# Patient Record
Sex: FEMALE | Race: BLACK OR AFRICAN AMERICAN | Employment: FULL TIME | URBAN - METROPOLITAN AREA
[De-identification: names, ages, dates, MRNs, and addresses within clinical notes are randomized per-mention and may not be internally consistent; named-entity substitution may affect disease eponyms.]

---

## 2017-07-10 LAB
ANTIBODY SCREEN, EXTERNAL: NEGATIVE
CHLAMYDIA, EXTERNAL: NEGATIVE
HBSAG, EXTERNAL: NEGATIVE
HIV, EXTERNAL: NEGATIVE
N. GONORRHEA, EXTERNAL: NEGATIVE
RUBELLA, EXTERNAL: NORMAL
TYPE, ABO & RH, EXTERNAL: NORMAL

## 2017-07-19 ENCOUNTER — HOSPITAL ENCOUNTER (EMERGENCY)
Age: 37
Discharge: HOME | End: 2017-07-19
Attending: EMERGENCY MEDICINE | Admitting: EMERGENCY MEDICINE
Payer: MEDICAID

## 2017-07-19 VITALS
OXYGEN SATURATION: 97 % | WEIGHT: 264.99 LBS | RESPIRATION RATE: 20 BRPM | TEMPERATURE: 98.2 F | HEART RATE: 108 BPM | BODY MASS INDEX: 48.76 KG/M2 | DIASTOLIC BLOOD PRESSURE: 64 MMHG | SYSTOLIC BLOOD PRESSURE: 120 MMHG | HEIGHT: 62 IN
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
## 2017-07-19 DIAGNOSIS — S80.01XA CONTUSION OF RIGHT KNEE, INITIAL ENCOUNTER: ICD-10-CM

## 2017-07-19 PROCEDURE — 99284 EMERGENCY DEPT VISIT MOD MDM: CPT

## 2017-07-19 NOTE — PROGRESS NOTES
Spiritual Care Assessment/Progress Notes    Jesse Dc [id number]  [id number]    1980  40 y.o.  female    Patient Telephone Number: [phone] (home)   Holiness Affiliation: Keith Styles   Language: English   Extended Emergency Contact Information  Primary Emergency Contact: [address] Phone: [phone]  Relation: Parent   There are no active problems to display for this patient. Date: 7/19/2017       Level of Holiness/Spiritual Activity:  []         Involved in tavares tradition/spiritual practice    []         Not involved in tavares tradition/spiritual practice  [x]         Spiritually oriented    []         Claims no spiritual orientation    []         seeking spiritual identity  []         Feels alienated from Hinduism practice/tradition  []         Feels angry about Hinduism practice/tradition  []         Spirituality/Hinduism tradition is a resource for coping at this time.   []         Not able to assess due to medical condition    Services Provided Today:  []         crisis intervention    []         reading Scriptures  []         spiritual assessment    []         prayer  []         empathic listening/emotional support  []         rites and rituals (cite in comments)  []         life review     [x]         Hinduism support  []         theological development   []         advocacy  []         ethical dialog     []         blessing  []         bereavement support    []         support to family  []         anticipatory grief support   []         help with AMD  []         spiritual guidance    []         meditation      Spiritual Care Needs  []         Emotional Support  []         Spiritual/Holiness Care  []         Loss/Adjustment  []         Advocacy/Referral                /Ethics  [x]         No needs expressed at               this time  []         Other: (note in               comments)  [address]  []         Follow up visits with pt/family  []         Provide materials  []         Schedule sacraments  []         Contact Community               Clergy  []         Follow up as needed  []         Other: (note in               comments)     Comments: Briefly met with patient in ER. I mistakenly entered this patient's room as I was looking for another patient. Norman Blair shared with me her concerns over her current pregnancy and some of her hopes and dreams. She was preparing for discharge and thankful everything is okay. Assured her of pastoral prayers for her journey. [id number]. Visit by: Anamaria Miranda. Ricky Han.  Karson Shaw MA, [facility]    Lead  [employer] Development [employer] Advancement

## 2017-07-19 NOTE — ED NOTES
Pt is 15 or 16 weeks pregnant. She did not watch her footing and fell forward in the [employer] parking lot just pta. She caught herself with her hands. This is her first pregnancy, and she has not yet felt the baby move.

## 2017-07-19 NOTE — ED PROVIDER NOTES
HPI Comments: Barbara Lara is a 40 y.o. female who presents ambulatory to [address] [facility] with CC of B/L knee pain after GLF PTA today. Pt reports she fell after a misstep while walking off of a curb leaving a fast food restaurant, landing on her hands and knees; she denies hitting her head, and denies LOC. Pt states she has been ambulatory since fall. She states she is currently 15-16 weeks pregnant. She specifically denies any vaginal bleeding, vaginal discharge, ABD pain, fevers, chills, nausea, vomiting, chest pain, shortness of breath, headache, rash, diarrhea, sweating or weight loss. PCP: None    There are no other complaints, changes, or physical findings at this time. The history is provided by the patient. Past Medical History:   Diagnosis Date    Other ill-defined conditions     Intracranial HTN, Inflammation to Eye       History reviewed. No pertinent surgical history. History reviewed. No pertinent family history. Social History     Social History    Marital status: SINGLE     Spouse name: N/A    Number of children: N/A    Years of education: N/A     Occupational History    Not on file. Social History Main Topics    Smoking status: Current Every Day Smoker     Packs/day: 0.25    Smokeless tobacco: Never Used    Alcohol use Yes      Comment: socially    Drug use: No    Sexual activity: No     Other Topics Concern    Not on file     Social History Narrative         ALLERGIES: Review of patient's allergies indicates no known allergies. Review of Systems   Constitutional: Negative. Negative for activity change, appetite change, chills, fatigue, fever and unexpected weight change. HENT: Negative. Negative for congestion, hearing loss, rhinorrhea, sneezing and voice change. Eyes: Negative. Negative for pain and visual disturbance. Respiratory: Negative. Negative for apnea, cough, choking, chest tightness and shortness of breath. Cardiovascular: Negative.   Negative for chest pain and palpitations. Gastrointestinal: Negative. Negative for abdominal distention, abdominal pain, blood in stool, diarrhea, nausea and vomiting. Genitourinary: Negative. Negative for difficulty urinating, flank pain, frequency, urgency, vaginal bleeding and vaginal discharge. No discharge   Musculoskeletal: Positive for arthralgias (B/L knees). Negative for back pain, myalgias and neck stiffness. Skin: Negative. Negative for color change and rash. Neurological: Negative. Negative for dizziness, seizures, syncope, speech difficulty, weakness, numbness and headaches. Hematological: Negative for adenopathy. Psychiatric/Behavioral: Negative. Negative for agitation, behavioral problems, dysphoric mood and suicidal ideas. The patient is not nervous/anxious. Patient Vitals for the past 12 hrs:   Temp Pulse Resp BP SpO2   07/19/17 1315 98.2 °F (36.8 °C) (!) 108 20 120/64 97 %            Physical Exam   Constitutional: She is oriented to person, place, and time. She appears well-developed and well-nourished. No distress. HENT:   Head: Normocephalic and atraumatic. Mouth/Throat: Oropharynx is clear and moist. No oropharyngeal exudate. Eyes: Conjunctivae and EOM are normal. Pupils are equal, round, and reactive to light. Right eye exhibits no discharge. Left eye exhibits no discharge. Neck: Normal range of motion. Neck supple. No spinous process tenderness and no muscular tenderness present. Cardiovascular: Normal rate, regular rhythm and intact distal pulses. Exam reveals no gallop and no friction rub. No murmur heard. Pulses:       Dorsalis pedis pulses are 2+ on the right side, and 2+ on the left side. Posterior tibial pulses are 2+ on the right side, and 2+ on the left side. CR <2 seconds   Pulmonary/Chest: Effort normal and breath sounds normal. No respiratory distress. She has no wheezes. She has no rales. She exhibits no tenderness. Abdominal: Soft. Bowel sounds are normal. She exhibits no distension and no mass. There is no tenderness. There is no rebound and no guarding. Musculoskeletal: Normal range of motion. She exhibits no edema.   - Lachman's sign B/L; full ROM all extremities   Lymphadenopathy:     She has no cervical adenopathy. Neurological: She is alert and oriented to person, place, and time. No cranial nerve deficit. Coordination normal.   Skin: Skin is warm and dry. No rash noted. No erythema. Psychiatric: She has a normal mood and affect. Nursing note and vitals reviewed. MDM  Number of Diagnoses or Management Options  Diagnosis management comments: DDx: contusion, abrasion, fetal injury    ED Course       Procedures    Chief Complaint   Patient presents with    Fall     patient states that she tripped and fell onto asphault and states that she is currently is 15 weeks pregnant. denies any abdominal cramping or vaginal bleeding       1:14 PM  The patients presenting problems have been discussed, and they are in agreement with the care plan formulated and outlined with them. I have encouraged them to ask questions as they arise throughout their visit. VITAL SIGNS:  Patient Vitals for the past 12 hrs:   Temp Pulse Resp BP SpO2   07/19/17 1315 98.2 °F (36.8 °C) (!) 108 20 120/64 97 %       PROCEDURES:    Procedure Note - Screening Ultrasound:  2:06 PM  Performed by: Karis Mccullough. Vania Lopez, [address] [place] [address] performed at [facility]; fetal heart tones present, heart rate 148. The procedure took 1-15 minutes, and pt tolerated well. PROGRESS NOTES:    2:01 PM  Unable to obtain fetal heart tones; will assess with US. DIAGNOSIS:    1. Fall, initial encounter    2. Contusion of right knee, initial encounter        PLAN:  Follow-up Information     Follow up With Details Comments Contact Info    your obgyn Call in 2 days As needed, If symptoms worsen         ED COURSE: The patients hospital course has been uncomplicated.      2:11 PM  Magaly Eulogio's  results have been reviewed with her. She has been counseled regarding her diagnosis. She verbally conveys understanding and agreement of the signs, symptoms, diagnosis, treatment and prognosis and additionally agrees to follow up as recommended with Dr. None in 24 - 48 hours. She also agrees with the care-plan and conveys that all of her questions have been answered. I have also put together some discharge instructions for her that include: 1) educational information regarding their diagnosis, 2) how to care for their diagnosis at home, as well a 3) list of reasons why they would want to return to the ED prior to their follow-up appointment, should their condition change. This note is prepared by Jacoby Judge, acting as Scribe for [employer] Blayne Middleton, [address] Blayne Middleton MD: The scribe's documentation has been prepared under my direction and personally reviewed by me in its entirety. I confirm that the note above accurately reflects all work, treatment, procedures, and medical decision making performed by me.

## 2017-07-19 NOTE — DISCHARGE INSTRUCTIONS
Bruises: Care Instructions  Your Care Instructions    Bruises occur when small blood vessels under the skin tear or rupture, most often from a twist, bump, or fall. Blood leaks into tissues under the skin and causes a black-and-blue spot that often turns colors, including purplish black, reddish blue, or yellowish green, as the bruise heals. Bruises hurt, but most are not serious and will go away on their own within 2 to 4 weeks. Sometimes, gravity causes them to spread down the body. A leg bruise usually will take longer to heal than a bruise on the face or arms. Follow-up care is a key part of your treatment and safety. Be sure to make and go to all appointments, and call your doctor if you are having problems. Its also a good idea to know your test results and keep a list of the medicines you take. How can you care for yourself at home? · Take pain medicines exactly as directed. ¨ If the doctor gave you a prescription medicine for pain, take it as prescribed. ¨ If you are not taking a prescription pain medicine, ask your doctor if you can take an over-the-counter medicine. · Put ice or a cold pack on the area for 10 to 20 minutes at a time. Put a thin cloth between the ice and your skin. · If you can, prop up the bruised area on pillows as much as possible for the next few days. Try to keep the bruise above the level of your heart. When should you call for help? Call your doctor now or seek immediate medical care if:  · You have signs of infection, such as:  ¨ Increased pain, swelling, warmth, or redness. ¨ Red streaks leading from the bruise. ¨ Pus draining from the bruise. ¨ A fever. · You have a bruise on your leg and signs of a blood clot, such as:  ¨ Increasing redness and swelling along with warmth, tenderness, and pain in the bruised area. ¨ Pain in your calf, back of the knee, thigh, or groin. ¨ Redness and swelling in your leg or groin. · Your pain gets worse.   Watch closely for changes in your health, and be sure to contact your doctor if:  · You do not get better as expected. Where can you learn more? Go to http://lin-blair.info/. Enter [phone] [phone] in the search box to learn more about \"Bruises: Care Instructions. \"  Current as of: March 20, 2017  Content Version: 11.3  © 4392-2633 [employer]. Care instructions adapted under license by [employer] (which disclaims liability or warranty for this information). If you have questions about a medical condition or this instruction, always ask your healthcare professional. [place] [address] any warranty or liability for your use of this information. Contusion: Care Instructions  Your Care Instructions  Contusion is the medical term for a bruise. It is the result of a direct blow or an impact, such as a fall. Contusions are common sports injuries. Most people think of a bruise as a black-and-blue spot. This happens when small blood vessels get torn and leak blood under the skin. But bones, muscles, and organs can also get bruised. This may damage deep tissues but not cause a bruise you can see. The doctor will do a physical exam to find the location of your contusion. You may also have tests to make sure you do not have a more serious injury, such as a broken bone or nerve damage. These may include X-rays or other imaging tests like a CT scan or MRI. Deep-tissue contusions may cause pain and swelling. But if there is no serious damage, they will often get better in a few weeks with home treatment. The doctor has checked you carefully, but problems can develop later. If you notice any problems or new symptoms, get medical treatment right away. Follow-up care is a key part of your treatment and safety. Be sure to make and go to all appointments, and call your doctor if you are having problems.  It's also a good idea to know your test results and keep a list of the medicines you take.  How can you care for yourself at home? · Put ice or a cold pack on the sore area for 10 to 20 minutes at a time to stop swelling. Put a thin cloth between the ice pack and your skin. · Be safe with medicines. Read and follow all instructions on the label. ¨ If the doctor gave you a prescription medicine for pain, take it as prescribed. ¨ If you are not taking a prescription pain medicine, ask your doctor if you can take an over-the-counter medicine. · If you can, prop up the sore area on pillows as much as possible for the next few days. Try to keep the sore area above the level of your heart. When should you call for help? Call your doctor now or seek immediate medical care if:  · Your pain gets worse. · You have new or worse swelling. · You have tingling, weakness, or numbness in the area near the contusion. · The area near the contusion is cold or pale. Watch closely for changes in your health, and be sure to contact your doctor if:  · You do not get better as expected. Where can you learn more? Go to Med-Tek.be  Enter [id number] in the search box to learn more about \"Contusion: Care Instructions. \"   © 8432-7774 [employer]. Care instructions adapted under license by [facility] (which disclaims liability or warranty for this information). This care instruction is for use with your licensed healthcare professional. If you have questions about a medical condition or this instruction, always ask your healthcare professional. [place] [address] any warranty or liability for your use of this information.   Content Version: 37.5.476945; Current as of: May 22, 2015

## 2017-07-25 ENCOUNTER — HOSPITAL ENCOUNTER (OUTPATIENT)
Dept: PERINATAL CARE | Age: 37
Discharge: HOME | End: 2017-07-25
Attending: OBSTETRICS & GYNECOLOGY
Payer: MEDICAID

## 2017-07-25 PROCEDURE — 76811 OB US DETAILED SNGL FETUS: CPT | Performed by: OBSTETRICS & GYNECOLOGY

## 2017-09-05 ENCOUNTER — HOSPITAL ENCOUNTER (OUTPATIENT)
Dept: PERINATAL CARE | Age: 37
Discharge: HOME | End: 2017-09-05
Attending: OBSTETRICS & GYNECOLOGY
Payer: MEDICAID

## 2017-09-05 PROCEDURE — 76816 OB US FOLLOW-UP PER FETUS: CPT | Performed by: OBSTETRICS & GYNECOLOGY

## 2017-10-17 ENCOUNTER — HOSPITAL ENCOUNTER (OUTPATIENT)
Dept: PERINATAL CARE | Age: 37
Discharge: HOME | End: 2017-10-17
Payer: MEDICAID

## 2017-10-17 PROCEDURE — 76816 OB US FOLLOW-UP PER FETUS: CPT | Performed by: OBSTETRICS & GYNECOLOGY

## 2017-10-25 ENCOUNTER — HOSPITAL ENCOUNTER (INPATIENT)
Age: 37
LOS: 5 days | Discharge: HOME | DRG: 560 | End: 2017-10-30
Attending: OBSTETRICS & GYNECOLOGY | Admitting: OBSTETRICS & GYNECOLOGY
Payer: MEDICAID

## 2017-10-25 LAB
ERYTHROCYTE [DISTWIDTH] IN BLOOD BY AUTOMATED COUNT: 13.3 % (ref 11.5–14.5)
HCT VFR BLD AUTO: 30.9 % (ref 35–47)
HGB BLD-MCNC: 10.2 G/DL (ref 11.5–16)
MCH RBC QN AUTO: 32.5 PG (ref 26–34)
MCHC RBC AUTO-ENTMCNC: 33 G/DL (ref 30–36.5)
MCV RBC AUTO: 98.4 FL (ref 80–99)
PLATELET # BLD AUTO: 258 K/UL (ref 150–400)
RBC # BLD AUTO: 3.14 M/UL (ref 3.8–5.2)
WBC # BLD AUTO: 12.1 K/UL (ref 3.6–11)

## 2017-10-25 PROCEDURE — 3E0P3VZ INTRODUCTION OF HORMONE INTO FEMALE REPRODUCTIVE, PERCUTANEOUS APPROACH: ICD-10-PCS | Performed by: OBSTETRICS & GYNECOLOGY

## 2017-10-25 PROCEDURE — 74011250636 HC RX REV CODE- 250/636: Performed by: OBSTETRICS & GYNECOLOGY

## 2017-10-25 PROCEDURE — 74011250637 HC RX REV CODE- 250/637: Performed by: OBSTETRICS & GYNECOLOGY

## 2017-10-25 PROCEDURE — 85027 COMPLETE CBC AUTOMATED: CPT | Performed by: OBSTETRICS & GYNECOLOGY

## 2017-10-25 PROCEDURE — 86900 BLOOD TYPING SEROLOGIC ABO: CPT | Performed by: OBSTETRICS & GYNECOLOGY

## 2017-10-25 PROCEDURE — 75410000002 HC LABOR FEE PER 1 HR

## 2017-10-25 PROCEDURE — 74011250636 HC RX REV CODE- 250/636

## 2017-10-25 PROCEDURE — 74011000258 HC RX REV CODE- 258

## 2017-10-25 PROCEDURE — 65270000029 HC RM PRIVATE

## 2017-10-25 PROCEDURE — 36415 COLL VENOUS BLD VENIPUNCTURE: CPT | Performed by: OBSTETRICS & GYNECOLOGY

## 2017-10-25 PROCEDURE — 3E0P7VZ INTRODUCTION OF HORMONE INTO FEMALE REPRODUCTIVE, VIA NATURAL OR ARTIFICIAL OPENING: ICD-10-PCS | Performed by: OBSTETRICS & GYNECOLOGY

## 2017-10-25 RX ORDER — ACETAMINOPHEN 325 MG/1
650 TABLET ORAL
Status: DISCONTINUED | OUTPATIENT
Start: 2017-10-25 | End: 2017-10-30 | Stop reason: HOSPADM

## 2017-10-25 RX ORDER — NALOXONE HYDROCHLORIDE 0.4 MG/ML
0.4 INJECTION, SOLUTION INTRAMUSCULAR; INTRAVENOUS; SUBCUTANEOUS AS NEEDED
Status: DISCONTINUED | OUTPATIENT
Start: 2017-10-25 | End: 2017-10-28 | Stop reason: SDUPTHER

## 2017-10-25 RX ORDER — SODIUM CHLORIDE, SODIUM LACTATE, POTASSIUM CHLORIDE, CALCIUM CHLORIDE 600; 310; 30; 20 MG/100ML; MG/100ML; MG/100ML; MG/100ML
125 INJECTION, SOLUTION INTRAVENOUS CONTINUOUS
Status: DISCONTINUED | OUTPATIENT
Start: 2017-10-25 | End: 2017-10-30 | Stop reason: HOSPADM

## 2017-10-25 RX ORDER — SODIUM CHLORIDE 0.9 % (FLUSH) 0.9 %
5-10 SYRINGE (ML) INJECTION AS NEEDED
Status: DISCONTINUED | OUTPATIENT
Start: 2017-10-25 | End: 2017-10-30 | Stop reason: HOSPADM

## 2017-10-25 RX ORDER — SODIUM CHLORIDE 0.9 % (FLUSH) 0.9 %
5-10 SYRINGE (ML) INJECTION EVERY 8 HOURS
Status: DISCONTINUED | OUTPATIENT
Start: 2017-10-25 | End: 2017-10-30 | Stop reason: HOSPADM

## 2017-10-25 RX ORDER — CEFAZOLIN SODIUM 1 G/3ML
INJECTION, POWDER, FOR SOLUTION INTRAMUSCULAR; INTRAVENOUS
Status: COMPLETED
Start: 2017-10-25 | End: 2017-10-25

## 2017-10-25 RX ORDER — SODIUM CHLORIDE 900 MG/100ML
INJECTION INTRAVENOUS
Status: COMPLETED
Start: 2017-10-25 | End: 2017-10-25

## 2017-10-25 RX ADMIN — CEFAZOLIN SODIUM 1 G: 1 INJECTION, POWDER, FOR SOLUTION INTRAMUSCULAR; INTRAVENOUS at 21:47

## 2017-10-25 RX ADMIN — CEFAZOLIN 3 G: 1 INJECTION, POWDER, FOR SOLUTION INTRAMUSCULAR; INTRAVENOUS; PARENTERAL at 21:53

## 2017-10-25 RX ADMIN — SODIUM CHLORIDE 50 ML: 900 INJECTION, SOLUTION INTRAVENOUS at 21:48

## 2017-10-25 RX ADMIN — SODIUM CHLORIDE, SODIUM LACTATE, POTASSIUM CHLORIDE, AND CALCIUM CHLORIDE 125 ML/HR: 600; 310; 30; 20 INJECTION, SOLUTION INTRAVENOUS at 21:53

## 2017-10-25 RX ADMIN — MISOPROSTOL 100 MCG: 100 TABLET ORAL at 18:52

## 2017-10-25 RX ADMIN — ACETAMINOPHEN 650 MG: 325 TABLET, FILM COATED ORAL at 22:42

## 2017-10-25 NOTE — H&P
History & Physical    Name: Nicolle Joy MRN: [id number]  SSN: [id number]    YOB: 1980  Age: 40 y.o. Sex: female        Subjective:     Estimated Date of Delivery: 17  OB History    Para Term  AB Living   1        SAB TAB Ectopic Molar Multiple Live Births              # Outcome Date GA Lbr Bruno/2nd Weight Sex Delivery Anes PTL Lv   1 Current                   Ms. Ashlee Pearson is admitted with pregnancy at 31w0d for induction of labor. Prenatal course significant for fetus with anencephaly. Pt presented today to office with fetal demise. Please see prenatal records for details. Past Medical History:   Diagnosis Date    Other ill-defined conditions(388.00)     Intracranial HTN, Inflammation to Eye     No past surgical history on file. Social History     Occupational History    Not on file. Social History Main Topics    Smoking status: Current Every Day Smoker     Packs/day: 0.25    Smokeless tobacco: Never Used    Alcohol use Yes      Comment: socially    Drug use: No    Sexual activity: No     No family history on file. No Known Allergies  Prior to Admission medications    Medication Sig Start Date End Date Taking? Authorizing Provider   pnv w/o calcium-iron fum-fa 27-1 mg tab Take  by mouth. Phys Other, MD        Review of Systems: Pertinent items are noted in HPI.     Objective:     Vitals:  Vitals:    10/25/17 1743   Weight: 121.1 kg (267 lb)   Height: 5' 2\" (1.575 m)        Physical Exam:  Heart: Regular rate and rhythm  Lung: clear to auscultation throughout lung fields, no wheezes, no rales, no rhonchi and normal respiratory effort  Abdomen: soft, nontender  Cervical Exam: 0 cm dilated    50% effaced    -3 station    Presenting Part: cephalic  Membranes:  Intact  Fetal Heart Rate: none    Prenatal Labs:   No results found for: ABORH, RUBELLAEXT, GRBSEXT, HBSAGEXT, HIVEXT, RPREXT, GONNOEXT, CHLAMEXT, ABORHEXT, RUBELLAEXT, GRBSEXT, HBSAGEXT, HIVEXT, RPREXT, ROWENA, CHLAMEXT      Assessment/Plan:     Active Problems:    * No active hospital problems. *       Plan: Admit for fetal demise/anencephaly at 31 weeks. Group B Strep was not tested.     Signed By:  Jv Anderson MD     October 25, 2017

## 2017-10-25 NOTE — IP AVS SNAPSHOT
[address] 
[phone] Patient: Hien Lowery MRN: [id number] SCJ:[id number] My Medications STOP taking these medications   
 pnv w/o calcium-iron fum-fa 27-1 mg Tab TAKE these medications as instructed Instructions Each Dose to Equal  
 Morning Noon Evening Bedtime  
 ibuprofen 800 mg tablet Commonly known as:  MOTRIN Your last dose was: Your next dose is: Take 1 Tab by mouth every eight (8) hours as needed for Pain. 800 mg  
    
   
   
   
  
 oxyCODONE-acetaminophen 5-325 mg per tablet Commonly known as:  PERCOCET Your last dose was: Your next dose is: Take 1 Tab by mouth every four (4) hours as needed. Max Daily Amount: 6 Tabs. 1 Tab  
    
   
   
   
  
 sertraline 50 mg tablet Commonly known as:  ZOLOFT Your last dose was: Your next dose is: Take 1 Tab by mouth daily. 50 mg Where to Get Your Medications Information on where to get these meds will be given to you by the nurse or doctor. ! Ask your nurse or doctor about these medications  
  ibuprofen 800 mg tablet  
 oxyCODONE-acetaminophen 5-325 mg per tablet  
 sertraline 50 mg tablet

## 2017-10-25 NOTE — PROGRESS NOTES
1720-Pt arrived from home for scheduled induction due to fetal demise, baby has anencephaly, Began to discuss plan of care and situation in detail  1727-Pt asking for shower, gave linens and pt showering  1739-spoke with Dr Rosemary Pardo, orders rec'd  1750-Billett in to see pt  1915-patient eating  1935-bedside report to JOELLE Llanes RN and Jimmy Branch RN

## 2017-10-25 NOTE — IP AVS SNAPSHOT
2700 [facility] [address] [place] [address] 
[phone] Patient: Hien Lowery MRN: [id number] CVI:[id number] About your hospitalization You were admitted on:  October 25, 2017 You last received care in the:  [facility] [address] You were discharged on:  October 30, 2017 Why you were hospitalized Your primary diagnosis was:  Iufd At 21 Weeks Or More Of Gestation Your diagnoses also included:  Fetal Anencephaly Affecting Pregnancy, Intracranial Hypertension, Benign, Intracranial Shunt Things You Need To Do (next 8 weeks) Follow up with Ulises Ladd MD  
  
Phone:  [phone] Where:  217 [facility], [address] [facility], [address] [place] [address] Discharge Orders None A check brain indicates which time of day the medication should be taken. My Medications STOP taking these medications   
 pnv w/o calcium-iron fum-fa 27-1 mg Tab TAKE these medications as instructed Instructions Each Dose to Equal  
 Morning Noon Evening Bedtime  
 ibuprofen 800 mg tablet Commonly known as:  MOTRIN Your last dose was: Your next dose is: Take 1 Tab by mouth every eight (8) hours as needed for Pain. 800 mg  
    
   
   
   
  
 oxyCODONE-acetaminophen 5-325 mg per tablet Commonly known as:  PERCOCET Your last dose was: Your next dose is: Take 1 Tab by mouth every four (4) hours as needed. Max Daily Amount: 6 Tabs. 1 Tab  
    
   
   
   
  
 sertraline 50 mg tablet Commonly known as:  ZOLOFT Your last dose was: Your next dose is: Take 1 Tab by mouth daily. 50 mg Where to Get Your Medications Information on where to get these meds will be given to you by the nurse or doctor. ! Ask your nurse or doctor about these medications  
  ibuprofen 800 mg tablet oxyCODONE-acetaminophen 5-325 mg per tablet  
 sertraline 50 mg tablet Discharge Instructions Discharge Instructions for Vaginal Delivery Patient ID: Angela Garcia 
[id number] 
40 y.o. 
1980 Take Home Medications See medication list 
 
Follow-up Appointment: 
Follow-up with Dr. Laura Love in 1 week. Follow-up care is a key part of your treatment and safety. Be sure to make and go to all appointments, and call your doctor if you are having problems. Its also a good idea to know your test results and keep a list of the medicines you take. Activity Avoid anything in your vagina for 6 weeks (no intercourse, tampons, or douching). You may drive unless you are taking prescription pain medications. Climbing stairs and light lifting are ok after a vaginal delivery unless your doctor tells you not to. You may gradually work up to exercise over the next few weeks. Diet You may eat a regular diet but you may want to avoid heavy, greasy foods and other foods that could increase constipation. Wound care If you have stitches, continue to rinse with a squirt bottle of warm water each time you use the bathroom for about 2 weeks. Your stitches will gradually dissolve over several weeks. Sitz baths are also helpful to keep the wound clean, encourage healing, and to help with pain associated with the stitches or hemorrhoids. You can use either a sitz bath basin or a bathtub filled with 2-3\" inches of plain warm water. Soak for 10 minutes 3 times a day. Pain Management If you were not given a prescription pain medication, you can take over the counter pain medicines like Tylenol (acetominophen), Advil or Motrin (ibuprofen). You can take acetominophen and ibuprofen together, alternating doses every few hours. You will get the most relief if you take the maximum dose: · Tylenol or acetominophen 1000 mg every 6 hours (equivalent to 2 Extra Strength Tylenols every 6 hours) · Motrin or Advil (generic ibuprofen) 800 mg every 8 hours (4 tablets or capsules every 8 hours) If you were given a prescription pain medication, you can take ibuprofen along with it (doses as above), but not Tylenol. Use ibuprofen as the main medication, and take the prescription medication if needed for more severe pain not relieved by the ibuprofen. Your goal should be to take only the minimum necessary amounts of the prescription medication (narcotic), as these pain medicines can be habit-forming and will worsen or cause constipation. Most patients will find that within a couple of days, their pain is adequately controlled using only over-the-counter medications. A heating pad can also be very helpful for cramping or back pain. Over-the-counter hemorrhoid wipes and ointments are fine to use if you have hemorrhoids. Constipation You may find that bowel movements are irregular after delivery and that you have a tendency to be constipated. If you have stitches (and especially if you had a more severe tear called a third- or fourth-degree), your bowel movements will be more comfortable if they are soft. A stool softener such as Colace (docusate) can safely be taken daily if needed. If you become constipated you can use a laxative such as Dulcolax, Miralax or Milk of Magnesia. Don't wait until constipation is severe- take something sooner rather than later and you will feel much better! Your Recovery: What to Expect at [facility] If you're experiencing soreness or swelling in your bottom, this should improve over the next few days to 2 weeks. You are likely to have some back pain or general body aches or muscle soreness. This should improve with acetominophen or ibuprofen. If your legs were swollen during your pregnancy or as a result of IV fluids given during your hospital stay, this should go away in a few days to a week. When should you call for help? Call 911 anytime you think you may need emergency care. For example, call if: You pass out (lose consciousness). You have sudden chest pain and shortness of breath, or you cough up blood. You have severe pain in your belly. Call your doctor now or seek immediate medical care if: 
You have heavy vaginal bleeding that soaks one or more pads in an hour, or you have large clots (golf ball size or larger). Your have foul-smelling discharge from your vagina. You are sick to your stomach or cannot keep fluids down. You have pain that does not get better after you take pain medicine. You have signs of infection, such as: Increased pain in your abdomen or vaginal area Red streaks, warmth, or tenderness of your breasts. A fever of 101 or greater (taken by mouth). You have signs of a blood clot, such as: 
Pain in your calf, back of knee, thigh, or groin. Redness and swelling in your leg or groin. You have trouble passing urine or stool, especially if you have pain or swelling in your lower belly; or if you are unable to have a bowel movement after taking a laxative. You have a fast or pounding heartbeat. [employer] Announcement We are excited to announce that we are making your provider's discharge notes available to you in [employer]. You will see these notes when they are completed and signed by the physician that discharged you from your recent hospital stay. If you have any questions or concerns about any information you see in [employer], please call the Health Information Department where you were seen or reach out to your Primary Care Provider for more information about your plan of care. Introducing [facility]! Marjorie Zeng introduces [employer] patient portal. Now you can access parts of your medical record, email your doctor's office, and request medication refills online. 1. In your internet browser, go to [email or web address] 2. Click on the First Time User? Click Here link in the Sign In box. You will see the New Member Sign Up page. 3. Enter your [employer] Access Code exactly as it appears below. You will not need to use this code after youve completed the sign-up process. If you do not sign up before the expiration date, you must request a new code. · [employer] Access Code: [email or web address] Expires: 1/15/2018  2:32 PM 
 
4. Enter the last four digits of your Social Security Number (xxxx) and Date of Birth (mm/dd/yyyy) as indicated and click Submit. You will be taken to the next sign-up page. 5. Create a [employer] ID. This will be your [employer] login ID and cannot be changed, so think of one that is secure and easy to remember. 6. Create a [employer] password. You can change your password at any time. 7. Enter your Password Reset Question and Answer. This can be used at a later time if you forget your password. 8. Enter your e-mail address. You will receive e-mail notification when new information is available in [address] 9. Click Sign Up. You can now view and download portions of your medical record. 10. Click the Download Summary menu link to download a portable copy of your medical information. If you have questions, please visit the Frequently Asked Questions section of the [employer] website. Remember, [employer] is NOT to be used for urgent needs. For medical emergencies, dial 911. Now available from your iPhone and Android! Unresulted Labs-Please follow up with your PCP about these lab tests Order Current Status SAMPLE TO BLOOD BANK In process Providers Seen During Your Hospitalization Provider Specialty Primary office phone Berta Mehta MD Obstetrics & Gynecology [phone] Your Primary Care Physician (PCP) Primary Care Physician Office Phone Office Fax Caroline Coats [phone] You are allergic to the following No active allergies Recent Documentation Height Weight BMI OB Status Smoking Status 1.575 m 121.1 kg 48.83 kg/m2 Pregnant Current Every Day Smoker Emergency Contacts Name Discharge Info Relation Home Work Mobile Larissa Krishnan [1] [phone] Patient Belongings The following personal items are in your possession at time of discharge: 
  Dental Appliances: None  Visual Aid: Glasses      Home Medications: None   Jewelry: None  Clothing: At bedside    Other Valuables: Cell Phone Discharge Instructions Attachments/References POST PARTUM/ LOSS DISCHARGE INSTRUCTIONS Patient Handouts POST PARTUM/ LOSS DISCHARGE INSTRUCTION Post partum process includes: Afterbirth Pains:  
Abdominal pains often feel like contractions or strong menstrual cramps. Pain should decrease after three days but can last up to two weeks. Deep breathing and/or use of a heating pad may relieve these symptoms. You may also use over the counter or prescribed pain medication as directed by your CNM/physician. Vaginal Discharge:  
Vaginal discharge may be heavy red with clots. The flow should gradually decrease and change to pink, then brown, followed by a yellowish discharge. This may continue for two to six weeks. Use only pads, no tampons, until seen by your physician post discharge. Breast care:  
1)  Breasts may fill with milk around the third day after delivery. This can be a painful  
reminder of your loss. This can also be physically uncomfortable. 2)  Wear a well supporting bra, breast binder, or ace bandage around the breasts 24                      hours a day until breasts return to normal. 
3)  Application of ice packs two times a day under each arm may relieve engorgement. 4)  Do not express, pump, or use heat to relieve engorgement as this will only increase                      milk production. 5)  Use care in the shower as this may stimulate milk let down. Emotional Care: 
Be aware of the physical and psychological symptoms following loss. Information for you is located in the grief packet. Stages of Grief: 
1)  Denial 
2)  Anger 3)  Bargaining 4)  Depression 5)  Acceptance Call your doctor for the following: 
Fever over 101 degrees by mouth Vaginal bleeding heavier than a normal menstrual period or clots larger than golf ball size Red streaks in or increased swelling of legs Red streaks or lumps in breasts, or severe breast pain 
Painful urination, constipation Increased pain, swelling, drainage, or foul odor from your episiotomy, laceration, or incision If you feel extremely anxious or overwhelmed If you have thoughts of harming yourself or others Please provide this summary of care documentation to your next provider. Signatures-by signing, you are acknowledging that this After Visit Summary has been reviewed with you and you have received a copy. Patient Signature:  ____________________________________________________________ Date:  ____________________________________________________________  
  
Morenita Lazaro Provider Signature:  ____________________________________________________________ Date:  ____________________________________________________________

## 2017-10-26 PROBLEM — Z98.2 INTRACRANIAL SHUNT: Status: ACTIVE | Noted: 2017-10-26

## 2017-10-26 PROBLEM — G93.2 INTRACRANIAL HYPERTENSION, BENIGN: Status: ACTIVE | Noted: 2017-10-26

## 2017-10-26 PROBLEM — O35.02X0: Status: ACTIVE | Noted: 2017-10-26

## 2017-10-26 PROBLEM — O36.4XX0 IUFD AT 20 WEEKS OR MORE OF GESTATION: Status: ACTIVE | Noted: 2017-10-26

## 2017-10-26 PROCEDURE — 74011250636 HC RX REV CODE- 250/636: Performed by: OBSTETRICS & GYNECOLOGY

## 2017-10-26 PROCEDURE — 74011250637 HC RX REV CODE- 250/637: Performed by: OBSTETRICS & GYNECOLOGY

## 2017-10-26 PROCEDURE — 65270000029 HC RM PRIVATE

## 2017-10-26 PROCEDURE — 75410000002 HC LABOR FEE PER 1 HR

## 2017-10-26 RX ORDER — ONDANSETRON 2 MG/ML
4 INJECTION INTRAMUSCULAR; INTRAVENOUS
Status: DISCONTINUED | OUTPATIENT
Start: 2017-10-26 | End: 2017-10-30 | Stop reason: HOSPADM

## 2017-10-26 RX ORDER — ONDANSETRON 2 MG/ML
INJECTION INTRAMUSCULAR; INTRAVENOUS
Status: DISPENSED
Start: 2017-10-26 | End: 2017-10-27

## 2017-10-26 RX ORDER — OXYTOCIN IN 5 % DEXTROSE 30/500 ML
1-25 PLASTIC BAG, INJECTION (ML) INTRAVENOUS
Status: DISCONTINUED | OUTPATIENT
Start: 2017-10-26 | End: 2017-10-30 | Stop reason: HOSPADM

## 2017-10-26 RX ADMIN — CEFAZOLIN 3 G: 1 INJECTION, POWDER, FOR SOLUTION INTRAMUSCULAR; INTRAVENOUS; PARENTERAL at 06:02

## 2017-10-26 RX ADMIN — Medication 2 MILLI-UNITS/MIN: at 23:15

## 2017-10-26 RX ADMIN — MISOPROSTOL 50 MCG: 100 TABLET ORAL at 00:09

## 2017-10-26 RX ADMIN — MISOPROSTOL 50 MCG: 100 TABLET ORAL at 06:11

## 2017-10-26 RX ADMIN — CEFAZOLIN 3 G: 1 INJECTION, POWDER, FOR SOLUTION INTRAMUSCULAR; INTRAVENOUS; PARENTERAL at 22:29

## 2017-10-26 RX ADMIN — MISOPROSTOL 100 MCG: 100 TABLET ORAL at 12:44

## 2017-10-26 RX ADMIN — CEFAZOLIN 3 G: 1 INJECTION, POWDER, FOR SOLUTION INTRAMUSCULAR; INTRAVENOUS; PARENTERAL at 13:57

## 2017-10-26 RX ADMIN — ONDANSETRON 4 MG: 2 INJECTION INTRAMUSCULAR; INTRAVENOUS at 18:20

## 2017-10-26 RX ADMIN — MISOPROSTOL 100 MCG: 100 TABLET ORAL at 17:42

## 2017-10-26 NOTE — PROGRESS NOTES
Care of patient assumed after sign-out from Dr. Dorothy Faye at 8 pm -  at 31w0d with IUFD in the context of fetal anencephaly. In review of Epic notes and discussion with patient, she also has a personal of intracranial hypertension and is s/p  shunt placement approximately 7 years ago. Patient is currently without complaints. She received 100 mcg bucally at 7 pm.    Visit Vitals    /71 (BP 1 Location: Left arm, BP Patient Position: At rest)    Pulse 82    Temp 98.1 °F (36.7 °C)    Resp 14    Ht 5' 2\" (1.575 m)    Wt 121.1 kg (267 lb)    LMP 2017    BMI 48.83 kg/m2     Alert, appropriate  Abdomen soft, and non-tender  Cervix not examined    A/P  at 31w0d with IUFD/fetal anencephaly, admitted for labor induction; maternal history of intracranial hypertension s/p  shunt placement. Patient currently does not have headache, visual symptoms, or any other neurologic complaints that would suggest  shunt function is compromised. Current recommendation per Up to Date reviewed as follows:    \"Although there are no controlled studies examining the best method of delivery for pregnant women with  shunts, there seems to be agreement that vaginal delivery can be attempted [84-86]. Shortening the second stage has been recommended by some, but not all authors to reduce the rise in intracranial pressure that occurs during pushing. However, increased intracranial pressure should not occur with a properly functioning valve. Caesarean delivery is recommended for neurologically unstable patients and those with obstetrical indications for surgical delivery. Care should be taken to avoid dislodging or manipulation of the abdominal tip of the shunt while the peritoneal cavity is open. Both regional and general anesthesia can be used in pregnant women with  shunts [87].  Prophylactic antibiotics have usually been recommended for labor and delivery to avoid shunt infection [79,85]; however, the risk of infection is low and this practice has been questioned [86]. The antibiotics given are the same as for prophylaxis against infective endocarditis. \"    Will add prophylactic antibiotic - Ancef 1 gm IV q 8 hours while in labor. Patient was also reviewed with Dr. Sigrid Snyder of anesthesiology; he is aware of patient's medical history and will adjust anesthesia care accordingly as needed.     Diony Chong MD  9:26 PM

## 2017-10-26 NOTE — PROGRESS NOTES
@8410 Spoke to Pharmacy regarding 3g ancef dosage change (from 1gm/8 hours ordered by MD). Candice, pharmacist, states it was done, per policy, due to patient's weight. Updated Dr. Tiara Estrella on this order change.

## 2017-10-26 NOTE — PROGRESS NOTES
Spiritual Care Assessment/Progress Notes    Miki Cos [id number]  [id number]    1980  40 y.o.  female    Patient Telephone Number: [phone] (home)   Sikh Affiliation: [place]   Language: English   Extended Emergency Contact Information  Primary Emergency Contact: [address] Phone: [phone]  Relation: Parent   There are no active problems to display for this patient. Date: 10/26/2017       Level of Sikh/Spiritual Activity:  []         Involved in tavares tradition/spiritual practice    []         Not involved in tavares tradition/spiritual practice  []         Spiritually oriented    []         Claims no spiritual orientation    []         seeking spiritual identity  []         Feels alienated from Latter day practice/tradition  []         Feels angry about Latter day practice/tradition  [x]         Spirituality/Latter day tradition is a resource for coping at this time.   []         Not able to assess due to medical condition    Services Provided Today:  [x]         crisis intervention    []         reading Scriptures  []         spiritual assessment    []         prayer  []         empathic listening/emotional support  []         rites and rituals (cite in comments)  []         life review     []         Latter day support  []         theological development   []         advocacy  []         ethical dialog     []         blessing  []         bereavement support    []         support to family  []         anticipatory grief support   []         help with AMD  []         spiritual guidance    []         meditation      Spiritual Care Needs  []         Emotional Support  []         Spiritual/Sikh Care  []         Loss/Adjustment  []         Advocacy/Referral                /Ethics  []         No needs expressed at               this time  []         Other: (note in               comments)  [address]  []         Follow up visits with pt/family  []         Provide materials  []         Schedule sacraments  []         Contact Community               Clergy  []         Follow up as needed  []         Other: (note in               comments)     Paged by Nurse Nicki Olivares to offer pastoral support to Ms. Felton Doe, who indicated an interest in a  visit. In consulting with Nicki Olivares she indicated that Ms. Felton Doe is expected to deliver soon and that the fetus will not be able to survive outside the womb due to a severe medical condition. She also indicated that the couple Angelita Ryan and Saul) had pressing physical needs (food and shower) that were met by staff upon admission. Entered room to find [address] [place] [address] asleep in a recliner. Ms. Felton Doe was also in bed, but awake. She indicated an interest in speaking, but perhaps at a later time. Assured her of prayer and desire to be of support. Consulted with Nurse Nicki Olivares prior to departing unit. Will revisit later today. 2400 [facility]'s Staff  (Asif 5 Patient Care Specialist)   Paging Service [phone])

## 2017-10-26 NOTE — PROGRESS NOTES
Attempted to visit patient Herberth Slater and partner Pat Arevalo. Knocked and entered to find both sleeping soundly. Consulted with Nurse Mima Angel who shared that a physician is expected to visit before noon today. Will attempt to revisit between 12:00 and 12:30. 2400 [facility]'s Staff  (Asif Roque Patient Care Specialist)   Paging Service [phone])

## 2017-10-26 NOTE — PROGRESS NOTES
1935- Bedside, Verbal and Written shift change report given to Claire Peoples, RN & TOMY Llanes RN (oncoming nurse) by YORDY Valverde, RN (offgoing nurse). Report included the following information SBAR, Kardex, MAR and Accordion. 2100- Dr. Anita Pedroza in room to discuss plan of care. 5- Dr. Anita Pedroza at bedside to place cytotec vaginally. 0611-Dr. Henning at bedside to place cytotec vaginally. E 2/50/-2 0730- Bedside, Verbal and Written shift change report given to YORDY [facility], [address] Jeanne Palacios, SAMI (oncoming nurse) by Claire Peoples RN (offgoing nurse). Report included the following information SBAR, Kardex, Intake/Output, MAR and Accordion.

## 2017-10-26 NOTE — PROGRESS NOTES
Patient seen and examined. She has slept for most of the night but did have a brief episode of pain at around 0300. It resolved and she fell back asleep. No bleeding or leaking of fluid noted. She strongly desires breakfast.    Visit Vitals    /71 (BP 1 Location: Left arm, BP Patient Position: At rest)    Pulse 82    Temp 98.1 °F (36.7 °C)    Resp 14    Ht 5' 2\" (1.575 m)    Wt 121.1 kg (267 lb)    LMP 2017    BMI 48.83 kg/m2     Alert, appropriate  Abdomen soft and non-tender  Cervix 2/50/-2, anterior cervical position    50 mcg misoprostol again placed vaginally    A/P   at 31w1d undergoing [place] due to IUFD (anencephaly) at 34 weeks. Maternal history of intracranial hypertension with  shunt; on Ancef for maternal infection prophylaxis; anesthesiologist aware. Will continue ripening with misoprostol; may need pitocin later today depending on response to misoprostol. Light breakfast ordered.      Henrique Bob MD  6:29 AM

## 2017-10-26 NOTE — PROGRESS NOTES
Spiritual Care Assessment/Progress Notes    Hien Lowery [id number]  [id number]    1980  40 y.o.  female    Patient Telephone Number: [phone] (home)   Buddhism Affiliation: [place]   Language: English   Extended Emergency Contact Information  Primary Emergency Contact: [address] Phone: [phone]  Relation: Parent   There are no active problems to display for this patient. Date: 10/26/2017       Level of Buddhism/Spiritual Activity:  []         Involved in tavares tradition/spiritual practice    []         Not involved in tavares tradition/spiritual practice  []         Spiritually oriented    []         Claims no spiritual orientation    []         seeking spiritual identity  []         Feels alienated from Adventism practice/tradition  []         Feels angry about Adventism practice/tradition  [x]         Spirituality/Adventism tradition is a resource for coping at this time.   []         Not able to assess due to medical condition    Services Provided Today:  [x]         crisis intervention    []         reading Scriptures  [x]         spiritual assessment    [x]         prayer  [x]         empathic listening/emotional support  []         rites and rituals (cite in comments)  []         life review     []         Adventism support  []         theological development   []         advocacy  []         ethical dialog     []         blessing  []         bereavement support    [x]         support to family  []         anticipatory grief support   []         help with AMD  []         spiritual guidance    []         meditation      Spiritual Care Needs  [x]         Emotional Support  [x]         Spiritual/Buddhism Care  []         Loss/Adjustment  []         Advocacy/Referral                /Ethics  []         No needs expressed at               this time  []         Other: (note in               comments)  [address]  []         Follow up visits with pt/family  []         Provide materials  []         Schedule sacraments  []         Contact Community               Clergy  [x]         Follow up as needed  []         Other: (note in               comments)     Follow up visit to room 3004 in support of patient Lela Collins and partner Marck Ventura. Found both awake and rested. Lela Collins admitted to struggling to find the words to express her feelings about the status of her pregnancy. Stated that she felt overwhelmed--which was echoed by Olvindwayne Audrey. The couple appeared to be in mutual support of one another. Reported that they had planned on their daughter being born healthy---especially since she had come so far in her pregnancy. Both expressed an awareness that God's will is paramount---but that their disappointment is still deep. Provided assurance that though staff cannot undo what has been done---she is in a safe place with people who care--and that they are indeed good parents. Shared spoken prayer for comfort and strength. Notified the couple of how to request additional  support--which is available as needed. Consulted with Nurse [facility] after visit. She indicated that delivery could take some time. 2400 [facility]'s Staff  (Asif 5 Patient Care Specialist)   Paging Service [phone])

## 2017-10-26 NOTE — PROGRESS NOTES
Labor Progress Note  Patient seen, fetal heart rate and contraction pattern evaluated, patient examined. Occasional cramps. Visit Vitals    /59    Pulse 63    Temp 98.8 °F (37.1 °C)    Resp 16    Ht 5' 2\" (1.575 m)    Wt 121.1 kg (267 lb)    BMI 48.83 kg/m2         Physical Exam:  Cervical Exam:  2/50/-3 softer  Membranes:  Intact  Uterine Activity: None    Assessment/Plan:  IOL 2/2 IUFD, anencephaly of fetus. Cervix softer than earlier exam - 100mcg misoprostol placed now and discussed with patient anticipate switching to pitocin around 10pm this evening. Discussed epidural.  Ancef every 8 hours due to pt having a  shunt.

## 2017-10-26 NOTE — PROGRESS NOTES
Patient seen and assessed for induction progress. She denies feeling any pain or contractions. Visit Vitals    /71 (BP 1 Location: Left arm, BP Patient Position: At rest)    Pulse 82    Temp 98.1 °F (36.7 °C)    Resp 14    Ht 5' 2\" (1.575 m)    Wt 121.1 kg (267 lb)    LMP 03/22/2017    BMI 48.83 kg/m2     Alert, appropriate  Abdomen soft and non-tender  Cervix closed/50/high    50 mcg misoprostol placed vaginally after verbal consent from patient    Will plan to continue with 50 mcg vaginally q 4-6 hours, with likely need for pitocin once cervix has ripened.     Mona Cummins MD  12:19 AM

## 2017-10-26 NOTE — PROGRESS NOTES
Bedside shift change report given to YORDY López RN and ZULEMA Hurtado RN (oncoming nurse) by Shruthi Chino RN (offgoing nurse). Report included the following information SBAR, Kardex and MAR.     0830 pt assessment completed. She and FOB are resting with lights off at this time. No distress noted. 1000 pt continues to rest quietly in bed.    1200 pt eating lunch. Pastoral care in to see pt and the FOB. Teodoro Alvarez at bedside placing cytotec. Vag exam remains at 2/50%/-2    1530 sat with patient at length discusssing plan of care and her wishes for delivery. Pt wishes for skin to skin directly after birth and then to be cleaned up and put in a diaper, blanket and hat. Provided pt with a blanket . 600 [facility] cytotec placed by Dr Babak Alvarez. 1820 pt having some nausea, zofran given. 1945 Bedside shift change report given to Tammi and TOMY Llanes RN (oncoming nurse) by YORDY Lóepz RN (offgoing nurse). Report included the following information SBAR, Kardex, Intake/Output and MAR.

## 2017-10-26 NOTE — PROGRESS NOTES
In to meet pt and assume care. IOL at 31 weeks due to IUFD, pregnancy complicated by anencephaly. Just received misoprostol at 6AM - will increase dose to 100mcg and plan to administer in 4-6 hours from last dose. Pt does not feel ctx at this time.

## 2017-10-27 LAB
ABO + RH BLD: NORMAL
ALBUMIN SERPL-MCNC: 2.3 G/DL (ref 3.5–5)
ALBUMIN/GLOB SERPL: 0.8 {RATIO} (ref 1.1–2.2)
ALP SERPL-CCNC: 99 U/L (ref 45–117)
ALT SERPL-CCNC: 11 U/L (ref 12–78)
ANION GAP SERPL CALC-SCNC: 9 MMOL/L (ref 5–15)
APTT PPP: 29.8 SEC (ref 22.1–32.5)
AST SERPL-CCNC: 11 U/L (ref 15–37)
BILIRUB SERPL-MCNC: 0.3 MG/DL (ref 0.2–1)
BLOOD GROUP ANTIBODIES SERPL: NORMAL
BUN SERPL-MCNC: 6 MG/DL (ref 6–20)
BUN/CREAT SERPL: 10 (ref 12–20)
CALCIUM SERPL-MCNC: 7.7 MG/DL (ref 8.5–10.1)
CHLORIDE SERPL-SCNC: 109 MMOL/L (ref 97–108)
CO2 SERPL-SCNC: 21 MMOL/L (ref 21–32)
CREAT SERPL-MCNC: 0.61 MG/DL (ref 0.55–1.02)
ERYTHROCYTE [DISTWIDTH] IN BLOOD BY AUTOMATED COUNT: 13.2 % (ref 11.5–14.5)
FIBRINOGEN PPP-MCNC: 443 MG/DL (ref 200–475)
GLOBULIN SER CALC-MCNC: 2.9 G/DL (ref 2–4)
GLUCOSE SERPL-MCNC: 99 MG/DL (ref 65–100)
HCT VFR BLD AUTO: 33.9 % (ref 35–47)
HGB BLD-MCNC: 11.1 G/DL (ref 11.5–16)
INR PPP: 0.9 (ref 0.9–1.1)
MCH RBC QN AUTO: 32.1 PG (ref 26–34)
MCHC RBC AUTO-ENTMCNC: 32.7 G/DL (ref 30–36.5)
MCV RBC AUTO: 98 FL (ref 80–99)
PLATELET # BLD AUTO: 225 K/UL (ref 150–400)
POTASSIUM SERPL-SCNC: 3.8 MMOL/L (ref 3.5–5.1)
PROT SERPL-MCNC: 5.2 G/DL (ref 6.4–8.2)
PROTHROMBIN TIME: 9.4 SEC (ref 9–11.1)
RBC # BLD AUTO: 3.46 M/UL (ref 3.8–5.2)
SODIUM SERPL-SCNC: 139 MMOL/L (ref 136–145)
SPECIMEN EXP DATE BLD: NORMAL
THERAPEUTIC RANGE,PTTT: NORMAL SECS (ref 58–77)
WBC # BLD AUTO: 12.8 K/UL (ref 3.6–11)

## 2017-10-27 PROCEDURE — 80053 COMPREHEN METABOLIC PANEL: CPT | Performed by: OBSTETRICS & GYNECOLOGY

## 2017-10-27 PROCEDURE — 74011250636 HC RX REV CODE- 250/636: Performed by: OBSTETRICS & GYNECOLOGY

## 2017-10-27 PROCEDURE — 85610 PROTHROMBIN TIME: CPT | Performed by: OBSTETRICS & GYNECOLOGY

## 2017-10-27 PROCEDURE — 65270000029 HC RM PRIVATE

## 2017-10-27 PROCEDURE — 74011250636 HC RX REV CODE- 250/636

## 2017-10-27 PROCEDURE — 85027 COMPLETE CBC AUTOMATED: CPT | Performed by: OBSTETRICS & GYNECOLOGY

## 2017-10-27 PROCEDURE — 74011250637 HC RX REV CODE- 250/637: Performed by: OBSTETRICS & GYNECOLOGY

## 2017-10-27 PROCEDURE — 36415 COLL VENOUS BLD VENIPUNCTURE: CPT | Performed by: OBSTETRICS & GYNECOLOGY

## 2017-10-27 PROCEDURE — 75410000002 HC LABOR FEE PER 1 HR

## 2017-10-27 RX ORDER — NALBUPHINE HYDROCHLORIDE 10 MG/ML
INJECTION, SOLUTION INTRAMUSCULAR; INTRAVENOUS; SUBCUTANEOUS
Status: COMPLETED
Start: 2017-10-27 | End: 2017-10-27

## 2017-10-27 RX ORDER — NALBUPHINE HYDROCHLORIDE 10 MG/ML
10 INJECTION, SOLUTION INTRAMUSCULAR; INTRAVENOUS; SUBCUTANEOUS
Status: COMPLETED | OUTPATIENT
Start: 2017-10-27 | End: 2017-10-27

## 2017-10-27 RX ADMIN — CEFAZOLIN 3 G: 1 INJECTION, POWDER, FOR SOLUTION INTRAMUSCULAR; INTRAVENOUS; PARENTERAL at 05:48

## 2017-10-27 RX ADMIN — Medication 16 MILLI-UNITS/MIN: at 08:10

## 2017-10-27 RX ADMIN — SODIUM CHLORIDE, SODIUM LACTATE, POTASSIUM CHLORIDE, AND CALCIUM CHLORIDE 125 ML/HR: 600; 310; 30; 20 INJECTION, SOLUTION INTRAVENOUS at 04:30

## 2017-10-27 RX ADMIN — NALBUPHINE HYDROCHLORIDE 10 MG: 10 INJECTION, SOLUTION INTRAMUSCULAR; INTRAVENOUS; SUBCUTANEOUS at 09:18

## 2017-10-27 RX ADMIN — NALBUPHINE HYDROCHLORIDE 10 MG: 10 INJECTION, SOLUTION INTRAMUSCULAR; INTRAVENOUS; SUBCUTANEOUS at 13:04

## 2017-10-27 RX ADMIN — SODIUM CHLORIDE, SODIUM LACTATE, POTASSIUM CHLORIDE, AND CALCIUM CHLORIDE 125 ML/HR: 600; 310; 30; 20 INJECTION, SOLUTION INTRAVENOUS at 19:58

## 2017-10-27 RX ADMIN — NALBUPHINE HYDROCHLORIDE 10 MG: 10 INJECTION, SOLUTION INTRAMUSCULAR; INTRAVENOUS; SUBCUTANEOUS at 22:52

## 2017-10-27 RX ADMIN — CEFAZOLIN 3 G: 1 INJECTION, POWDER, FOR SOLUTION INTRAMUSCULAR; INTRAVENOUS; PARENTERAL at 14:20

## 2017-10-27 RX ADMIN — ACETAMINOPHEN 650 MG: 325 TABLET, FILM COATED ORAL at 17:16

## 2017-10-27 RX ADMIN — CEFAZOLIN 3 G: 1 INJECTION, POWDER, FOR SOLUTION INTRAMUSCULAR; INTRAVENOUS; PARENTERAL at 22:04

## 2017-10-27 RX ADMIN — SODIUM CHLORIDE, SODIUM LACTATE, POTASSIUM CHLORIDE, AND CALCIUM CHLORIDE 125 ML/HR: 600; 310; 30; 20 INJECTION, SOLUTION INTRAVENOUS at 13:20

## 2017-10-27 NOTE — PROGRESS NOTES
Labor Progress Note  Patient seen, fetal heart rate and contraction pattern evaluated, patient examined. Pt received nubain for cramping earlier. Patient Vitals for the past 1 hrs:   BP Temp Pulse Resp   10/27/17 1157 108/67 97.5 °F (36.4 °C) 75 14       Physical Exam:  Cervical Exam:  2 cm dilated    100% effaced    -3 station    Membranes:  Intact  Uterine Activity: ?every 3-4 mins      Assessment/Plan:  Slow progress of induction for IUFD/anencephaly. Working on prior [facility] for genetic testing of baby. Continue pitocin induction.

## 2017-10-27 NOTE — PROGRESS NOTES
1940- Bedside, Verbal and Written shift change report given to Alistair Johnston RN (oncoming nurse) by YORDY Garcia RN (offgoing nurse). Report included the following information SBAR, Kardex, Intake/Output, MAR and Accordion. 65- Dr. Triston Lewis in to see pt. Pt asleep. Introduced herself to the pt's partner and discussed plan of care. 18- Verbal and Written shift change report given to JEANMARIE Villafana (oncoming nurse) by Alistair Johnston RN (offgoing nurse). Report included the following information SBAR, Kardex, MAR and Accordion. Pt asleep.

## 2017-10-27 NOTE — PROGRESS NOTES
0745 Verbal shift change report given to Haresh Gonzalez  (oncoming nurse) by Cam Parker RN (offgoing nurse). Report included the following information SBAR, Procedure Summary, Intake/Output, MAR and Recent Results. Patient sleeping.  Seven.Minor Dr. Tim Chadwick at bedside assessing patient. SVE 2/90/-2. Discussing plan of care with patient. Patient crying and asking why the baby's heart beat had stopped. Dr. Tim Chadwick comforting patient and explaining that the diagnosis of ancephaly is incompatible with life. Discussing genetic testing with patient. Patient wanting to discuss with father of the baby if they should do genetic testing. Plan to continue with pitocin. [id number] Cytology called and will call back with information on how much genetic testing may cost.  0830 Cytology called and informed that basic genetic testing is about $861.   3144 Dr. Jessica Lovelace business office called and informed of patient's need for pre-authorization for genetic testing. Office will follow up with getting the authorization. 200 Dr. Tim Chadwick at bedside. SVE 2/100/-2. Discussing plan of care. Plan to continue with pitocin. All questions addressed at this time.

## 2017-10-27 NOTE — PROGRESS NOTES
Had assumed care of this patient this morning and on further review have clarified pt's medical history and plan of delivery prior to sign out this evening to the [facility] hospitalist.  The patient had a  shunt placed in 2011 due to pseudotumor cerebri. Her symptoms (at the time headache, vision changes) completely resolved after placement of the shunt and it has not required revision since that time. The patient was scheduled to see a neurosurgeon at [facility] next month however has now presented with IUFD for earlier delivery than anticipated. I reviewed this case in detail with MFM Dr Aaron Peterson today as well as a neurosurgery attending and both report with the  shunt in place the patient is safe for laboring/vaginal birth and there is not risk with pushing. The pt plans on epidural for pain management (has not had complaint of pain yet during IOL) and this was reviewed last night with anesthesia.

## 2017-10-27 NOTE — PROGRESS NOTES
Labor Progress Note  Patient seen, fetal heart rate and contraction pattern evaluated, patient examined. Pt with mild cramping only. No data found. Physical Exam:  Cervical Exam:  2 cm dilated    90% effaced    -2 station    Membranes:  Intact  Uterine Activity: occasional on 14 pitocin    Assessment/Plan:  41 y/o  at 32 2/7 weeks with IUFD/anencephaly here for induction of labor s/p several doses of cytotec yesterday and now on pitocin started last night. Will continue to titrate pitocin to adequate labor. Epidural prn. Discussed option for genetic testing (recommended by MFM per initial visit note). On Ancef every 8 hours for endocarditis ppx.  shunt in place and pt has been okay'd for vaginal delivery/pushing.

## 2017-10-27 NOTE — PROGRESS NOTES
Jamey transferred from [address] [facility] [address]. Pt is sleeping. 46 Dr Miguelito Acuna at the bedside to discuss plan of care. Ordered to stop pitocin until 1930. Allow pt to eat and shower.  Pt has showered and eaten . Back on monitor with pitocin restarted. 0457 Pt requesting another dose of nubain. Discussed the option of an epidural.  Pt refuses that option at this time. 0600 Pt was sleeping on 20mu of pitocin. Pitocin stopped in lieu of possible  section this morning to decrease risk of postpartum hemorrhage. 0630 Dr Rosa Isela Romero given update on phone. 0700 Dr Rosa Isela Romero at bedside. SVE /floating. AROM. 0120 Per Dr Rosa Isela Romero, restart pitocin at 10mu and increase by 2mu every 15-20 minutes. 0221 Bedside and Verbal shift change report given to MELBA López  RN (oncoming nurse) by Allie Louie RN (offgoing nurse). Report included the following information SBAR, Kardex, Intake/Output, MAR and Recent Results.

## 2017-10-27 NOTE — PROGRESS NOTES
OB HOSPITALIST (Late Entry)    Assumed care of patient from Dr. Jenn Portillo. Patient is a  @ 31 1/7 wks undergoing IOL for IUFD in the setting of fetal anencephaly. Patient has had several doses of cytotec and is now undergoing pitocin augmentation. Patient does have a h/o intracranial hypertension with a shunt. Dr. Jenn Portillo has verified that patient is ok to labor and push with delivery (see her note). Patient was sleeping when I went in to speak with her therefore I only spoke with her significant other. Condolences were offered and the plan of care reviewed.

## 2017-10-27 NOTE — PROGRESS NOTES
2100 Dr Catalina Fernandes called for more information on patient's intracranial shunt. Patient states it started with tension headaches in 2011. After seeing the dr she began seeing white spots in her vision. 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 She was referred to neurology and was put on a medication (but isn't sure which one) and had an MRI. The MRI did not find anything wrong. Since the fluid in her brain was not draining fast enough the neurologist placed an intracranial shunt. Patient has not had a shunt revision and states having no problems with it. Last neurology appointment was 2 years ago. Has an appointment for November due to pregnancy. 6302 Dr Catalina Fernandes called to say that she spoke with a neurologist and they agreed it was ok to start pitocin.

## 2017-10-27 NOTE — PROGRESS NOTES
Labor Progress Note  Patient seen, contraction pattern evaluated, patient examined. Pt reports mild cramping at this time - denies need for pain medication. She does have a headache and desires tylenol for the discomfort.      Visit Vitals    /67 (BP 1 Location: Left arm, BP Patient Position: At rest)    Pulse 75    Temp 97.5 °F (36.4 °C)    Resp 14    Ht 5' 2\" (1.575 m)    Wt 121.1 kg (267 lb)    BMI 48.83 kg/m2         Physical Exam:  Cervical Exam: 2/100/-3 (not rechecked)  Membranes: intact  Uterine Activity: occasional, not in a regular pattern    Assessment/Plan:  Induction for IUFD/anencephaly - slow progress, reviewed options with pt as she is currently on 24 of pitocin  Pt desires to stop IV pitocin at this time and take a break - plan for pt to eat dinner, shower and then restart pitocin per protocol  Unsure if prior auth for genetic testing has been completed - have not yet heard from office, will attempt to follow-up    Steve Sports, DO

## 2017-10-28 PROCEDURE — 74011250637 HC RX REV CODE- 250/637: Performed by: OBSTETRICS & GYNECOLOGY

## 2017-10-28 PROCEDURE — 75410000003 HC RECOV DEL/VAG/CSECN EA 0.5 HR

## 2017-10-28 PROCEDURE — A4300 CATH IMPL VASC ACCESS PORTAL: HCPCS

## 2017-10-28 PROCEDURE — 10907ZC DRAINAGE OF AMNIOTIC FLUID, THERAPEUTIC FROM PRODUCTS OF CONCEPTION, VIA NATURAL OR ARTIFICIAL OPENING: ICD-10-PCS | Performed by: OBSTETRICS & GYNECOLOGY

## 2017-10-28 PROCEDURE — 74011250636 HC RX REV CODE- 250/636: Performed by: OBSTETRICS & GYNECOLOGY

## 2017-10-28 PROCEDURE — 88307 TISSUE EXAM BY PATHOLOGIST: CPT | Performed by: OBSTETRICS & GYNECOLOGY

## 2017-10-28 PROCEDURE — 65210000002 HC RM PRIVATE GYN

## 2017-10-28 PROCEDURE — 74011250636 HC RX REV CODE- 250/636: Performed by: ANESTHESIOLOGY

## 2017-10-28 PROCEDURE — 77030007880 HC KT SPN EPDRL BBMI -B

## 2017-10-28 PROCEDURE — 74011250636 HC RX REV CODE- 250/636

## 2017-10-28 PROCEDURE — 75410000000 HC DELIVERY VAGINAL/SINGLE

## 2017-10-28 RX ORDER — HYDROCORTISONE ACETATE PRAMOXINE HCL 2.5; 1 G/100G; G/100G
CREAM TOPICAL AS NEEDED
Status: DISCONTINUED | OUTPATIENT
Start: 2017-10-28 | End: 2017-10-30 | Stop reason: HOSPADM

## 2017-10-28 RX ORDER — NALBUPHINE HYDROCHLORIDE 10 MG/ML
10 INJECTION, SOLUTION INTRAMUSCULAR; INTRAVENOUS; SUBCUTANEOUS
Status: DISCONTINUED | OUTPATIENT
Start: 2017-10-28 | End: 2017-10-30 | Stop reason: HOSPADM

## 2017-10-28 RX ORDER — NALBUPHINE HYDROCHLORIDE 10 MG/ML
INJECTION, SOLUTION INTRAMUSCULAR; INTRAVENOUS; SUBCUTANEOUS
Status: COMPLETED
Start: 2017-10-28 | End: 2017-10-28

## 2017-10-28 RX ORDER — BUPIVACAINE HYDROCHLORIDE 5 MG/ML
30 INJECTION, SOLUTION EPIDURAL; INTRACAUDAL AS NEEDED
Status: DISCONTINUED | OUTPATIENT
Start: 2017-10-28 | End: 2017-10-28 | Stop reason: HOSPADM

## 2017-10-28 RX ORDER — BUPIVACAINE HYDROCHLORIDE 2.5 MG/ML
30 INJECTION, SOLUTION EPIDURAL; INFILTRATION; INTRACAUDAL ONCE
Status: DISCONTINUED | OUTPATIENT
Start: 2017-10-28 | End: 2017-10-28 | Stop reason: HOSPADM

## 2017-10-28 RX ORDER — FENTANYL/BUPIVACAINE/NS/PF 2-1250MCG
10 PREFILLED PUMP RESERVOIR EPIDURAL CONTINUOUS
Status: DISCONTINUED | OUTPATIENT
Start: 2017-10-28 | End: 2017-10-28 | Stop reason: HOSPADM

## 2017-10-28 RX ORDER — NALOXONE HYDROCHLORIDE 0.4 MG/ML
0.4 INJECTION, SOLUTION INTRAMUSCULAR; INTRAVENOUS; SUBCUTANEOUS AS NEEDED
Status: DISCONTINUED | OUTPATIENT
Start: 2017-10-28 | End: 2017-10-28 | Stop reason: HOSPADM

## 2017-10-28 RX ORDER — NALOXONE HYDROCHLORIDE 0.4 MG/ML
0.4 INJECTION, SOLUTION INTRAMUSCULAR; INTRAVENOUS; SUBCUTANEOUS AS NEEDED
Status: DISCONTINUED | OUTPATIENT
Start: 2017-10-28 | End: 2017-10-30 | Stop reason: HOSPADM

## 2017-10-28 RX ORDER — ONDANSETRON 4 MG/1
4 TABLET, ORALLY DISINTEGRATING ORAL
Status: DISCONTINUED | OUTPATIENT
Start: 2017-10-28 | End: 2017-10-30 | Stop reason: HOSPADM

## 2017-10-28 RX ORDER — OXYTOCIN/RINGER'S LACTATE 20/1000 ML
125-500 PLASTIC BAG, INJECTION (ML) INTRAVENOUS ONCE
Status: ACTIVE | OUTPATIENT
Start: 2017-10-28 | End: 2017-10-28

## 2017-10-28 RX ORDER — SIMETHICONE 80 MG
80 TABLET,CHEWABLE ORAL
Status: DISCONTINUED | OUTPATIENT
Start: 2017-10-28 | End: 2017-10-30 | Stop reason: HOSPADM

## 2017-10-28 RX ORDER — NALOXONE HYDROCHLORIDE 0.4 MG/ML
0.4 INJECTION, SOLUTION INTRAMUSCULAR; INTRAVENOUS; SUBCUTANEOUS AS NEEDED
Status: DISCONTINUED | OUTPATIENT
Start: 2017-10-28 | End: 2017-10-28 | Stop reason: SDUPTHER

## 2017-10-28 RX ORDER — FENTANYL CITRATE 50 UG/ML
100 INJECTION, SOLUTION INTRAMUSCULAR; INTRAVENOUS ONCE
Status: COMPLETED | OUTPATIENT
Start: 2017-10-28 | End: 2017-10-28

## 2017-10-28 RX ORDER — FENTANYL CITRATE 50 UG/ML
100 INJECTION, SOLUTION INTRAMUSCULAR; INTRAVENOUS ONCE
Status: DISPENSED | OUTPATIENT
Start: 2017-10-28 | End: 2017-10-28

## 2017-10-28 RX ORDER — LIDOCAINE HYDROCHLORIDE AND EPINEPHRINE 15; 5 MG/ML; UG/ML
4.5 INJECTION, SOLUTION EPIDURAL AS NEEDED
Status: DISCONTINUED | OUTPATIENT
Start: 2017-10-28 | End: 2017-10-28 | Stop reason: HOSPADM

## 2017-10-28 RX ORDER — FENTANYL/BUPIVACAINE/NS/PF 2-1250MCG
1-16 PREFILLED PUMP RESERVOIR EPIDURAL CONTINUOUS
Status: DISCONTINUED | OUTPATIENT
Start: 2017-10-28 | End: 2017-10-28 | Stop reason: HOSPADM

## 2017-10-28 RX ORDER — IBUPROFEN 400 MG/1
800 TABLET ORAL EVERY 8 HOURS
Status: DISCONTINUED | OUTPATIENT
Start: 2017-10-28 | End: 2017-10-30 | Stop reason: HOSPADM

## 2017-10-28 RX ORDER — OXYCODONE AND ACETAMINOPHEN 5; 325 MG/1; MG/1
1 TABLET ORAL
Status: DISCONTINUED | OUTPATIENT
Start: 2017-10-28 | End: 2017-10-30 | Stop reason: HOSPADM

## 2017-10-28 RX ORDER — FENTANYL CITRATE 50 UG/ML
100 INJECTION, SOLUTION INTRAMUSCULAR; INTRAVENOUS ONCE
Status: DISCONTINUED | OUTPATIENT
Start: 2017-10-28 | End: 2017-10-28 | Stop reason: HOSPADM

## 2017-10-28 RX ADMIN — FENTANYL CITRATE 100 MCG: 50 INJECTION, SOLUTION INTRAMUSCULAR; INTRAVENOUS at 09:15

## 2017-10-28 RX ADMIN — NALBUPHINE HYDROCHLORIDE 10 MG: 10 INJECTION, SOLUTION INTRAMUSCULAR; INTRAVENOUS; SUBCUTANEOUS at 05:00

## 2017-10-28 RX ADMIN — NALBUPHINE HYDROCHLORIDE 10 MG: 10 INJECTION, SOLUTION INTRAMUSCULAR; INTRAVENOUS; SUBCUTANEOUS at 02:03

## 2017-10-28 RX ADMIN — Medication 10 ML: at 14:00

## 2017-10-28 RX ADMIN — CEFAZOLIN 3 G: 1 INJECTION, POWDER, FOR SOLUTION INTRAMUSCULAR; INTRAVENOUS; PARENTERAL at 21:56

## 2017-10-28 RX ADMIN — CEFAZOLIN 3 G: 1 INJECTION, POWDER, FOR SOLUTION INTRAMUSCULAR; INTRAVENOUS; PARENTERAL at 14:43

## 2017-10-28 RX ADMIN — Medication 10 ML: at 21:59

## 2017-10-28 RX ADMIN — SODIUM CHLORIDE, SODIUM LACTATE, POTASSIUM CHLORIDE, AND CALCIUM CHLORIDE 125 ML/HR: 600; 310; 30; 20 INJECTION, SOLUTION INTRAVENOUS at 01:24

## 2017-10-28 RX ADMIN — IBUPROFEN 800 MG: 400 TABLET ORAL at 21:58

## 2017-10-28 RX ADMIN — CEFAZOLIN 3 G: 1 INJECTION, POWDER, FOR SOLUTION INTRAMUSCULAR; INTRAVENOUS; PARENTERAL at 06:01

## 2017-10-28 RX ADMIN — Medication 10 MILLI-UNITS/MIN: at 07:32

## 2017-10-28 RX ADMIN — OXYCODONE HYDROCHLORIDE AND ACETAMINOPHEN 1 TABLET: 5; 325 TABLET ORAL at 21:57

## 2017-10-28 RX ADMIN — OXYCODONE HYDROCHLORIDE AND ACETAMINOPHEN 1 TABLET: 5; 325 TABLET ORAL at 11:04

## 2017-10-28 NOTE — PROGRESS NOTES
Notified by nurse that Ms Robin Gonzalez in L&D-04 had delivered. When  arrived to patient's room nurse stated that Ms Robin Gonzalez declined  visit at that time; shared she would probably want to see  after she had had more time. Chaplains will continue to be available for patient/family support as needed/able. : Rev. Dave Reynaga.  Chung Bess; [facility], to contact [phone] [address] call: [phone]

## 2017-10-28 NOTE — PROGRESS NOTES
Labor Progress Note  Patient seen and examined. Pitocin was escalated up to 20 mu/min without visible signs of increased contractions. Physical Exam:  Visit Vitals    BP (!) 89/54    Pulse 60    Temp 98.2 °F (36.8 °C)    Resp 16    Ht 5' 2\" (1.575 m)    Wt 121.1 kg (267 lb)    LMP 03/22/2017    BMI 48.83 kg/m2     [place] negative for contractions  Cervix 4/100/presenting part not palpable; AROM done with sterile [place] after verbal consent from patient. Assessment/Plan:    [id number] at 31w3d undergoing IOL due to IUFD (anencephaly) at 34 weeks.  Maternal history of intracranial hypertension with  shunt; on Ancef for maternal infection prophylaxis; anesthesiologist aware. Now s/p AROM; she wishes to shower now but following that, will retitrate pitocin from starting point of 10 mu/min.     Nicki Andrew MD

## 2017-10-28 NOTE — PROGRESS NOTES
Labor Progress Note  Patient sleeping intermittently but reports uncomfortable contractions when awake. Physical Exam:  Visit Vitals    BP 91/54    Pulse (!) 57    Temp 97.5 °F (36.4 °C)    Resp 14    Ht 5' 2\" (1.575 m)    Wt 121.1 kg (267 lb)    LMP 2017    BMI 48.83 kg/m2   Dilkon shows rare contractions only  Cervical exam deferred    Assessment/Plan:     at 31w3d undergoing IOL due to IUFD (anencephaly) at 34 weeks. Maternal history of intracranial hypertension with  shunt; on Ancef for maternal infection prophylaxis; anesthesiologist aware. Will continue to titrate pitocin per protocol; if maximal dose is reached without evidence of progress, will try AROM.     Aniket Glasgow MD

## 2017-10-28 NOTE — PROGRESS NOTES
Post-Partum Day Number 0 Progress Note    Patient seen after vaginal delivery this morning of IUFD with anencephaly with the [facility] hospitalist. Pt sitting up in bed with baby next to bed and family present in the room. Pt reports she is feeling ok both emotionally and physically. She has not yet gotten up since delivery. Pastoral care came by the room but pt was not yet ready to see her - she does desire a visit later today, will notify nursing staff when she is ready. Pt has no complaints at this time. Vitals:  Patient Vitals for the past 24 hrs:   BP Temp Pulse Resp   10/28/17 1101 95/51 - (!) 55 -   10/28/17 1048 (!) 87/43 - (!) 54 -   10/28/17 1030 116/57 - (!) 54 -   10/28/17 1016 101/56 - (!) 55 -   10/28/17 1001 116/65 - (!) 51 -   10/28/17 0946 116/57 - (!) 50 -   10/28/17 0931 121/59 - (!) 55 -   10/28/17 0914 124/58 98.3 °F (36.8 °C) (!) 48 16   10/28/17 0737 97/54 98.2 °F (36.8 °C) 76 16   10/28/17 0126 (!) 89/54 98.2 °F (36.8 °C) 60 16   10/27/17 2125 91/54 - (!) 57 14   10/27/17 1723 112/54 97.5 °F (36.4 °C) (!) 51 14     Temp (24hrs), Av.1 °F (36.7 °C), Min:97.5 °F (36.4 °C), Max:98.3 °F (36.8 °C)      Vital signs stable, afebrile. Exam:  Patient without distress.                Abdomen soft, obese, fundus firm, appropriately tender to palpation               Lower extremities- nontender with trace edema; no cords    Labs:   Recent Results (from the past 24 hour(s))   CBC W/O DIFF    Collection Time: 10/27/17  9:25 PM   Result Value Ref Range    WBC 12.8 (H) 3.6 - 11.0 K/uL    RBC 3.46 (L) 3.80 - 5.20 M/uL    HGB 11.1 (L) 11.5 - 16.0 g/dL    HCT 33.9 (L) 35.0 - 47.0 %    MCV 98.0 80.0 - 99.0 FL    MCH 32.1 26.0 - 34.0 PG    MCHC 32.7 30.0 - 36.5 g/dL    RDW 13.2 11.5 - 14.5 %    PLATELET 748 018 - 653 K/uL   COAGULATION SCREEN    Collection Time: 10/27/17  9:25 PM   Result Value Ref Range    INR 0.9 0.9 - 1.1      Prothrombin time 9.4 9.0 - 11.1 sec    aPTT 29.8 22.1 - 32.5 sec    aPTT, therapeutic range     58.0 - 77.0 SECS    Fibrinogen 443 200 - 892 mg/dL   METABOLIC PANEL, COMPREHENSIVE    Collection Time: 10/27/17  9:25 PM   Result Value Ref Range    Sodium 139 136 - 145 mmol/L    Potassium 3.8 3.5 - 5.1 mmol/L    Chloride 109 (H) 97 - 108 mmol/L    CO2 21 21 - 32 mmol/L    Anion gap 9 5 - 15 mmol/L    Glucose 99 65 - 100 mg/dL    BUN 6 6 - 20 MG/DL    Creatinine 0.61 0.55 - 1.02 MG/DL    BUN/Creatinine ratio 10 (L) 12 - 20      GFR est AA >60 >60 ml/min/1.73m2    GFR est non-AA >60 >60 ml/min/1.73m2    Calcium 7.7 (L) 8.5 - 10.1 MG/DL    Bilirubin, total 0.3 0.2 - 1.0 MG/DL    ALT (SGPT) 11 (L) 12 - 78 U/L    AST (SGOT) 11 (L) 15 - 37 U/L    Alk. phosphatase 99 45 - 117 U/L    Protein, total 5.2 (L) 6.4 - 8.2 g/dL    Albumin 2.3 (L) 3.5 - 5.0 g/dL    Globulin 2.9 2.0 - 4.0 g/dL    A-G Ratio 0.8 (L) 1.1 - 2.2       Lab Results   Component Value Date/Time    Rubella, External immune 07/10/2017    HBsAg, External negative 07/10/2017    HIV, External negative 07/10/2017    Gonorrhea, External negative 07/10/2017    Chlamydia, External negative 07/10/2017     Baby girl - IUFD and anencephaly     Assessment and Plan:  Patient appears to be having uncomplicated post-partum course. Plan to transfer patient to [address] [place] [address] for postpartum care. Discussed possibility of discharge home tomorrow vs Monday - pt will discuss with family and we will review again tomorrow. Continue routine perineal care. Plan for pastoral care visit later today. A positive/Rubella immune.      Mc Humphries, DO  10/28/17

## 2017-10-28 NOTE — L&D DELIVERY NOTE
This patient was 30 or more weeks gestation at the time of [facility] go-live. For complete information pertaining to this patient's pregnancy, please refer to the paper chart and ACOG form. Delivery Note    Obstetrician:  Roxana Sherwood MD    Assistant: none    Pre-Delivery Diagnosis: IUFD Anecephaly    Post-Delivery Diagnosis: Stillborn infant Female    Intrapartum Event: Prolonged latent phase    Procedure: Spontaneous vaginal delivery    Epidural: NO    Monitor:  None    Indications for instrumental delivery: none    Estimated Blood Loss:  250  Episiotomy: n/a    Laceration(s):  none    Laceration(s) repair: NO    Presentation: Cephalic    Fetal Description: huddleston    Fetal Position: Face    Birth Weight: pending    Birth Length: pending    Apgar - One Minute: 0    Apgar - Five Minutes: 0    Umbilical Cord: 3 vessels present    Specimens: placenta           Complications:  prolonged first stage, Anencephalic IUFD           Cord Blood Results:   Information for the patient's :  Leanora Shoulder FD [[id number]]   No results found for: PCTABR, [address], PCTDIG, BILI, ABORH, ABORHEXT    Prenatal Labs:     Lab Results   Component Value Date/Time    ABO/Rh(D) A POSITIVE 10/25/2017 06:54 PM    ABO,Rh A positive 07/10/2017    HBsAg, External negative 07/10/2017    HIV, External negative 07/10/2017    Rubella, External immune 07/10/2017    Gonorrhea, External negative 07/10/2017    Chlamydia, External negative 07/10/2017        Attending Attestation: I performed the procedure    Signed By:  Roxana Sherwood MD     2017

## 2017-10-28 NOTE — PROGRESS NOTES
Bedside shift change report given to Edger Dance RN (oncoming nurse) by Casey Ingram RN (offgoing nurse). Report included the following information SBAR, Kardex and [facility].     8441 baby head visible emergency light pulled , Dr Markel Welsh at nurses station and at bedside for delivery    6689  of a IUFD baby girl. 56 Dr José Miguel Alatorre called and discussed POC. She would like to continue abx for 24 hrs from delivery and have pt transferred to 3N after recovery. Eladia 23 baby in room taking  pictures. [phone] phone report given to Chanelle Rich on 3N.    1405 pt wheeled over to 3N in stable condition.

## 2017-10-28 NOTE — PROGRESS NOTES
Care of patient assumed as of  -  with IUFD/fetal anencephaly at 34 weeks, known to me from prior coverage on 10/25; PMH significant for intracranial hypertension with  shunt. Thus far she has received multiple doses of misoprostol on 10/25-10/26 followed by IV pitocin for almost 24 hours with escalation to maximum dose of 24 mu/min. A 1730 today she was given a break from pitocin and had a meal; pitocin is now being retitrated. Visit Vitals    /54    Pulse (!) 51    Temp 97.5 °F (36.4 °C)    Resp 14    Ht 5' 2\" (1.575 m)    Wt 121.1 kg (267 lb)    LMP 2017    BMI 48.83 kg/m2     PE deferred  Last labs 10/25 were normal    Discussed plan of care with patient. She is comfortable with the plan for retitration of pitocin tonight. Could also consider AROM at some point, although it would be preferable if she were able to deliver en caul to decrease likelihood of significant dystocia. If pitocin dose is again maximized without response, it may be worthwhile to attempt AROM as a final measure to try to achieve vaginal delivery and avoid . Recommended repeat CBC and coag panel; will also need updated blood bank sample as of tomorrow - will draw sample along with current labs.     Rangel Gallardo MD  8:58 PM

## 2017-10-28 NOTE — PROGRESS NOTES
Telephone call received from bedside nurse caring for patient. Nurse wanted a  to see patient for assessment of needs. Raul Truong will not see patient today as she just delivered IUFD. Writer will plan to see this patient tomorrow.   Tiffanie Ramirez LCSW

## 2017-10-29 PROCEDURE — 74011250637 HC RX REV CODE- 250/637: Performed by: OBSTETRICS & GYNECOLOGY

## 2017-10-29 PROCEDURE — 65210000002 HC RM PRIVATE GYN

## 2017-10-29 PROCEDURE — 74011250636 HC RX REV CODE- 250/636: Performed by: OBSTETRICS & GYNECOLOGY

## 2017-10-29 RX ADMIN — IBUPROFEN 800 MG: 400 TABLET ORAL at 06:15

## 2017-10-29 RX ADMIN — OXYCODONE HYDROCHLORIDE AND ACETAMINOPHEN 1 TABLET: 5; 325 TABLET ORAL at 21:53

## 2017-10-29 RX ADMIN — Medication 10 ML: at 06:15

## 2017-10-29 RX ADMIN — OXYCODONE HYDROCHLORIDE AND ACETAMINOPHEN 1 TABLET: 5; 325 TABLET ORAL at 18:20

## 2017-10-29 RX ADMIN — CEFAZOLIN 3 G: 1 INJECTION, POWDER, FOR SOLUTION INTRAMUSCULAR; INTRAVENOUS; PARENTERAL at 06:15

## 2017-10-29 RX ADMIN — Medication 5 ML: at 14:00

## 2017-10-29 RX ADMIN — IBUPROFEN 800 MG: 400 TABLET ORAL at 21:54

## 2017-10-29 NOTE — PROGRESS NOTES
Notified by Magaly's nurse that patient was tearful and would like to see the . When  arrived to room 722 Shaheed Ohara was tearful & lying in bed with the  infant by her side; DELILAH was lying close by bedside of patient. Led couple to process their grief and share their feelings about their loss. DELILAH also became tearful as he shared how much they loved her and looked forward to having her. Couple shared some of the dreams and hopes that they had had. Fabian Horvath spoke frequently of her tavares and said that even though they did not understand God's ways she felt God had a purpose for them; she stated that this experience made her want to be a better person. Couple shared with  the pictures that had been taken of the infant and they both began to smile as they talked about her.  acknowledged both the deep the infant had brought to their lives as well as the grief they currently felt. With their permission, had prayer of commendation for infant and for support for family. Reassured them of continued prayers on their behalf and of ongoing  availability for support. : . Laurie Hooper.  Jamie Craft; [facility], to contact [phone] [address] call: [phone]

## 2017-10-29 NOTE — PROGRESS NOTES
Follow-up call placed to patient in her room for introduction of self and role. Patient thanked writer for call, but does not feel she needs a face to face visit with this writer. Patient states she has all of the resource information she needs at this time regarding grief support and  services. Patient denies barriers to discharge or other needs. Patient encouraged to contact her insurance provider for a list of therapists if she feels she needs bereavement therapy. Patient identified having adequate family and social support.  Rashmi Barajas LCSW, CRM

## 2017-10-29 NOTE — PROGRESS NOTES
Post-Partum Day Number 1 Progress Note    Patient seen and examined this morning. Doing as well as can be expected after an IUFD with anencephaly on 10/28/17. She is doing well from a post-partum standpoint, no significant complaints health-wise. She is tearful this morning and able to express her grief. Pastoral care has been in to see her this morning and she has great support through her family and [employer]. She is voiding without difficulty, normal lochia. Ambulating ad marv. No fever/chills, CP, SOB, N/V or leg pain bilaterally. She does admit to pelvic cramping, which is improved with Motrin and Percocet. Vitals:  Patient Vitals for the past 24 hrs:   BP Temp Pulse Resp SpO2   10/29/17 0101 108/69 98.1 °F (36.7 °C) (!) 59 16 99 %   10/28/17 2143 115/73 98.3 °F (36.8 °C) 79 18 99 %   10/28/17 1425 108/78 98 °F (36.7 °C) 68 18 97 %   10/28/17 1101 95/51 - (!) 55 - -   10/28/17 1048 (!) 87/43 - (!) 54 - -   10/28/17 1030 116/57 - (!) 54 - -   10/28/17 1016 101/56 - (!) 55 - -   10/28/17 1001 116/65 - (!) 51 - -   10/28/17 0946 116/57 - (!) 50 - -     Temp (24hrs), Av.1 °F (36.7 °C), Min:98 °F (36.7 °C), Max:98.3 °F (36.8 °C)      Vital signs stable, afebrile. Exam:  Patient without distress. Abdomen soft, obese, fundus firm, nontender               Lower extremities- nontender with trace edema; no cords    Labs:   Lab Results   Component Value Date/Time    Rubella, External immune 07/10/2017    HBsAg, External negative 07/10/2017    HIV, External negative 07/10/2017    Gonorrhea, External negative 07/10/2017    Chlamydia, External negative 07/10/2017     Baby girl - IUFD, anencephaly     Assessment and Plan:  Patient appears to be having routine post-partum course physically. She is able to express her grief and has good emotional support. Continue pastoral care PRN. Plan to continue routine perineal care and maternal education.  Plan discharge tomorrow if no problems occur per pt's request. Rubella immune/A positive.             Carlos Yarbrough,   10/29/17

## 2017-10-30 VITALS
SYSTOLIC BLOOD PRESSURE: 119 MMHG | HEART RATE: 74 BPM | BODY MASS INDEX: 49.13 KG/M2 | WEIGHT: 267 LBS | TEMPERATURE: 98.3 F | HEIGHT: 62 IN | RESPIRATION RATE: 18 BRPM | DIASTOLIC BLOOD PRESSURE: 71 MMHG | OXYGEN SATURATION: 100 %

## 2017-10-30 PROCEDURE — 74011250637 HC RX REV CODE- 250/637: Performed by: OBSTETRICS & GYNECOLOGY

## 2017-10-30 RX ORDER — SERTRALINE HYDROCHLORIDE 50 MG/1
50 TABLET, FILM COATED ORAL DAILY
Status: DISCONTINUED | OUTPATIENT
Start: 2017-10-30 | End: 2017-10-30 | Stop reason: HOSPADM

## 2017-10-30 RX ORDER — IBUPROFEN 800 MG/1
800 TABLET ORAL
Qty: 30 TAB | Refills: 0 | Status: SHIPPED | OUTPATIENT
Start: 2017-10-30 | End: 2018-05-19

## 2017-10-30 RX ORDER — OXYCODONE AND ACETAMINOPHEN 5; 325 MG/1; MG/1
1 TABLET ORAL
Qty: 20 TAB | Refills: 0 | Status: SHIPPED | OUTPATIENT
Start: 2017-10-30 | End: 2018-05-19

## 2017-10-30 RX ORDER — SERTRALINE HYDROCHLORIDE 50 MG/1
50 TABLET, FILM COATED ORAL DAILY
Qty: 30 TAB | Refills: 1 | Status: SHIPPED | OUTPATIENT
Start: 2017-10-30

## 2017-10-30 RX ADMIN — SERTRALINE HYDROCHLORIDE 50 MG: 50 TABLET ORAL at 09:52

## 2017-10-30 RX ADMIN — IBUPROFEN 800 MG: 400 TABLET ORAL at 15:08

## 2017-10-30 RX ADMIN — OXYCODONE HYDROCHLORIDE AND ACETAMINOPHEN 1 TABLET: 5; 325 TABLET ORAL at 10:00

## 2017-10-30 RX ADMIN — IBUPROFEN 800 MG: 400 TABLET ORAL at 06:05

## 2017-10-30 NOTE — PROGRESS NOTES
Post-Partum Day Number 2 Progress Note    Patient  without significant complaints. Grieving appropriately and at times tearful during our discussion but she is able to see happy moments as well and showed me her baby's keepsake box and is proud of the momentos she has. Admits grief, h/o depression and does feel it would be a good idea to start meds now. Worried about how she'll feel when she goes home and can't have the baby by her side. Voiding without difficulty, normal lochia. Vitals:  Patient Vitals for the past 24 hrs:   BP Temp Pulse Resp SpO2   10/30/17 0046 129/75 98.1 °F (36.7 °C) 67 18 98 %   10/29/17 2020 110/62 98 °F (36.7 °C) 84 16 98 %   10/29/17 1002 113/64 97.8 °F (36.6 °C) 90 16 97 %     Temp (24hrs), Av °F (36.7 °C), Min:97.8 °F (36.6 °C), Max:98.1 °F (36.7 °C)      Vital signs stable, afebrile. Exam:  Patient without distress. Abdomen soft, fundus firm, nontender               Lower extremities- nontender without edema; no cords    Labs: No results found for this or any previous visit (from the past 24 hour(s)). Assessment and Plan:  Patient appears to be having uncomplicated post-partum course. Discharge today-instructions reviewed. A POSITIVE/RI  Awaiting prior auth for genetics testing and will take sample if approved.   F/U 1-2 wks

## 2017-10-30 NOTE — DISCHARGE SUMMARY
Patient ID:  Oxana Todd  [id number]  34 y.o.  1980    Admit Date: 10/25/2017    Discharge Date: 10/30/2017     Admitting Physician: Pawan Albert MD  Attending Physician: Dana Perry MD    Admission Diagnoses: Fetal demise, greater than 22 weeks, antepartum  Fetal demise, greater than 22 weeks, antepartum    Discharge Diagnoses: Same as above with  producing a nonviable infant. Information for the patient's :  Mi Almaguer [[id number]]   One Minute Apgar: 0 (Filed from Delivery Summary)  Five  Minute Apgar: 0 (Filed from Delivery Summary)         Additional Diagnoses: fetal anencephaly. Maternal Labs:   Lab Results   Component Value Date/Time    HBsAg, External negative 07/10/2017    HIV, External negative 07/10/2017    Rubella, External immune 07/10/2017       Cord Blood Results:   Information for the patient's :  Mi Almaguer [[id number]]   No results found for: PCTABR, ABORH, PCTDIG, BILI, ABORH, ABORHEXT          History of Present Illness:   OB History      Para Term  AB Living    3    2     SAB TAB Ectopic Molar Multiple Live Births                 Admitted for induction of labor. Hospital Course:   Patient was admitted as above and delivered via  . Please the chart for details. The postpartum course was unremarkable. She was deemed stable for discharge home on day 2. Follow-up:  She was instructed to follow-up with her obstetrician in 1 week. Results of Postpartum Depression Screening:  EPDS             Signed:   Dana Perry MD  10/30/2017  8:15 AM

## 2017-10-30 NOTE — PROGRESS NOTES
Bedside shift change report given to Jason Moss (oncoming nurse) by Diana López RN (offgoing nurse). Report included the following information SBAR, Kardex, Intake/Output, MAR and Recent Results.

## 2017-10-30 NOTE — DISCHARGE INSTRUCTIONS
Discharge Instructions for Vaginal Delivery    Patient ID:  Dru Padilla  [id number]  40 y.o.  1980    Take Home Medications   See medication list    Follow-up Appointment:  Follow-up with Dr. Corinne Mccabe in 1 week. Follow-up care is a key part of your treatment and safety. Be sure to make and go to all appointments, and call your doctor if you are having problems. Its also a good idea to know your test results and keep a list of the medicines you take. Activity  Avoid anything in your vagina for 6 weeks (no intercourse, tampons, or douching). You may drive unless you are taking prescription pain medications. Climbing stairs and light lifting are ok after a vaginal delivery unless your doctor tells you not to. You may gradually work up to exercise over the next few weeks. Diet  You may eat a regular diet but you may want to avoid heavy, greasy foods and other foods that could increase constipation. Wound care  If you have stitches, continue to rinse with a squirt bottle of warm water each time you use the bathroom for about 2 weeks. Your stitches will gradually dissolve over several weeks. Sitz baths are also helpful to keep the wound clean, encourage healing, and to help with pain associated with the stitches or hemorrhoids. You can use either a sitz bath basin or a bathtub filled with 2-3\" inches of plain warm water. Soak for 10 minutes 3 times a day. Pain Management  If you were not given a prescription pain medication, you can take over the counter pain medicines like Tylenol (acetominophen), Advil or Motrin (ibuprofen). You can take acetominophen and ibuprofen together, alternating doses every few hours.   You will get the most relief if you take the maximum dose:  · Tylenol or acetominophen 1000 mg every 6 hours (equivalent to 2 Extra Strength Tylenols every 6 hours)  · Motrin or Advil (generic ibuprofen) 800 mg every 8 hours (4 tablets or capsules every 8 hours)  If you were given a prescription pain medication, you can take ibuprofen along with it (doses as above), but not Tylenol. Use ibuprofen as the main medication, and take the prescription medication if needed for more severe pain not relieved by the ibuprofen. Your goal should be to take only the minimum necessary amounts of the prescription medication (narcotic), as these pain medicines can be habit-forming and will worsen or cause constipation. Most patients will find that within a couple of days, their pain is adequately controlled using only over-the-counter medications. A heating pad can also be very helpful for cramping or back pain. Over-the-counter hemorrhoid wipes and ointments are fine to use if you have hemorrhoids. Constipation  You may find that bowel movements are irregular after delivery and that you have a tendency to be constipated. If you have stitches (and especially if you had a more severe tear called a third- or fourth-degree), your bowel movements will be more comfortable if they are soft. A stool softener such as Colace (docusate) can safely be taken daily if needed. If you become constipated you can use a laxative such as Dulcolax, Miralax or Milk of Magnesia. Don't wait until constipation is severe- take something sooner rather than later and you will feel much better! Your Recovery: What to Expect at Home  If you're experiencing soreness or swelling in your bottom, this should improve over the next few days to 2 weeks. You are likely to have some back pain or general body aches or muscle soreness. This should improve with acetominophen or ibuprofen. If your legs were swollen during your pregnancy or as a result of IV fluids given during your hospital stay, this should go away in a few days to a week. When should you call for help? Call 911 anytime you think you may need emergency care. For example, call if:  You pass out (lose consciousness).   You have sudden chest pain and shortness of breath, or you cough up blood. You have severe pain in your belly. Call your doctor now or seek immediate medical care if:  You have heavy vaginal bleeding that soaks one or more pads in an hour, or you have large clots (golf ball size or larger). Your have foul-smelling discharge from your vagina. You are sick to your stomach or cannot keep fluids down. You have pain that does not get better after you take pain medicine. You have signs of infection, such as: Increased pain in your abdomen or vaginal area  Red streaks, warmth, or tenderness of your breasts. A fever of 101 or greater (taken by mouth). You have signs of a blood clot, such as:  Pain in your calf, back of knee, thigh, or groin. Redness and swelling in your leg or groin. You have trouble passing urine or stool, especially if you have pain or swelling in your lower belly; or if you are unable to have a bowel movement after taking a laxative. You have a fast or pounding heartbeat.

## 2018-05-18 PROCEDURE — 99284 EMERGENCY DEPT VISIT MOD MDM: CPT

## 2018-05-19 ENCOUNTER — APPOINTMENT (OUTPATIENT)
Dept: GENERAL RADIOLOGY | Age: 38
End: 2018-05-19
Attending: PHYSICIAN ASSISTANT
Payer: SELF-PAY

## 2018-05-19 ENCOUNTER — HOSPITAL ENCOUNTER (EMERGENCY)
Age: 38
Discharge: HOME | End: 2018-05-19
Attending: EMERGENCY MEDICINE
Payer: SELF-PAY

## 2018-05-19 VITALS
BODY MASS INDEX: 51.56 KG/M2 | RESPIRATION RATE: 18 BRPM | WEIGHT: 280.2 LBS | TEMPERATURE: 98.5 F | HEART RATE: 79 BPM | DIASTOLIC BLOOD PRESSURE: 97 MMHG | SYSTOLIC BLOOD PRESSURE: 126 MMHG | HEIGHT: 62 IN | OXYGEN SATURATION: 97 %

## 2018-05-19 DIAGNOSIS — M77.32 HEEL SPUR, LEFT: ICD-10-CM

## 2018-05-19 DIAGNOSIS — S13.4XXA WHIPLASH INJURIES, INITIAL ENCOUNTER: Primary | ICD-10-CM

## 2018-05-19 DIAGNOSIS — M51.37 DEGENERATIVE DISC DISEASE AT L5-S1 LEVEL: ICD-10-CM

## 2018-05-19 DIAGNOSIS — S39.012A BACK STRAIN, INITIAL ENCOUNTER: ICD-10-CM

## 2018-05-19 LAB — HCG UR QL: NEGATIVE

## 2018-05-19 PROCEDURE — 81025 URINE PREGNANCY TEST: CPT

## 2018-05-19 PROCEDURE — 73590 X-RAY EXAM OF LOWER LEG: CPT

## 2018-05-19 PROCEDURE — 72100 X-RAY EXAM L-S SPINE 2/3 VWS: CPT

## 2018-05-19 PROCEDURE — 74011250637 HC RX REV CODE- 250/637: Performed by: PHYSICIAN ASSISTANT

## 2018-05-19 RX ORDER — CYCLOBENZAPRINE HCL 10 MG
10 TABLET ORAL
Qty: 20 TAB | Refills: 0 | Status: SHIPPED | OUTPATIENT
Start: 2018-05-19 | End: 2018-11-06

## 2018-05-19 RX ORDER — NAPROXEN 500 MG/1
500 TABLET ORAL
Qty: 20 TAB | Refills: 0 | Status: SHIPPED | OUTPATIENT
Start: 2018-05-19 | End: 2018-11-06 | Stop reason: CLARIF

## 2018-05-19 RX ORDER — CYCLOBENZAPRINE HCL 10 MG
10 TABLET ORAL
Status: COMPLETED | OUTPATIENT
Start: 2018-05-19 | End: 2018-05-19

## 2018-05-19 RX ORDER — NAPROXEN 250 MG/1
500 TABLET ORAL
Status: DISCONTINUED | OUTPATIENT
Start: 2018-05-19 | End: 2018-05-19 | Stop reason: HOSPADM

## 2018-05-19 RX ORDER — HYDROCODONE BITARTRATE AND ACETAMINOPHEN 5; 325 MG/1; MG/1
1 TABLET ORAL
Status: COMPLETED | OUTPATIENT
Start: 2018-05-19 | End: 2018-05-19

## 2018-05-19 RX ORDER — HYDROCODONE BITARTRATE AND ACETAMINOPHEN 5; 325 MG/1; MG/1
1 TABLET ORAL
Qty: 10 TAB | Refills: 0 | Status: SHIPPED | OUTPATIENT
Start: 2018-05-19

## 2018-05-19 RX ADMIN — CYCLOBENZAPRINE HYDROCHLORIDE 10 MG: 10 TABLET, FILM COATED ORAL at 01:46

## 2018-05-19 RX ADMIN — HYDROCODONE BITARTRATE AND ACETAMINOPHEN 1 TABLET: 5; 325 TABLET ORAL at 01:46

## 2018-05-19 NOTE — ED NOTES
Lopez Weaver PA reviewed discharge instructions with the patient. The patient verbalized understanding. Vitals stable. Pt ambulatory to discharge.

## 2018-05-19 NOTE — LETTER
[address] 
[facility] EMERGENCY DEPT 
[address] [place] [address] [address] [id number] [phone] Work/School Note Date: 5/18/2018 To Whom It May concern: 
 
Lavon Sprague was seen and treated today in the emergency room by the following provider(s): 
Attending Provider: Ayd Virk MD 
Physician Assistant: FEDERICO Benítez. Lavon Sprague may return to work on 5/22/18 or sooner, if feeling better. Sincerely, Sydney Richardson, PA
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
## 2018-05-19 NOTE — DISCHARGE INSTRUCTIONS
Learning About Degenerative Disc Disease  What is degenerative disc disease? Degenerative disc disease is not really a disease. It's a term used to describe the normal changes in your spinal discs as you age. Spinal discs are small, spongy discs that separate the bones (vertebrae) that make up the spine. The discs act as shock absorbers for the spine, so it can flex, bend, and twist.  Degenerative disc disease can take place in one or more places along the spine. It most often occurs in the discs in the lower back and the neck. What causes it? As we age, our spinal discs break down, or degenerate. This breakdown causes the symptoms of degenerative disc disease in some people. When the discs break down, they can lose fluid and dry out, and their outer layers can have tiny cracks or tears. This leads to less padding and less space between the bones in the spine. The body reacts to this by making bony growths on the spine called bone spurs. These spurs can press on the spinal nerve roots or spinal cord. This can cause pain and can affect how well the nerves work. What are the symptoms? Many people with degenerative disc disease have no pain. But others have severe pain or other symptoms that limit their activities. Some of the most common symptoms are:  · Pain in the back or neck. Where the pain occurs depends on which discs are affected. · Pain that gets worse when you move, such as bending over, reaching up, or twisting. · Pain that may occur in the rear end (buttocks), arm, or leg if a nerve is pinched. · Numbness or tingling in your arm or leg. How is it diagnosed? A doctor can often diagnose degenerative disc disease while doing a physical exam. If your exam shows no signs of a serious condition, imaging tests (such as an X-ray) aren't likely to help your doctor find the cause of your symptoms.   Sometimes degenerative disc disease is found when an X-ray is taken for another reason, such as an injury or other health problem. But even if the doctor finds degenerative disc disease, that doesn't always mean that you will have symptoms. How is it treated? There are several things you can do at home to manage pain from this problem. · To relieve pain, use ice or heat (whichever feels better) on the affected area. ¨ Put ice or a cold pack on the area for 10 to 20 minutes at a time. Put a thin cloth between the ice and your skin. ¨ Put a warm water bottle, a heating pad set on low, or a warm cloth on your back. Put a thin cloth between the heating pad and your skin. Do not go to sleep with a heating pad on your skin. · Ask your doctor if you can take acetaminophen (such as Tylenol) or nonsteroidal anti-inflammatory drugs, such as ibuprofen or naproxen. Your doctor can prescribe stronger medicines if needed. Be safe with medicines. Read and follow all instructions on the label. · Get some exercise every day. Exercise is one of the best ways to help your back feel better and stay better. It's best to start each exercise slowly. You may notice a little soreness, and that's okay. But if an exercise makes your pain worse, stop doing it. Here are things you can try:  ¨ Walking. It's the simplest and maybe the best activity for your back. It gets your blood moving and helps your muscles stay strong. ¨ Exercises that gently stretch and strengthen your stomach, back, and leg muscles. The stronger those muscles are, the better they're able to protect your back. If you have constant or severe pain in your back or spine, you may need other treatments, such as physical therapy. In some cases, your doctor may suggest surgery. Follow-up care is a key part of your treatment and safety. Be sure to make and go to all appointments, and call your doctor if you are having problems. It's also a good idea to know your test results and keep a list of the medicines you take. Where can you learn more?   Go to http://lin-blair.info/. Enter J615 in the search box to learn more about \"Learning About Degenerative Disc Disease. \"  Current as of: March 21, 2017  Content Version: 11.5  © 7200-4502 [employer]. Care instructions adapted under license by [employer] (which disclaims liability or warranty for this information). If you have questions about a medical condition or this instruction, always ask your healthcare professional. [place] [address] any warranty or liability for your use of this information. Neck Strain or Sprain: Rehab Exercises  Your Care Instructions  Here are some examples of typical rehabilitation exercises for your condition. Start each exercise slowly. Ease off the exercise if you start to have pain. Your doctor or physical therapist will tell you when you can start these exercises and which ones will work best for you. How to do the exercises  Neck rotation    1. Sit in a firm chair, or stand up straight. 2. Keeping your chin level, turn your head to the right, and hold for 15 to 30 seconds. 3. Turn your head to the left and hold for 15 to 30 seconds. 4. Repeat 2 to 4 times to each side. Neck stretches    1. Look straight ahead, and tip your right ear to your right shoulder. Do not let your left shoulder rise up as you tip your head to the right. 2. Hold for 15 to 30 seconds. 3. Tilt your head to the left. Do not let your right shoulder rise up as you tip your head to the left. 4. Hold for 15 to 30 seconds. 5. Repeat 2 to 4 times to each side. Forward neck flexion    1. Sit in a firm chair, or stand up straight. 2. Bend your head forward. 3. Hold for 15 to 30 seconds. 4. Repeat 2 to 4 times. Lateral (side) bend strengthening    1. With your right hand, place your first two fingers on your right temple.   2. Start to bend your head to the side while using gentle pressure from your fingers to keep your head from bending. 3. Hold for about 6 seconds. 4. Repeat 8 to 12 times. 5. Switch hands and repeat the same exercise on your left side. Forward bend strengthening    1. Place your first two fingers of either hand on your forehead. 2. Start to bend your head forward while using gentle pressure from your fingers to keep your head from bending. 3. Hold for about 6 seconds. 4. Repeat 8 to 12 times. Neutral position strengthening    1. Using one hand, place your fingertips on the back of your head at the top of your neck. 2. Start to bend your head backward while using gentle pressure from your fingers to keep your head from bending. 3. Hold for about 6 seconds. 4. Repeat 8 to 12 times. Chin tuck    1. Lie on the floor with a rolled-up towel under your neck. Your head should be touching the floor. 2. Slowly bring your chin toward your chest.  3. Hold for a count of 6, and then relax for up to 10 seconds. 4. Repeat 8 to 12 times. Follow-up care is a key part of your treatment and safety. Be sure to make and go to all appointments, and call your doctor if you are having problems. It's also a good idea to know your test results and keep a list of the medicines you take. Where can you learn more? Go to http://lin-blair.info/. Enter M679 in the search box to learn more about \"Neck Strain or Sprain: Rehab Exercises. \"  Current as of: March 21, 2017  Content Version: 11.4  © 4787-4912 [employer]. Care instructions adapted under license by [employer] (which disclaims liability or warranty for this information). If you have questions about a medical condition or this instruction, always ask your healthcare professional. [place] [address] any warranty or liability for your use of this information. Back Strain: Care Instructions  Your Care Instructions    Back strain happens when you overstretch, or pull, a muscle in your back.  You may hurt your back in an accident or when you exercise or lift something. Most back pain will get better with rest and time. You can take care of yourself at home to help your back heal.  Follow-up care is a key part of your treatment and safety. Be sure to make and go to all appointments, and call your doctor if you are having problems. It's also a good idea to know your test results and keep a list of the medicines you take. How can you care for yourself at home? · Try to stay as active as you can, but stop or reduce any activity that causes pain. · Put ice or a cold pack on the sore muscle for 10 to 20 minutes at a time to stop swelling. Try this every 1 to 2 hours for 3 days (when you are awake) or until the swelling goes down. Put a thin cloth between the ice pack and your skin. · After 2 or 3 days, apply a heating pad on low or a warm cloth to your back. Some doctors suggest that you go back and forth between hot and cold treatments. · Take pain medicines exactly as directed. ¨ If the doctor gave you a prescription medicine for pain, take it as prescribed. ¨ If you are not taking a prescription pain medicine, ask your doctor if you can take an over-the-counter medicine. · Try sleeping on your side with a pillow between your legs. Or put a pillow under your knees when you lie on your back. These measures can ease pain in your lower back. · Return to your usual level of activity slowly. When should you call for help? Call 911 anytime you think you may need emergency care. For example, call if:  ? · You are unable to move a leg at all. ?Call your doctor now or seek immediate medical care if:  ? · You have new or worse symptoms in your legs, belly, or buttocks. Symptoms may include:  ¨ Numbness or tingling. ¨ Weakness. ¨ Pain. ? · You lose bladder or bowel control. ? Watch closely for changes in your health, and be sure to contact your doctor if you are not getting better as expected.   Where can you learn more?  Go to http://lin-blair.info/. Enter L132 in the search box to learn more about \"Back Strain: Care Instructions. \"  Current as of: March 21, 2017  Content Version: 11.4  © 5268-9632 [employer]. Care instructions adapted under license by [employer] (which disclaims liability or warranty for this information). If you have questions about a medical condition or this instruction, always ask your healthcare professional. [address] any warranty or liability for your use of this information. Whiplash: Care Instructions  Your Care Instructions  Whiplash occurs when your head is suddenly forced forward and then snapped backward, as might happen in a car accident or sports injury. This can cause pain and stiffness in your neck. Your head, chest, shoulders, and arms also may hurt. Most whiplash gets better with home care. Your doctor may advise you to take medicine to relieve pain or relax your muscles. He or she may suggest exercise and physical therapy to increase flexibility and relieve pain. You can try wearing a neck (cervical) collar to support your neck. For a while you probably will need to avoid lifting and other activities that can strain the neck. Follow-up care is a key part of your treatment and safety. Be sure to make and go to all appointments, and call your doctor if you are having problems. It's also a good idea to know your test results and keep a list of the medicines you take. How can you care for yourself at home? · Take pain medicines exactly as directed. ¨ If the doctor gave you a prescription medicine for pain, take it as prescribed. ¨ If you are not taking a prescription pain medicine, ask your doctor if you can take an over-the-counter medicine. ¨ Do not take two or more pain medicines at the same time unless the doctor told you to. Many pain medicines have acetaminophen, which is Tylenol.  Too much acetaminophen (Tylenol) can be harmful. · You can try using a soft foam collar to support your neck for short periods of time. You can buy one at most [employer]. Do not wear the collar more than 2 or 3 days unless your doctor tells you to. · You can try using heat and ice to see if it helps. ¨ Try using a heating pad on a low or medium setting for 15 to 20 minutes every 2 to 3 hours. Try a warm shower in place of one session with the heating pad. You can also buy single-use heat wraps that last up to 8 hours. ¨ You can also try an ice pack for 10 to 15 minutes every 2 to 3 hours. · Do not do anything that makes the pain worse. Take it easy for a couple of days. You can do your usual activities if they do not hurt your neck or put it at risk for more stress or injury. Avoid lifting, sports, or other activities that might strain your neck. · Try sleeping on a special neck pillow. Place it under your neck, not under your head. Placing a tightly rolled-up towel under your neck while you sleep will also work. If you use a neck pillow or rolled towel, do not use your regular pillow at the same time. · Once your neck pain is gone, do exercises to stretch your neck and back and make them stronger. Your doctor or physical therapist can tell you which exercises are best.  When should you call for help? Call 911 anytime you think you may need emergency care. For example, call if:  ? · You are unable to move an arm or a leg at all. ?Call your doctor now or seek immediate medical care if:  ? · You have new or worse symptoms in your arms, legs, chest, belly, or buttocks. Symptoms may include:  ¨ Numbness or tingling. ¨ Weakness. ¨ Pain. ? · You lose bladder or bowel control. ? Watch closely for changes in your health, and be sure to contact your doctor if:  ? · You are not getting better as expected. Where can you learn more? Go to http://becki.info/.   Enter R475 in the search box to learn more about \"Whiplash: Care Instructions. \"  Current as of: March 21, 2017  Content Version: 11.4  © 9767-2904 [employer], [employer]. Care instructions adapted under license by [employer] (which disclaims liability or warranty for this information). If you have questions about a medical condition or this instruction, always ask your healthcare professional. [address] any warranty or liability for your use of this information.
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
## 2018-11-06 ENCOUNTER — HOSPITAL ENCOUNTER (EMERGENCY)
Age: 38
Discharge: HOME | End: 2018-11-06
Attending: EMERGENCY MEDICINE
Payer: SELF-PAY

## 2018-11-06 VITALS
OXYGEN SATURATION: 100 % | TEMPERATURE: 99 F | BODY MASS INDEX: 51.24 KG/M2 | RESPIRATION RATE: 16 BRPM | SYSTOLIC BLOOD PRESSURE: 136 MMHG | HEART RATE: 96 BPM | HEIGHT: 62 IN | DIASTOLIC BLOOD PRESSURE: 59 MMHG | WEIGHT: 278.44 LBS

## 2018-11-06 DIAGNOSIS — M62.838 TRAPEZIUS MUSCLE SPASM: ICD-10-CM

## 2018-11-06 DIAGNOSIS — Z86.59 HISTORY OF DEPRESSION: ICD-10-CM
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
## 2018-11-06 PROCEDURE — 74011250636 HC RX REV CODE- 250/636: Performed by: PHYSICIAN ASSISTANT

## 2018-11-06 PROCEDURE — 74011250637 HC RX REV CODE- 250/637: Performed by: PHYSICIAN ASSISTANT

## 2018-11-06 PROCEDURE — 96372 THER/PROPH/DIAG INJ SC/IM: CPT

## 2018-11-06 PROCEDURE — 99283 EMERGENCY DEPT VISIT LOW MDM: CPT

## 2018-11-06 RX ORDER — DIAZEPAM 5 MG/1
5 TABLET ORAL
Status: COMPLETED | OUTPATIENT
Start: 2018-11-06 | End: 2018-11-06

## 2018-11-06 RX ORDER — CYCLOBENZAPRINE HCL 10 MG
10 TABLET ORAL
Qty: 20 TAB | Refills: 0 | Status: SHIPPED | OUTPATIENT
Start: 2018-11-06

## 2018-11-06 RX ORDER — KETOROLAC TROMETHAMINE 30 MG/ML
60 INJECTION, SOLUTION INTRAMUSCULAR; INTRAVENOUS
Status: COMPLETED | OUTPATIENT
Start: 2018-11-06 | End: 2018-11-06

## 2018-11-06 RX ORDER — DIFLUNISAL 500 MG/1
500 TABLET, FILM COATED ORAL 2 TIMES DAILY
Qty: 20 TAB | Refills: 0 | Status: SHIPPED | OUTPATIENT
Start: 2018-11-06

## 2018-11-06 RX ADMIN — KETOROLAC TROMETHAMINE 60 MG: 30 INJECTION, SOLUTION INTRAMUSCULAR at 17:42

## 2018-11-06 RX ADMIN — DIAZEPAM 5 MG: 5 TABLET ORAL at 17:42
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
## 2018-11-06 NOTE — LETTER
[address] 
[facility] EMERGENCY DEPT 
[address] [address] 
[phone] Work/School Note Date: 11/6/2018 To Whom It May concern: 
 
Vandy Schlatter was seen and treated today in the emergency room by the following provider(s): 
Attending Provider: Sara Villela MD 
Physician Assistant: FEDERICO Gregory. Vandy Schlatter may return to work on 11/8/2018. Sincerely, 
 
 
 
 
Michael Pryor.  [place], [place]
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
## 2019-03-27 ENCOUNTER — APPOINTMENT (OUTPATIENT)
Dept: GENERAL RADIOLOGY | Age: 39
End: 2019-03-27
Attending: PHYSICIAN ASSISTANT
Payer: MEDICAID

## 2019-03-27 ENCOUNTER — HOSPITAL ENCOUNTER (EMERGENCY)
Age: 39
Discharge: HOME | End: 2019-03-27
Attending: EMERGENCY MEDICINE
Payer: MEDICAID

## 2019-03-27 VITALS
HEIGHT: 62 IN | OXYGEN SATURATION: 100 % | TEMPERATURE: 98 F | HEART RATE: 79 BPM | RESPIRATION RATE: 18 BRPM | BODY MASS INDEX: 51.85 KG/M2 | WEIGHT: 281.75 LBS | DIASTOLIC BLOOD PRESSURE: 85 MMHG | SYSTOLIC BLOOD PRESSURE: 135 MMHG

## 2019-03-27 DIAGNOSIS — M51.37 DEGENERATIVE DISC DISEASE AT L5-S1 LEVEL: Primary | ICD-10-CM

## 2019-03-27 LAB
APPEARANCE UR: CLEAR
BACTERIA URNS QL MICRO: ABNORMAL /HPF
BILIRUB UR QL CFM: NEGATIVE
COLOR UR: ABNORMAL
EPITH CASTS URNS QL MICRO: ABNORMAL /LPF
GLUCOSE UR STRIP.AUTO-MCNC: NEGATIVE MG/DL
HCG UR QL: NEGATIVE
HGB UR QL STRIP: ABNORMAL
HYALINE CASTS URNS QL MICRO: ABNORMAL /LPF (ref 0–5)
KETONES UR QL STRIP.AUTO: NEGATIVE MG/DL
LEUKOCYTE ESTERASE UR QL STRIP.AUTO: NEGATIVE
MUCOUS THREADS URNS QL MICRO: ABNORMAL /LPF
NITRITE UR QL STRIP.AUTO: NEGATIVE
PH UR STRIP: 5.5 [PH] (ref 5–8)
PROT UR STRIP-MCNC: NEGATIVE MG/DL
RBC #/AREA URNS HPF: ABNORMAL /HPF (ref 0–5)
SP GR UR REFRACTOMETRY: 1.03 (ref 1–1.03)
UA: UC IF INDICATED,UAUC: ABNORMAL
UROBILINOGEN UR QL STRIP.AUTO: 1 EU/DL (ref 0.2–1)
WBC URNS QL MICRO: ABNORMAL /HPF (ref 0–4)

## 2019-03-27 PROCEDURE — 96372 THER/PROPH/DIAG INJ SC/IM: CPT

## 2019-03-27 PROCEDURE — 81001 URINALYSIS AUTO W/SCOPE: CPT

## 2019-03-27 PROCEDURE — 81025 URINE PREGNANCY TEST: CPT

## 2019-03-27 PROCEDURE — 99283 EMERGENCY DEPT VISIT LOW MDM: CPT

## 2019-03-27 PROCEDURE — 87086 URINE CULTURE/COLONY COUNT: CPT

## 2019-03-27 PROCEDURE — 74011250637 HC RX REV CODE- 250/637: Performed by: PHYSICIAN ASSISTANT

## 2019-03-27 PROCEDURE — 72100 X-RAY EXAM L-S SPINE 2/3 VWS: CPT

## 2019-03-27 PROCEDURE — 74011250636 HC RX REV CODE- 250/636: Performed by: PHYSICIAN ASSISTANT

## 2019-03-27 RX ORDER — ACETAMINOPHEN 325 MG/1
650 TABLET ORAL
Status: COMPLETED | OUTPATIENT
Start: 2019-03-27 | End: 2019-03-27

## 2019-03-27 RX ORDER — IBUPROFEN 600 MG/1
600 TABLET ORAL
Qty: 30 TAB | Refills: 0 | Status: SHIPPED | OUTPATIENT
Start: 2019-03-27

## 2019-03-27 RX ORDER — PREDNISONE 20 MG/1
40 TABLET ORAL DAILY
Qty: 5 TAB | Refills: 0 | Status: SHIPPED | OUTPATIENT
Start: 2019-03-27 | End: 2019-04-01

## 2019-03-27 RX ORDER — KETOROLAC TROMETHAMINE 30 MG/ML
30 INJECTION, SOLUTION INTRAMUSCULAR; INTRAVENOUS
Status: COMPLETED | OUTPATIENT
Start: 2019-03-27 | End: 2019-03-27

## 2019-03-27 RX ADMIN — ACETAMINOPHEN 650 MG: 325 TABLET ORAL at 07:53

## 2019-03-27 RX ADMIN — KETOROLAC TROMETHAMINE 30 MG: 30 INJECTION, SOLUTION INTRAMUSCULAR; INTRAVENOUS at 08:13
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
## 2019-03-27 NOTE — DISCHARGE INSTRUCTIONS
Patient Education       You have some deterioration in your spine. I'm going to treat you with some steroids and some anti-inflammatories. Follow up with your doctor. Learning About Degenerative Disc Disease  What is degenerative disc disease? Degenerative disc disease is not really a disease. It's a term used to describe the normal changes in your spinal discs as you age. Spinal discs are small, spongy discs that separate the bones (vertebrae) that make up the spine. The discs act as shock absorbers for the spine, so it can flex, bend, and twist.  Degenerative disc disease can take place in one or more places along the spine. It most often occurs in the discs in the lower back and the neck. What causes it? As we age, our spinal discs break down, or degenerate. This breakdown causes the symptoms of degenerative disc disease in some people. When the discs break down, they can lose fluid and dry out, and their outer layers can have tiny cracks or tears. This leads to less padding and less space between the bones in the spine. The body reacts to this by making bony growths on the spine called bone spurs. These spurs can press on the spinal nerve roots or spinal cord. This can cause pain and can affect how well the nerves work. What are the symptoms? Many people with degenerative disc disease have no pain. But others have severe pain or other symptoms that limit their activities. Some of the most common symptoms are:  · Pain in the back or neck. Where the pain occurs depends on which discs are affected. · Pain that gets worse when you move, such as bending over, reaching up, or twisting. · Pain that may occur in the rear end (buttocks), arm, or leg if a nerve is pinched. · Numbness or tingling in your arm or leg. How is it diagnosed?   A doctor can often diagnose degenerative disc disease while doing a physical exam. If your exam shows no signs of a serious condition, imaging tests (such as an X-ray) aren't likely to help your doctor find the cause of your symptoms. Sometimes degenerative disc disease is found when an X-ray is taken for another reason, such as an injury or other health problem. But even if the doctor finds degenerative disc disease, that doesn't always mean that you will have symptoms. How is it treated? There are several things you can do at home to manage pain from this problem. · To relieve pain, use ice or heat (whichever feels better) on the affected area. ¨ Put ice or a cold pack on the area for 10 to 20 minutes at a time. Put a thin cloth between the ice and your skin. ¨ Put a warm water bottle, a heating pad set on low, or a warm cloth on your back. Put a thin cloth between the heating pad and your skin. Do not go to sleep with a heating pad on your skin. · Ask your doctor if you can take acetaminophen (such as Tylenol) or nonsteroidal anti-inflammatory drugs, such as ibuprofen or naproxen. Your doctor can prescribe stronger medicines if needed. Be safe with medicines. Read and follow all instructions on the label. · Get some exercise every day. Exercise is one of the best ways to help your back feel better and stay better. It's best to start each exercise slowly. You may notice a little soreness, and that's okay. But if an exercise makes your pain worse, stop doing it. Here are things you can try:  ¨ Walking. It's the simplest and maybe the best activity for your back. It gets your blood moving and helps your muscles stay strong. ¨ Exercises that gently stretch and strengthen your stomach, back, and leg muscles. The stronger those muscles are, the better they're able to protect your back. If you have constant or severe pain in your back or spine, you may need other treatments, such as physical therapy. In some cases, your doctor may suggest surgery. Follow-up care is a key part of your treatment and safety.  Be sure to make and go to all appointments, and call your doctor if you are having problems. It's also a good idea to know your test results and keep a list of the medicines you take. Where can you learn more? Go to http://lin-blair.info/. Enter A574 in the search box to learn more about \"Learning About Degenerative Disc Disease. \"  Current as of: March 21, 2017  Content Version: 11.5  © 8243-1462 [employer]. Care instructions adapted under license by [employer] (which disclaims liability or warranty for this information). If you have questions about a medical condition or this instruction, always ask your healthcare professional. [place] [address] any warranty or liability for your use of this information.

## 2019-03-27 NOTE — ED NOTES
Assumed care from triage. Patient comes the ED complaining of middle lower back pain that started a few days ago and radiates sometimes up the patients back. Patient state she has a hx of back pain.
Patient medicated per orders.
I will START or STAY ON the medications listed below when I get home from the hospital:    acetaminophen 325 mg oral tablet  -- 3 tab(s) by mouth every 8 hours  -- Indication: For Knee pain, right    lacosamide 150 mg oral tablet  -- 1 tab(s) by mouth   -- Indication: For Cerebral Seizure    lacosamide 100 mg oral tablet  -- 1 tab(s) by mouth   -- Indication: For Cerebral Seizure    topiramate 200 mg oral tablet  -- 1 tab(s) by mouth every 12 hours  -- Indication: For Cerebral Seizure    atorvastatin 20 mg oral tablet  -- 1 tab(s) by mouth once a day (at bedtime)  -- Indication: For High cholesterol    Abilify 5 mg oral tablet  -- 1 tab(s) by mouth once a day  -- Indication: For anxiety    lidocaine 4% topical film  -- Apply on skin to affected area every 12 hours  -- Indication: For Knee pain, right    senna oral tablet  -- 2 tab(s) by mouth once a day (at bedtime)  -- Indication: For Constipation    bisacodyl 5 mg oral delayed release tablet  -- 1 tab(s) by mouth once a day (at bedtime)  -- Indication: For Constipation    docusate sodium 100 mg oral capsule  -- 1 cap(s) by mouth once a day  -- Indication: For Constipation    polyethylene glycol 3350 oral powder for reconstitution  -- 17 gram(s) by mouth once a day  -- Indication: For Constipation    baclofen 10 mg oral tablet  -- 1 tab(s) by mouth 2 times a day  -- Indication: For Falls    omeprazole 20 mg oral delayed release capsule  -- 1 cap(s) by mouth once a day  -- Indication: For reflux

## 2019-03-27 NOTE — LETTER
NOTIFICATION RETURN TO WORK / SCHOOL 
 
3/27/2019 9:06 AM 
 
Ms. Jenn Benjamin [address] [id number] To Whom It May Concern: 
 
Jenn Benjamin is currently under the care of [facility] EMERGENCY DEPT. She will return to work/school on: 3/30/19 If there are questions or concerns please have the patient contact our office. Sincerely, Sundeep Glasgow PA-C

## 2019-03-28 LAB
BACTERIA SPEC CULT: NORMAL
CC UR VC: NORMAL
SERVICE CMNT-IMP: NORMAL

## 2022-03-19 PROBLEM — G93.2 INTRACRANIAL HYPERTENSION, BENIGN: Status: ACTIVE | Noted: 2017-10-26

## 2022-03-19 PROBLEM — O36.4XX0 IUFD AT 20 WEEKS OR MORE OF GESTATION: Status: ACTIVE | Noted: 2017-10-26

## 2022-03-19 PROBLEM — O35.02X0: Status: ACTIVE | Noted: 2017-10-26

## 2022-03-20 PROBLEM — Z98.2 INTRACRANIAL SHUNT: Status: ACTIVE | Noted: 2017-10-26

## 2025-05-07 ENCOUNTER — APPOINTMENT (OUTPATIENT)
Facility: HOSPITAL | Age: 45
DRG: 179 | End: 2025-05-07
Payer: MEDICAID

## 2025-05-07 ENCOUNTER — HOSPITAL ENCOUNTER (INPATIENT)
Facility: HOSPITAL | Age: 45
LOS: 18 days | Discharge: HOME | DRG: 179 | End: 2025-05-25
Attending: EMERGENCY MEDICINE | Admitting: STUDENT IN AN ORGANIZED HEALTH CARE EDUCATION/TRAINING PROGRAM
Payer: MEDICAID

## 2025-05-07 DIAGNOSIS — I47.21 TORSADES DE POINTES (HCC): ICD-10-CM

## 2025-05-07 DIAGNOSIS — Z98.2 INTRACRANIAL SHUNT: ICD-10-CM

## 2025-05-07 DIAGNOSIS — I49.9 ABNORMAL HEART RHYTHM: ICD-10-CM

## 2025-05-07 DIAGNOSIS — I24.9 ACUTE CORONARY SYNDROME (HCC): ICD-10-CM

## 2025-05-07 DIAGNOSIS — R94.31 ABNORMAL EKG: ICD-10-CM

## 2025-05-07 DIAGNOSIS — R11.10 INTRACTABLE VOMITING: Primary | ICD-10-CM

## 2025-05-07 LAB
ALBUMIN SERPL-MCNC: 3.8 G/DL (ref 3.5–5)
ALBUMIN/GLOB SERPL: 1.2 (ref 1.1–2.2)
ALP SERPL-CCNC: 79 U/L (ref 45–117)
ALT SERPL-CCNC: 22 U/L (ref 12–78)
AMPHET UR QL SCN: NEGATIVE
ANION GAP SERPL CALC-SCNC: 10 MMOL/L (ref 2–12)
APPEARANCE UR: CLEAR
AST SERPL-CCNC: 20 U/L (ref 15–37)
BACTERIA URNS QL MICRO: ABNORMAL /HPF
BARBITURATES UR QL SCN: NEGATIVE
BASOPHILS # BLD: 0.04 K/UL (ref 0–0.1)
BASOPHILS NFR BLD: 0.3 % (ref 0–1)
BENZODIAZ UR QL: NEGATIVE
BILIRUB SERPL-MCNC: 1.1 MG/DL (ref 0.2–1)
BILIRUB UR QL: NEGATIVE
BUN SERPL-MCNC: 10 MG/DL (ref 6–20)
BUN/CREAT SERPL: 10 (ref 12–20)
CALCIUM SERPL-MCNC: 9.4 MG/DL (ref 8.5–10.1)
CANNABINOIDS UR QL SCN: POSITIVE
CHLORIDE SERPL-SCNC: 110 MMOL/L (ref 97–108)
CO2 SERPL-SCNC: 21 MMOL/L (ref 21–32)
COCAINE UR QL SCN: NEGATIVE
COLOR UR: ABNORMAL
CREAT SERPL-MCNC: 1 MG/DL (ref 0.55–1.02)
DIFFERENTIAL METHOD BLD: ABNORMAL
EKG ATRIAL RATE: 50 BPM
EKG DIAGNOSIS: NORMAL
EKG P AXIS: 39 DEGREES
EKG P-R INTERVAL: 182 MS
EKG Q-T INTERVAL: 536 MS
EKG QRS DURATION: 78 MS
EKG QTC CALCULATION (BAZETT): 488 MS
EKG R AXIS: 18 DEGREES
EKG T AXIS: -7 DEGREES
EKG VENTRICULAR RATE: 50 BPM
EOSINOPHIL # BLD: 0.05 K/UL (ref 0–0.4)
EOSINOPHIL NFR BLD: 0.3 % (ref 0–7)
EPITH CASTS URNS QL MICRO: ABNORMAL /LPF
ERYTHROCYTE [DISTWIDTH] IN BLOOD BY AUTOMATED COUNT: 14 % (ref 11.5–14.5)
GLOBULIN SER CALC-MCNC: 3.3 G/DL (ref 2–4)
GLUCOSE SERPL-MCNC: 84 MG/DL (ref 65–100)
GLUCOSE UR STRIP.AUTO-MCNC: NEGATIVE MG/DL
HCG SERPL QL: NEGATIVE
HCT VFR BLD AUTO: 50.6 % (ref 35–47)
HGB BLD-MCNC: 16.2 G/DL (ref 11.5–16)
HGB UR QL STRIP: ABNORMAL
HYALINE CASTS URNS QL MICRO: ABNORMAL /LPF (ref 0–2)
IMM GRANULOCYTES # BLD AUTO: 0.08 K/UL (ref 0–0.04)
IMM GRANULOCYTES NFR BLD AUTO: 0.5 % (ref 0–0.5)
KETONES UR QL STRIP.AUTO: >80 MG/DL
LEUKOCYTE ESTERASE UR QL STRIP.AUTO: NEGATIVE
LYMPHOCYTES # BLD: 2.41 K/UL (ref 0.8–3.5)
LYMPHOCYTES NFR BLD: 15.1 % (ref 12–49)
Lab: ABNORMAL
MAGNESIUM SERPL-MCNC: 2.6 MG/DL (ref 1.6–2.4)
MCH RBC QN AUTO: 33.6 PG (ref 26–34)
MCHC RBC AUTO-ENTMCNC: 32 G/DL (ref 30–36.5)
MCV RBC AUTO: 105 FL (ref 80–99)
METHADONE UR QL: NEGATIVE
MONOCYTES # BLD: 0.7 K/UL (ref 0–1)
MONOCYTES NFR BLD: 4.4 % (ref 5–13)
NEUTS SEG # BLD: 12.67 K/UL (ref 1.8–8)
NEUTS SEG NFR BLD: 79.4 % (ref 32–75)
NITRITE UR QL STRIP.AUTO: NEGATIVE
NRBC # BLD: 0 K/UL (ref 0–0.01)
NRBC BLD-RTO: 0 PER 100 WBC
OPIATES UR QL: NEGATIVE
PCP UR QL: NEGATIVE
PH UR STRIP: 5.5 (ref 5–8)
PLATELET # BLD AUTO: 412 K/UL (ref 150–400)
PMV BLD AUTO: 9.9 FL (ref 8.9–12.9)
POTASSIUM SERPL-SCNC: 3.4 MMOL/L (ref 3.5–5.1)
PROT SERPL-MCNC: 7.1 G/DL (ref 6.4–8.2)
PROT UR STRIP-MCNC: ABNORMAL MG/DL
RBC # BLD AUTO: 4.82 M/UL (ref 3.8–5.2)
RBC #/AREA URNS HPF: ABNORMAL /HPF (ref 0–5)
SODIUM SERPL-SCNC: 141 MMOL/L (ref 136–145)
SP GR UR REFRACTOMETRY: >1.03 (ref 1–1.03)
TROPONIN I SERPL HS-MCNC: 7 NG/L (ref 0–51)
TROPONIN I SERPL HS-MCNC: 7 NG/L (ref 0–51)
URINE CULTURE IF INDICATED: ABNORMAL
UROBILINOGEN UR QL STRIP.AUTO: 1 EU/DL (ref 0.2–1)
WBC # BLD AUTO: 16 K/UL (ref 3.6–11)
WBC URNS QL MICRO: ABNORMAL /HPF (ref 0–4)

## 2025-05-07 PROCEDURE — 85025 COMPLETE CBC W/AUTO DIFF WBC: CPT

## 2025-05-07 PROCEDURE — 96361 HYDRATE IV INFUSION ADD-ON: CPT

## 2025-05-07 PROCEDURE — 93005 ELECTROCARDIOGRAM TRACING: CPT | Performed by: EMERGENCY MEDICINE

## 2025-05-07 PROCEDURE — 80053 COMPREHEN METABOLIC PANEL: CPT

## 2025-05-07 PROCEDURE — 2580000003 HC RX 258

## 2025-05-07 PROCEDURE — 6360000002 HC RX W HCPCS: Performed by: STUDENT IN AN ORGANIZED HEALTH CARE EDUCATION/TRAINING PROGRAM

## 2025-05-07 PROCEDURE — 96367 TX/PROPH/DG ADDL SEQ IV INF: CPT

## 2025-05-07 PROCEDURE — 70450 CT HEAD/BRAIN W/O DYE: CPT

## 2025-05-07 PROCEDURE — 83735 ASSAY OF MAGNESIUM: CPT

## 2025-05-07 PROCEDURE — 6360000002 HC RX W HCPCS

## 2025-05-07 PROCEDURE — 83605 ASSAY OF LACTIC ACID: CPT

## 2025-05-07 PROCEDURE — 36415 COLL VENOUS BLD VENIPUNCTURE: CPT

## 2025-05-07 PROCEDURE — 84484 ASSAY OF TROPONIN QUANT: CPT

## 2025-05-07 PROCEDURE — 1100000000 HC RM PRIVATE

## 2025-05-07 PROCEDURE — 96365 THER/PROPH/DIAG IV INF INIT: CPT

## 2025-05-07 PROCEDURE — 74177 CT ABD & PELVIS W/CONTRAST: CPT

## 2025-05-07 PROCEDURE — 84703 CHORIONIC GONADOTROPIN ASSAY: CPT

## 2025-05-07 PROCEDURE — 99285 EMERGENCY DEPT VISIT HI MDM: CPT

## 2025-05-07 PROCEDURE — 80307 DRUG TEST PRSMV CHEM ANLYZR: CPT

## 2025-05-07 PROCEDURE — 6360000004 HC RX CONTRAST MEDICATION: Performed by: EMERGENCY MEDICINE

## 2025-05-07 PROCEDURE — 96375 TX/PRO/DX INJ NEW DRUG ADDON: CPT

## 2025-05-07 PROCEDURE — 96376 TX/PRO/DX INJ SAME DRUG ADON: CPT

## 2025-05-07 PROCEDURE — 81001 URINALYSIS AUTO W/SCOPE: CPT

## 2025-05-07 PROCEDURE — 80047 BASIC METABLC PNL IONIZED CA: CPT

## 2025-05-07 PROCEDURE — 2580000003 HC RX 258: Performed by: STUDENT IN AN ORGANIZED HEALTH CARE EDUCATION/TRAINING PROGRAM

## 2025-05-07 RX ORDER — POLYETHYLENE GLYCOL 3350 17 G/17G
17 POWDER, FOR SOLUTION ORAL DAILY PRN
Status: DISCONTINUED | OUTPATIENT
Start: 2025-05-07 | End: 2025-05-25 | Stop reason: HOSPADM

## 2025-05-07 RX ORDER — ACETAMINOPHEN 650 MG/1
650 SUPPOSITORY RECTAL EVERY 6 HOURS PRN
Status: DISCONTINUED | OUTPATIENT
Start: 2025-05-07 | End: 2025-05-25 | Stop reason: HOSPADM

## 2025-05-07 RX ORDER — METOCLOPRAMIDE HYDROCHLORIDE 5 MG/ML
10 INJECTION INTRAMUSCULAR; INTRAVENOUS
Status: COMPLETED | OUTPATIENT
Start: 2025-05-07 | End: 2025-05-07

## 2025-05-07 RX ORDER — PANTOPRAZOLE SODIUM 40 MG/1
40 TABLET, DELAYED RELEASE ORAL DAILY
COMMUNITY

## 2025-05-07 RX ORDER — MIDAZOLAM HYDROCHLORIDE 5 MG/5ML
2.5 INJECTION, SOLUTION INTRAMUSCULAR; INTRAVENOUS ONCE
Status: COMPLETED | OUTPATIENT
Start: 2025-05-07 | End: 2025-05-07

## 2025-05-07 RX ORDER — HYDROXYZINE HYDROCHLORIDE 50 MG/1
50 TABLET, FILM COATED ORAL EVERY 6 HOURS PRN
Status: DISCONTINUED | OUTPATIENT
Start: 2025-05-07 | End: 2025-05-10

## 2025-05-07 RX ORDER — MULTIVITAMIN WITH IRON
1 TABLET ORAL DAILY
COMMUNITY

## 2025-05-07 RX ORDER — MAGNESIUM SULFATE IN WATER 40 MG/ML
2000 INJECTION, SOLUTION INTRAVENOUS
Status: DISCONTINUED | OUTPATIENT
Start: 2025-05-07 | End: 2025-05-07

## 2025-05-07 RX ORDER — DIAZEPAM 10 MG/2ML
2 INJECTION, SOLUTION INTRAMUSCULAR; INTRAVENOUS ONCE
Status: COMPLETED | OUTPATIENT
Start: 2025-05-07 | End: 2025-05-07

## 2025-05-07 RX ORDER — MAGNESIUM SULFATE IN WATER 40 MG/ML
2000 INJECTION, SOLUTION INTRAVENOUS
Status: COMPLETED | OUTPATIENT
Start: 2025-05-07 | End: 2025-05-07

## 2025-05-07 RX ORDER — POTASSIUM CHLORIDE 7.45 MG/ML
10 INJECTION INTRAVENOUS PRN
Status: DISCONTINUED | OUTPATIENT
Start: 2025-05-07 | End: 2025-05-11

## 2025-05-07 RX ORDER — IOPAMIDOL 755 MG/ML
100 INJECTION, SOLUTION INTRAVASCULAR
Status: COMPLETED | OUTPATIENT
Start: 2025-05-07 | End: 2025-05-07

## 2025-05-07 RX ORDER — HYDROXYZINE HYDROCHLORIDE 50 MG/1
50 TABLET, FILM COATED ORAL EVERY 6 HOURS PRN
Status: ON HOLD | COMMUNITY
End: 2025-05-25 | Stop reason: HOSPADM

## 2025-05-07 RX ORDER — URSODIOL 300 MG/1
250 CAPSULE ORAL 2 TIMES DAILY
COMMUNITY

## 2025-05-07 RX ORDER — SODIUM CHLORIDE 0.9 % (FLUSH) 0.9 %
5-40 SYRINGE (ML) INJECTION EVERY 12 HOURS SCHEDULED
Status: DISCONTINUED | OUTPATIENT
Start: 2025-05-07 | End: 2025-05-10 | Stop reason: SDUPTHER

## 2025-05-07 RX ORDER — POTASSIUM CHLORIDE 1500 MG/1
40 TABLET, EXTENDED RELEASE ORAL PRN
Status: DISCONTINUED | OUTPATIENT
Start: 2025-05-07 | End: 2025-05-11

## 2025-05-07 RX ORDER — ONDANSETRON 4 MG/1
4 TABLET, ORALLY DISINTEGRATING ORAL EVERY 8 HOURS PRN
Status: DISCONTINUED | OUTPATIENT
Start: 2025-05-07 | End: 2025-05-10

## 2025-05-07 RX ORDER — SODIUM CHLORIDE 0.9 % (FLUSH) 0.9 %
5-40 SYRINGE (ML) INJECTION PRN
Status: DISCONTINUED | OUTPATIENT
Start: 2025-05-07 | End: 2025-05-10 | Stop reason: SDUPTHER

## 2025-05-07 RX ORDER — HALOPERIDOL 5 MG/ML
2 INJECTION INTRAMUSCULAR
Status: COMPLETED | OUTPATIENT
Start: 2025-05-07 | End: 2025-05-07

## 2025-05-07 RX ORDER — ONDANSETRON 2 MG/ML
4 INJECTION INTRAMUSCULAR; INTRAVENOUS EVERY 6 HOURS PRN
Status: DISCONTINUED | OUTPATIENT
Start: 2025-05-07 | End: 2025-05-10

## 2025-05-07 RX ORDER — PANTOPRAZOLE SODIUM 40 MG/1
40 TABLET, DELAYED RELEASE ORAL
Status: DISCONTINUED | OUTPATIENT
Start: 2025-05-08 | End: 2025-05-11

## 2025-05-07 RX ORDER — MAGNESIUM SULFATE IN WATER 40 MG/ML
2000 INJECTION, SOLUTION INTRAVENOUS PRN
Status: DISCONTINUED | OUTPATIENT
Start: 2025-05-07 | End: 2025-05-11

## 2025-05-07 RX ORDER — DEXTROSE MONOHYDRATE, SODIUM CHLORIDE, AND POTASSIUM CHLORIDE 50; 1.49; 4.5 G/1000ML; G/1000ML; G/1000ML
INJECTION, SOLUTION INTRAVENOUS CONTINUOUS
Status: DISCONTINUED | OUTPATIENT
Start: 2025-05-07 | End: 2025-05-08

## 2025-05-07 RX ORDER — 0.9 % SODIUM CHLORIDE 0.9 %
1000 INTRAVENOUS SOLUTION INTRAVENOUS
Status: COMPLETED | OUTPATIENT
Start: 2025-05-07 | End: 2025-05-07

## 2025-05-07 RX ORDER — URSODIOL 300 MG/1
300 CAPSULE ORAL 2 TIMES DAILY
Status: DISCONTINUED | OUTPATIENT
Start: 2025-05-07 | End: 2025-05-25 | Stop reason: HOSPADM

## 2025-05-07 RX ORDER — DIPHENHYDRAMINE HYDROCHLORIDE 50 MG/ML
25 INJECTION, SOLUTION INTRAMUSCULAR; INTRAVENOUS
Status: COMPLETED | OUTPATIENT
Start: 2025-05-07 | End: 2025-05-07

## 2025-05-07 RX ORDER — SODIUM CHLORIDE 9 MG/ML
INJECTION, SOLUTION INTRAVENOUS PRN
Status: DISCONTINUED | OUTPATIENT
Start: 2025-05-07 | End: 2025-05-25 | Stop reason: HOSPADM

## 2025-05-07 RX ORDER — ENOXAPARIN SODIUM 100 MG/ML
40 INJECTION SUBCUTANEOUS DAILY
Status: DISCONTINUED | OUTPATIENT
Start: 2025-05-08 | End: 2025-05-13

## 2025-05-07 RX ORDER — ACETAMINOPHEN 325 MG/1
650 TABLET ORAL EVERY 6 HOURS PRN
Status: DISCONTINUED | OUTPATIENT
Start: 2025-05-07 | End: 2025-05-25 | Stop reason: HOSPADM

## 2025-05-07 RX ORDER — POTASSIUM CHLORIDE 7.45 MG/ML
10 INJECTION INTRAVENOUS
Status: DISCONTINUED | OUTPATIENT
Start: 2025-05-07 | End: 2025-05-07

## 2025-05-07 RX ORDER — SODIUM CHLORIDE 9 MG/ML
INJECTION, SOLUTION INTRAVENOUS CONTINUOUS
Status: DISCONTINUED | OUTPATIENT
Start: 2025-05-07 | End: 2025-05-07

## 2025-05-07 RX ADMIN — POTASSIUM CHLORIDE 10 MEQ: 7.46 INJECTION, SOLUTION INTRAVENOUS at 19:23

## 2025-05-07 RX ADMIN — SODIUM CHLORIDE 1000 ML: 0.9 INJECTION, SOLUTION INTRAVENOUS at 18:27

## 2025-05-07 RX ADMIN — DIAZEPAM 2 MG: 5 INJECTION, SOLUTION INTRAMUSCULAR; INTRAVENOUS at 19:46

## 2025-05-07 RX ADMIN — MIDAZOLAM 2.5 MG: 1 INJECTION INTRAMUSCULAR; INTRAVENOUS at 20:53

## 2025-05-07 RX ADMIN — METOCLOPRAMIDE 10 MG: 5 INJECTION, SOLUTION INTRAMUSCULAR; INTRAVENOUS at 19:11

## 2025-05-07 RX ADMIN — DIAZEPAM 2 MG: 5 INJECTION, SOLUTION INTRAMUSCULAR; INTRAVENOUS at 19:21

## 2025-05-07 RX ADMIN — POTASSIUM CHLORIDE 10 MEQ: 7.46 INJECTION, SOLUTION INTRAVENOUS at 21:26

## 2025-05-07 RX ADMIN — DIPHENHYDRAMINE HYDROCHLORIDE 25 MG: 50 INJECTION INTRAMUSCULAR; INTRAVENOUS at 18:27

## 2025-05-07 RX ADMIN — FAMOTIDINE 20 MG: 10 INJECTION, SOLUTION INTRAVENOUS at 22:07

## 2025-05-07 RX ADMIN — IOPAMIDOL 100 ML: 755 INJECTION, SOLUTION INTRAVENOUS at 20:05

## 2025-05-07 RX ADMIN — HALOPERIDOL LACTATE 2 MG: 5 INJECTION, SOLUTION INTRAMUSCULAR at 18:27

## 2025-05-07 RX ADMIN — MAGNESIUM SULFATE HEPTAHYDRATE 2000 MG: 40 INJECTION, SOLUTION INTRAVENOUS at 19:15

## 2025-05-07 ASSESSMENT — PAIN DESCRIPTION - PAIN TYPE: TYPE: ACUTE PAIN

## 2025-05-07 ASSESSMENT — LIFESTYLE VARIABLES
HOW MANY STANDARD DRINKS CONTAINING ALCOHOL DO YOU HAVE ON A TYPICAL DAY: PATIENT DOES NOT DRINK
HOW OFTEN DO YOU HAVE A DRINK CONTAINING ALCOHOL: NEVER

## 2025-05-07 ASSESSMENT — PAIN DESCRIPTION - ORIENTATION: ORIENTATION: MID

## 2025-05-07 ASSESSMENT — PAIN SCALES - GENERAL: PAINLEVEL_OUTOF10: 8

## 2025-05-07 ASSESSMENT — PAIN DESCRIPTION - DESCRIPTORS: DESCRIPTORS: ACHING

## 2025-05-07 ASSESSMENT — PAIN DESCRIPTION - LOCATION: LOCATION: ABDOMEN

## 2025-05-07 NOTE — ED PROVIDER NOTES
[facility] EMERGENCY DEPT  EMERGENCY DEPARTMENT HISTORY AND PHYSICAL EXAM      Date of evaluation: 5/7/2025  Patient Name: Ruma Boyd  Birthdate 1980  MRN: [id number]  ED Provider: KECIA Meraz NP   Note Started: 7:48 PM EDT 5/7/25    HISTORY OF PRESENT ILLNESS     Chief Complaint   Patient presents with    Vomiting     Patient BIBEMS from home for N/V since 1100. Pt reports hx gastric sleeve surgery in December. Patient also endorses middle abdominal pain.        History Provided By: Patient, only     HPI: Ruma Boyd is a 45 y.o. female with past medical history of intracranial hypertension with a  shunt; gastric sleeve and depression, presents to the emergency department via EMS with complaints of nausea vomiting and abdominal pain that started this morning.  Patient states her symptoms have been constant and she has been unable to tolerate p.o.  She does report smoking marijuana 2 days ago, but states this is not a regular thing for her.  Denies chest pain, difficulty breathing, diarrhea, urinary symptoms.  Upon arrival, patient alert and interacting appropriately but violently vomiting/retching.  4 mg Zofran IV given by EMS prior to arrival; no relief with this medication.    PAST MEDICAL HISTORY   Past Medical History:  No past medical history on file.    Past Surgical History:  No past surgical history on file.    Family History:  No family history on file.    Social History:       Allergies:  Allergies   Allergen Reactions    Naproxen     Tramadol        PCP: Heaven Ballard MD    Current Meds:   Current Facility-Administered Medications   Medication Dose Route Frequency Provider Last Rate Last Admin    potassium chloride 10 mEq/100 mL IVPB (Peripheral Line)  10 mEq IntraVENous Q1H Gricelda Carbajal APRN -  mL/hr at 05/07/25 1923 10 mEq at 05/07/25 1923    0.9 % sodium chloride infusion   IntraVENous Continuous Gricelda Carbajal APRN - NP         No current outpatient medications on file.

## 2025-05-08 LAB
ANION GAP BLD CALC-SCNC: ABNORMAL (ref 10–20)
ANION GAP SERPL CALC-SCNC: 14 MMOL/L (ref 2–12)
BASOPHILS # BLD: 0.04 K/UL (ref 0–0.1)
BASOPHILS NFR BLD: 0.2 % (ref 0–1)
BUN SERPL-MCNC: 6 MG/DL (ref 6–20)
BUN/CREAT SERPL: 7 (ref 12–20)
CA-I BLD-MCNC: 0.99 MMOL/L (ref 1.15–1.33)
CALCIUM SERPL-MCNC: 8.8 MG/DL (ref 8.5–10.1)
CHLORIDE BLD-SCNC: 114 MMOL/L (ref 100–111)
CHLORIDE SERPL-SCNC: 110 MMOL/L (ref 97–108)
CO2 SERPL-SCNC: 14 MMOL/L (ref 21–32)
CREAT SERPL-MCNC: 0.92 MG/DL (ref 0.55–1.02)
CREAT UR-MCNC: 0.8 MG/DL (ref 0.6–1.3)
DIFFERENTIAL METHOD BLD: ABNORMAL
EOSINOPHIL # BLD: 0 K/UL (ref 0–0.4)
EOSINOPHIL NFR BLD: 0 % (ref 0–7)
ERYTHROCYTE [DISTWIDTH] IN BLOOD BY AUTOMATED COUNT: 14 % (ref 11.5–14.5)
ETHANOL SERPL-MCNC: <10 MG/DL (ref 0–0.08)
GLUCOSE BLD STRIP.AUTO-MCNC: 154 MG/DL (ref 74–99)
GLUCOSE SERPL-MCNC: 140 MG/DL (ref 65–100)
HCT VFR BLD AUTO: 50.8 % (ref 35–47)
HGB BLD-MCNC: 16.1 G/DL (ref 11.5–16)
IMM GRANULOCYTES # BLD AUTO: 0.11 K/UL (ref 0–0.04)
IMM GRANULOCYTES NFR BLD AUTO: 0.6 % (ref 0–0.5)
LYMPHOCYTES # BLD: 1.17 K/UL (ref 0.8–3.5)
LYMPHOCYTES NFR BLD: 6.6 % (ref 12–49)
MAGNESIUM SERPL-MCNC: 2.5 MG/DL (ref 1.6–2.4)
MCH RBC QN AUTO: 33.3 PG (ref 26–34)
MCHC RBC AUTO-ENTMCNC: 31.7 G/DL (ref 30–36.5)
MCV RBC AUTO: 105 FL (ref 80–99)
MONOCYTES # BLD: 0.56 K/UL (ref 0–1)
MONOCYTES NFR BLD: 3.1 % (ref 5–13)
NEUTS SEG # BLD: 15.95 K/UL (ref 1.8–8)
NEUTS SEG NFR BLD: 89.5 % (ref 32–75)
NRBC # BLD: 0 K/UL (ref 0–0.01)
NRBC BLD-RTO: 0 PER 100 WBC
PLATELET # BLD AUTO: 378 K/UL (ref 150–400)
PMV BLD AUTO: 10.2 FL (ref 8.9–12.9)
POTASSIUM BLD-SCNC: 2.7 MMOL/L (ref 3.5–5.5)
POTASSIUM SERPL-SCNC: 4.1 MMOL/L (ref 3.5–5.1)
RBC # BLD AUTO: 4.84 M/UL (ref 3.8–5.2)
SERVICE CMNT-IMP: ABNORMAL
SODIUM BLD-SCNC: 146 MMOL/L (ref 136–145)
SODIUM SERPL-SCNC: 138 MMOL/L (ref 136–145)
SPECIMEN SITE: ABNORMAL
WBC # BLD AUTO: 17.8 K/UL (ref 3.6–11)

## 2025-05-08 PROCEDURE — 82077 ASSAY SPEC XCP UR&BREATH IA: CPT

## 2025-05-08 PROCEDURE — 2580000003 HC RX 258: Performed by: INTERNAL MEDICINE

## 2025-05-08 PROCEDURE — 83735 ASSAY OF MAGNESIUM: CPT

## 2025-05-08 PROCEDURE — 6370000000 HC RX 637 (ALT 250 FOR IP): Performed by: NURSE PRACTITIONER

## 2025-05-08 PROCEDURE — 6360000002 HC RX W HCPCS: Performed by: STUDENT IN AN ORGANIZED HEALTH CARE EDUCATION/TRAINING PROGRAM

## 2025-05-08 PROCEDURE — 2580000003 HC RX 258: Performed by: STUDENT IN AN ORGANIZED HEALTH CARE EDUCATION/TRAINING PROGRAM

## 2025-05-08 PROCEDURE — 6360000002 HC RX W HCPCS: Performed by: INTERNAL MEDICINE

## 2025-05-08 PROCEDURE — 2060000000 HC ICU INTERMEDIATE R&B

## 2025-05-08 PROCEDURE — 36415 COLL VENOUS BLD VENIPUNCTURE: CPT

## 2025-05-08 PROCEDURE — 2700000000 HC OXYGEN THERAPY PER DAY

## 2025-05-08 PROCEDURE — 85025 COMPLETE CBC W/AUTO DIFF WBC: CPT

## 2025-05-08 PROCEDURE — 2500000003 HC RX 250 WO HCPCS: Performed by: INTERNAL MEDICINE

## 2025-05-08 PROCEDURE — 80048 BASIC METABOLIC PNL TOTAL CA: CPT

## 2025-05-08 PROCEDURE — 2500000003 HC RX 250 WO HCPCS: Performed by: STUDENT IN AN ORGANIZED HEALTH CARE EDUCATION/TRAINING PROGRAM

## 2025-05-08 RX ORDER — PROCHLORPERAZINE EDISYLATE 5 MG/ML
10 INJECTION INTRAMUSCULAR; INTRAVENOUS ONCE
Status: DISCONTINUED | OUTPATIENT
Start: 2025-05-08 | End: 2025-05-08

## 2025-05-08 RX ORDER — AMLODIPINE BESYLATE 5 MG/1
5 TABLET ORAL DAILY
Status: DISCONTINUED | OUTPATIENT
Start: 2025-05-08 | End: 2025-05-25 | Stop reason: HOSPADM

## 2025-05-08 RX ORDER — DIPHENHYDRAMINE HYDROCHLORIDE 50 MG/ML
25 INJECTION, SOLUTION INTRAMUSCULAR; INTRAVENOUS ONCE
Status: COMPLETED | OUTPATIENT
Start: 2025-05-08 | End: 2025-05-08

## 2025-05-08 RX ORDER — SCOPOLAMINE 1 MG/3D
1 PATCH, EXTENDED RELEASE TRANSDERMAL ONCE
Status: DISCONTINUED | OUTPATIENT
Start: 2025-05-08 | End: 2025-05-10

## 2025-05-08 RX ORDER — HYDRALAZINE HYDROCHLORIDE 20 MG/ML
10 INJECTION INTRAMUSCULAR; INTRAVENOUS EVERY 6 HOURS PRN
Status: DISCONTINUED | OUTPATIENT
Start: 2025-05-08 | End: 2025-05-22 | Stop reason: HOSPADM

## 2025-05-08 RX ADMIN — ONDANSETRON 4 MG: 2 INJECTION, SOLUTION INTRAMUSCULAR; INTRAVENOUS at 04:11

## 2025-05-08 RX ADMIN — SODIUM CHLORIDE, PRESERVATIVE FREE 40 MG: 5 INJECTION INTRAVENOUS at 17:03

## 2025-05-08 RX ADMIN — ONDANSETRON 4 MG: 2 INJECTION, SOLUTION INTRAMUSCULAR; INTRAVENOUS at 09:19

## 2025-05-08 RX ADMIN — SODIUM BICARBONATE: 84 INJECTION, SOLUTION INTRAVENOUS at 11:21

## 2025-05-08 RX ADMIN — POTASSIUM CHLORIDE, DEXTROSE MONOHYDRATE AND SODIUM CHLORIDE: 150; 5; 450 INJECTION, SOLUTION INTRAVENOUS at 01:41

## 2025-05-08 RX ADMIN — ENOXAPARIN SODIUM 40 MG: 100 INJECTION SUBCUTANEOUS at 09:15

## 2025-05-08 RX ADMIN — DIPHENHYDRAMINE HYDROCHLORIDE 25 MG: 50 INJECTION INTRAMUSCULAR; INTRAVENOUS at 18:15

## 2025-05-08 RX ADMIN — PROMETHAZINE HYDROCHLORIDE 25 MG: 25 INJECTION INTRAMUSCULAR; INTRAVENOUS at 06:05

## 2025-05-08 RX ADMIN — SODIUM CHLORIDE, PRESERVATIVE FREE 40 MG: 5 INJECTION INTRAVENOUS at 09:15

## 2025-05-08 ASSESSMENT — PAIN DESCRIPTION - LOCATION
LOCATION: ABDOMEN

## 2025-05-08 ASSESSMENT — PAIN DESCRIPTION - PAIN TYPE
TYPE: ACUTE PAIN

## 2025-05-08 ASSESSMENT — PAIN DESCRIPTION - DESCRIPTORS
DESCRIPTORS: ACHING;SPASM

## 2025-05-08 ASSESSMENT — PAIN SCALES - GENERAL
PAINLEVEL_OUTOF10: 5
PAINLEVEL_OUTOF10: 3
PAINLEVEL_OUTOF10: 6

## 2025-05-08 ASSESSMENT — PAIN DESCRIPTION - ORIENTATION
ORIENTATION: MID
ORIENTATION: MID

## 2025-05-08 NOTE — CONSULTS
GI CONSULTATION NOTE  Debbie Perry PA-C  [phone] ADDY in-hospital cell phone M-F until 4:30  After 5pm or on weekends, please call  for physician on call    NAME: Ruma Boyd   :  1980   MRN:  [id number]   Attending:   Dr. De Luna  Date/Time:  2025 8:32 AM  Assessment:   N/V  Hx gastric sleeve  Hx of H.pylori  Hx of HH repair   Cannabinoid Hyperemesis Syndrome  Migraine disorder  Leukocytosis  Prolonged QTC  CMP wnl  THC positive on UA  WBC 17.8  Hgb 16.1    CT A/P 2025  No acute abdominal or pelvic pathology. Diverticulosis of the colon without acute inflammation.    EGD 2024 w/ Bravo  - LA Grade A reflux esophagitis with no bleeding.   - Gastroesophageal flap valve classified as Hill Grade I (prominent fold, tight to endoscope).   - Normal stomach. Biopsied.   - Normal first portion of the duodenum and second portion of the duodenum.   Composite DeMeester score: 34.6   BX unavailable but per [facility], H.pylori positive    Gastric biopsy taken at the time of sleeve gastrectomy 2024 showed no significant histologic abnormality per [facility]  Plan:   No plans for repeat EGD  Bravo indicated mild GERD, continue IV PPI BID while inpatient  Would avoid Zofran with prolonged QTC  Suspect N/V is due to a combination of gastroparesis, migraines, and cannabinoid hyperemesis syndrome. She is followed by [facility] GI for recurrent N/V s/p gastric sleeve, last seen 2025. Repeat H.pylor stool test after being off PPI >2 weeks as outpatient.   Supportive care  Nothing further to add for GI, we will sign off at this time    Plan discussed with Dr Giordano  Subjective:     HISTORY OF PRESENT ILLNESS:     Ruma Boyd is an 45 y.o.  female with a history of DM, HLD, obesity, Dx of gastric sleeve, Hx of HH repair, H.pylori who we were consulted for N/V. Patient seen and examined, sleeping in bed comfortably. She does not recall recent follow up at [facility] in 2025. She admits to

## 2025-05-08 NOTE — H&P
Hospitalist Admission Note    NAME:  Ruma Boyd   :  1980   MRN:  [id number]     Date/Time:  2025 10:38 PM    Patient PCP: Heaven Ballard MD    ______________________________________________________________________  Given the patient's current clinical presentation, I have a high level of concern for decompensation if discharged from the emergency department.  Complex decision making was performed, which includes reviewing the patient's available past medical records, laboratory results, and x-ray films.       My assessment of this patient's clinical condition and my plan of care is as follows.    Assessment / Plan:    Active Problems:  Arrhythmia  Prolonged Qtc  Intractable nausea and vomiting  History of gastric sleeve  Recent marijuana use  History of substance abuse  Idiopathic intracranial hypertension post  shunt  Migraine disorder  Psychiatric disorders: MDD, MAYRA, history intentional overdose, adjustment disorder  KAYLI on CPAP    Plan:  Arrhythmia  Prolonged Qtc  Admit to stepdown unit  Continuous telemetry  Cardiology consulted, greatly appreciate their expertise  2 g IV magnesium given in ED with improvement in QTc from 525 => 488  Avoid QT prolonging agents        Intractable nausea and vomiting  History of gastric sleeve  Recent marijuana use  History of substance abuse  As needed Zofran   - Hold further antiemetics in setting of prolonged QTc, arrhythmia, and oversedation  Increase Protonix to twice daily-IV/oral options available  Gastroenterology consulted, greatly appreciate their expertise  Counseled on abstinence from marijuana out of concern for cannabinoid hyperemesis  Check UDS and serum ethanol  Start maintenance fluids    Idiopathic intracranial hypertension post  shunt  Migraine disorder  Status post  shunt-CT head without evidence of complication  If symptoms persist despite supportive therapy consider neurology consultation    Psychiatric disorders: MDD, MAYRA,

## 2025-05-08 NOTE — ED NOTES
Attempted report x 3 to PCU charge RN and was told they are trying to find the nurse for report. Was told they will call back momentarily

## 2025-05-08 NOTE — ED NOTES
TRANSFER - OUT REPORT:    Verbal report given to Divine JORGE on Ruma Boyd  being transferred to 231 for routine progression of patient care       Report consisted of patient's Situation, Background, Assessment and   Recommendations(SBAR).     Information from the following report(s) Nurse Handoff Report, Index, ED Encounter Summary, ED SBAR, Adult Overview, MAR, and Recent Results was reviewed with the receiving nurse.    [place] Fall Assessment:    Presents to emergency department  because of falls (Syncope, seizure, or loss of consciousness): No  Age > 70: No  Altered Mental Status, Intoxication with alcohol or substance confusion (Disorientation, impaired judgment, poor safety awaremess, or inability to follow instructions): No  Impaired Mobility: Ambulates or transfers with assistive devices or assistance; Unable to ambulate or transer.: No  Nursing Judgement: No          Lines:   Peripheral IV 05/07/25 Left;Proximal Forearm (Active)       Peripheral IV 05/07/25 Left (Active)   Site Assessment Clean, dry & intact 05/07/25 1900   Line Status Blood return noted;Flushed;Specimen collected 05/07/25 1900   Line Care Connections checked and tightened;Chlorhexidine wipes;Line pulled back 05/07/25 1900   Phlebitis Assessment No symptoms 05/07/25 1900   Infiltration Assessment 0 05/07/25 1900   Alcohol Cap Used No 05/07/25 1900   Dressing Status Clean, dry & intact 05/07/25 1900   Dressing Type Transparent 05/07/25 1900   Dressing Intervention New 05/07/25 1900       Peripheral IV 05/07/25 Right Forearm (Active)       Peripheral IV 05/07/25 Right Antecubital (Active)   Site Assessment Clean, dry & intact 05/07/25 2143   Line Status Blood return noted;Flushed;Specimen collected 05/07/25 2143   Line Care Connections checked and tightened;Chlorhexidine wipes;Line pulled back 05/07/25 2143   Phlebitis Assessment No symptoms 05/07/25 2143   Infiltration Assessment 0 05/07/25 2143   Alcohol Cap Used No 05/07/25 2143

## 2025-05-08 NOTE — ED PROVIDER NOTES
Face-To-Face Shared Encounter with an ROMMEL's Patient:      The patient presents with N/V. Patient had frequent episodes of ectopy on monitor.    My exam shows 45 YOF, actively vomiting, appears uncomfortable.    Impression/Plan: 45-year-old female with a history of gastric sleeve and ICH status post shunt presents to the emergency department with a chief complaint of nausea and vomiting.    Patient admits to using marijuana on Monday.  No other drug use.    Patient given Zofran for EMS as well as Haldol.  I was asked to evaluate patient as patient had an episode of ectopy and nonsustained ventricular tachycardia on the monitor.    Patient had a repeat EKG performed which showed QTc prolongation, this raises concern for torsades.  Given 2 g of mag and code cart placed at bedside.  Will avoid QTc prolonging agents.  Potassium mildly low will replete.    Will CT head to rule out hydrocephalus.  CT abdomen pelvis.  Will medicate with benzodiazepines for nausea and vomiting.  Anticipate patient will require admission for observation hospital for intractable nausea vomiting as well as prolonged QTc.    Critical Care: I independently provided 10 minutes of non-concurrent critical care out of the total shared critical care time provided. This time excludes time spent in any separate billed procedures.  During this entire length of time I was immediately available to the patient .    I personally saw the patient and made/approved the management plan and take responsibility for the patient management.    For further details of Ruma Boyd's emergency department encounter, please see documentation by advanced practice provider, Gricelda Carbajal NP.    MD Vickey Vigil Matthew R, MD  05/09/25 [id number]

## 2025-05-08 NOTE — ED NOTES
Attempted report at this time and was told that receiving RN is in a room and will callback when they can.

## 2025-05-08 NOTE — PLAN OF CARE
Problem: Discharge Planning  Goal: Discharge to home or other facility with appropriate resources  Recent Flowsheet Documentation  Taken 5/8/2025 0900 by Leni Chatman RN  Discharge to home or other facility with appropriate resources:   Identify barriers to discharge with patient and caregiver   Arrange for needed discharge resources and transportation as appropriate   Identify discharge learning needs (meds, wound care, etc)  5/8/2025 0452 by Divine Romero, RN  Outcome: Progressing  Flowsheets (Taken 5/8/2025 0115)  Discharge to home or other facility with appropriate resources:   Identify barriers to discharge with patient and caregiver   Arrange for needed discharge resources and transportation as appropriate   Identify discharge learning needs (meds, wound care, etc)   Refer to discharge planning if patient needs post-hospital services based on physician order or complex needs related to functional status, cognitive ability or social support system     Problem: Pain  Goal: Verbalizes/displays adequate comfort level or baseline comfort level  5/8/2025 0452 by Divine Romero, RN  Outcome: Progressing

## 2025-05-09 LAB
ANION GAP SERPL CALC-SCNC: 10 MMOL/L (ref 2–12)
BASOPHILS # BLD: 0.02 K/UL (ref 0–0.1)
BASOPHILS NFR BLD: 0.1 % (ref 0–1)
BUN SERPL-MCNC: 5 MG/DL (ref 6–20)
BUN/CREAT SERPL: 5 (ref 12–20)
CALCIUM SERPL-MCNC: 9.3 MG/DL (ref 8.5–10.1)
CHLORIDE SERPL-SCNC: 105 MMOL/L (ref 97–108)
CO2 SERPL-SCNC: 23 MMOL/L (ref 21–32)
CREAT SERPL-MCNC: 1.06 MG/DL (ref 0.55–1.02)
DIFFERENTIAL METHOD BLD: ABNORMAL
EOSINOPHIL # BLD: 0 K/UL (ref 0–0.4)
EOSINOPHIL NFR BLD: 0 % (ref 0–7)
ERYTHROCYTE [DISTWIDTH] IN BLOOD BY AUTOMATED COUNT: 13.7 % (ref 11.5–14.5)
GLUCOSE SERPL-MCNC: 106 MG/DL (ref 65–100)
HCT VFR BLD AUTO: 49.3 % (ref 35–47)
HGB BLD-MCNC: 16.3 G/DL (ref 11.5–16)
IMM GRANULOCYTES # BLD AUTO: 0.05 K/UL (ref 0–0.04)
IMM GRANULOCYTES NFR BLD AUTO: 0.3 % (ref 0–0.5)
LYMPHOCYTES # BLD: 2.33 K/UL (ref 0.8–3.5)
LYMPHOCYTES NFR BLD: 16 % (ref 12–49)
MAGNESIUM SERPL-MCNC: 2.3 MG/DL (ref 1.6–2.4)
MCH RBC QN AUTO: 33.5 PG (ref 26–34)
MCHC RBC AUTO-ENTMCNC: 33.1 G/DL (ref 30–36.5)
MCV RBC AUTO: 101.4 FL (ref 80–99)
MONOCYTES # BLD: 0.91 K/UL (ref 0–1)
MONOCYTES NFR BLD: 6.3 % (ref 5–13)
NEUTS SEG # BLD: 11.25 K/UL (ref 1.8–8)
NEUTS SEG NFR BLD: 77.3 % (ref 32–75)
NRBC # BLD: 0 K/UL (ref 0–0.01)
NRBC BLD-RTO: 0 PER 100 WBC
PHOSPHATE SERPL-MCNC: 1.9 MG/DL (ref 2.6–4.7)
PLATELET # BLD AUTO: 400 K/UL (ref 150–400)
PMV BLD AUTO: 10.4 FL (ref 8.9–12.9)
POTASSIUM SERPL-SCNC: 3.8 MMOL/L (ref 3.5–5.1)
RBC # BLD AUTO: 4.86 M/UL (ref 3.8–5.2)
SODIUM SERPL-SCNC: 138 MMOL/L (ref 136–145)
WBC # BLD AUTO: 14.6 K/UL (ref 3.6–11)

## 2025-05-09 PROCEDURE — 80048 BASIC METABOLIC PNL TOTAL CA: CPT

## 2025-05-09 PROCEDURE — 2700000000 HC OXYGEN THERAPY PER DAY

## 2025-05-09 PROCEDURE — 93005 ELECTROCARDIOGRAM TRACING: CPT | Performed by: INTERNAL MEDICINE

## 2025-05-09 PROCEDURE — 2580000003 HC RX 258: Performed by: STUDENT IN AN ORGANIZED HEALTH CARE EDUCATION/TRAINING PROGRAM

## 2025-05-09 PROCEDURE — 85025 COMPLETE CBC W/AUTO DIFF WBC: CPT

## 2025-05-09 PROCEDURE — 84100 ASSAY OF PHOSPHORUS: CPT

## 2025-05-09 PROCEDURE — 36415 COLL VENOUS BLD VENIPUNCTURE: CPT

## 2025-05-09 PROCEDURE — 2500000003 HC RX 250 WO HCPCS: Performed by: INTERNAL MEDICINE

## 2025-05-09 PROCEDURE — 6360000002 HC RX W HCPCS: Performed by: STUDENT IN AN ORGANIZED HEALTH CARE EDUCATION/TRAINING PROGRAM

## 2025-05-09 PROCEDURE — 05H933Z INSERTION OF INFUSION DEVICE INTO RIGHT BRACHIAL VEIN, PERCUTANEOUS APPROACH: ICD-10-PCS | Performed by: INTERNAL MEDICINE

## 2025-05-09 PROCEDURE — 2500000003 HC RX 250 WO HCPCS: Performed by: NURSE PRACTITIONER

## 2025-05-09 PROCEDURE — 6360000002 HC RX W HCPCS: Performed by: INTERNAL MEDICINE

## 2025-05-09 PROCEDURE — 2060000000 HC ICU INTERMEDIATE R&B

## 2025-05-09 PROCEDURE — 36410 VNPNXR 3YR/> PHY/QHP DX/THER: CPT

## 2025-05-09 PROCEDURE — 2500000003 HC RX 250 WO HCPCS: Performed by: STUDENT IN AN ORGANIZED HEALTH CARE EDUCATION/TRAINING PROGRAM

## 2025-05-09 PROCEDURE — 2580000003 HC RX 258: Performed by: INTERNAL MEDICINE

## 2025-05-09 PROCEDURE — 83735 ASSAY OF MAGNESIUM: CPT

## 2025-05-09 RX ORDER — SODIUM CHLORIDE 0.9 % (FLUSH) 0.9 %
5-40 SYRINGE (ML) INJECTION EVERY 12 HOURS SCHEDULED
Status: DISCONTINUED | OUTPATIENT
Start: 2025-05-09 | End: 2025-05-11

## 2025-05-09 RX ORDER — DIAZEPAM 10 MG/2ML
2.5 INJECTION, SOLUTION INTRAMUSCULAR; INTRAVENOUS ONCE
Status: COMPLETED | OUTPATIENT
Start: 2025-05-09 | End: 2025-05-09

## 2025-05-09 RX ORDER — DIAZEPAM 10 MG/2ML
2.5 INJECTION, SOLUTION INTRAMUSCULAR; INTRAVENOUS EVERY 8 HOURS PRN
Status: DISCONTINUED | OUTPATIENT
Start: 2025-05-09 | End: 2025-05-10

## 2025-05-09 RX ORDER — SODIUM CHLORIDE 9 MG/ML
INJECTION, SOLUTION INTRAVENOUS CONTINUOUS
Status: DISCONTINUED | OUTPATIENT
Start: 2025-05-09 | End: 2025-05-10

## 2025-05-09 RX ORDER — SODIUM CHLORIDE 0.9 % (FLUSH) 0.9 %
5-40 SYRINGE (ML) INJECTION PRN
Status: DISCONTINUED | OUTPATIENT
Start: 2025-05-09 | End: 2025-05-11

## 2025-05-09 RX ORDER — LORAZEPAM 0.5 MG/1
0.5 TABLET ORAL EVERY 8 HOURS PRN
Status: DISCONTINUED | OUTPATIENT
Start: 2025-05-09 | End: 2025-05-09

## 2025-05-09 RX ORDER — SODIUM CHLORIDE 9 MG/ML
INJECTION, SOLUTION INTRAVENOUS PRN
Status: DISCONTINUED | OUTPATIENT
Start: 2025-05-09 | End: 2025-05-12 | Stop reason: SDUPTHER

## 2025-05-09 RX ADMIN — ONDANSETRON 4 MG: 2 INJECTION, SOLUTION INTRAMUSCULAR; INTRAVENOUS at 04:30

## 2025-05-09 RX ADMIN — DIAZEPAM 2.5 MG: 5 INJECTION, SOLUTION INTRAMUSCULAR; INTRAVENOUS at 16:22

## 2025-05-09 RX ADMIN — SODIUM CHLORIDE: 0.9 INJECTION, SOLUTION INTRAVENOUS at 21:15

## 2025-05-09 RX ADMIN — SODIUM CHLORIDE, PRESERVATIVE FREE 40 MG: 5 INJECTION INTRAVENOUS at 19:15

## 2025-05-09 RX ADMIN — DIAZEPAM 2.5 MG: 5 INJECTION, SOLUTION INTRAMUSCULAR; INTRAVENOUS at 21:08

## 2025-05-09 RX ADMIN — ENOXAPARIN SODIUM 40 MG: 100 INJECTION SUBCUTANEOUS at 08:50

## 2025-05-09 RX ADMIN — SODIUM CHLORIDE, PRESERVATIVE FREE 10 ML: 5 INJECTION INTRAVENOUS at 08:52

## 2025-05-09 RX ADMIN — SODIUM BICARBONATE: 84 INJECTION, SOLUTION INTRAVENOUS at 04:47

## 2025-05-09 RX ADMIN — SODIUM CHLORIDE: 0.9 INJECTION, SOLUTION INTRAVENOUS at 10:41

## 2025-05-09 RX ADMIN — SODIUM CHLORIDE, PRESERVATIVE FREE 40 MG: 5 INJECTION INTRAVENOUS at 08:51

## 2025-05-09 RX ADMIN — SODIUM CHLORIDE, PRESERVATIVE FREE 10 ML: 5 INJECTION INTRAVENOUS at 21:08

## 2025-05-09 RX ADMIN — DIAZEPAM 2.5 MG: 5 INJECTION, SOLUTION INTRAMUSCULAR; INTRAVENOUS at 13:19

## 2025-05-09 RX ADMIN — WATER 1000 MG: 1 INJECTION INTRAMUSCULAR; INTRAVENOUS; SUBCUTANEOUS at 04:31

## 2025-05-09 RX ADMIN — SODIUM PHOSPHATE, MONOBASIC, MONOHYDRATE AND SODIUM PHOSPHATE, DIBASIC, ANHYDROUS 10 MMOL: 276; 142 INJECTION, SOLUTION INTRAVENOUS at 16:12

## 2025-05-09 ASSESSMENT — PAIN SCALES - GENERAL: PAINLEVEL_OUTOF10: 3

## 2025-05-09 ASSESSMENT — PAIN DESCRIPTION - LOCATION: LOCATION: ABDOMEN

## 2025-05-09 ASSESSMENT — PAIN DESCRIPTION - DESCRIPTORS: DESCRIPTORS: ACHING;SPASM

## 2025-05-09 NOTE — PROCEDURES
PROCEDURE NOTE  Date: 5/9/2025   Name: Ruma Boyd  YOB: 1980    Procedures Midline Insertion Procedure Note    Procedure: Insertion of #4 FR/18G Midline    Indications:  Poor Access    Procedure Details    Risks of hemorrhage, and adverse drug reaction were discussed.  Vessel assessment revealed compressible well defined vessels.  Multiple sticks noted to BUE prior to PICC RN arrival. RUE Midline placed without complication for patient.  2ml of Lidocaine 1% administered via infiltration prior to Midline insertion.  Time-Out performed at bedside with ANIA Haskins.      #4 FR/18G Midline inserted to the R brachial vein per hospital protocol.   Blood return:  yes    Catheter length 14 cm, with 0 cm exposed. Mid upper arm circumference is 40 cm.   Catheter was flushed with 20 cc NS. Patient did tolerate procedure well. Upon completion of procedure, all MIDLINE kit components accounted for and intact.  Post Procedure hand-off given to ANIA Haskins.      Midline Brochure given to patient with teaching instruction. Bed returned to locked position with call bell in reach.

## 2025-05-09 NOTE — PLAN OF CARE
Problem: Discharge Planning  Goal: Discharge to home or other facility with appropriate resources  Outcome: Progressing  Flowsheets (Taken 5/8/2025 2033)  Discharge to home or other facility with appropriate resources:   Identify barriers to discharge with patient and caregiver   Arrange for needed discharge resources and transportation as appropriate   Identify discharge learning needs (meds, wound care, etc)   Refer to discharge planning if patient needs post-hospital services based on physician order or complex needs related to functional status, cognitive ability or social support system     Problem: Pain  Goal: Verbalizes/displays adequate comfort level or baseline comfort level  Outcome: Progressing

## 2025-05-10 ENCOUNTER — APPOINTMENT (OUTPATIENT)
Facility: HOSPITAL | Age: 45
DRG: 179 | End: 2025-05-10
Payer: MEDICAID

## 2025-05-10 ENCOUNTER — APPOINTMENT (OUTPATIENT)
Facility: HOSPITAL | Age: 45
DRG: 179 | End: 2025-05-10
Attending: INTERNAL MEDICINE
Payer: MEDICAID

## 2025-05-10 LAB
ANION GAP SERPL CALC-SCNC: 10 MMOL/L (ref 2–12)
ANION GAP SERPL CALC-SCNC: 5 MMOL/L (ref 2–12)
BUN SERPL-MCNC: 6 MG/DL (ref 6–20)
BUN SERPL-MCNC: 7 MG/DL (ref 6–20)
BUN/CREAT SERPL: 7 (ref 12–20)
BUN/CREAT SERPL: 8 (ref 12–20)
CALCIUM SERPL-MCNC: 8.6 MG/DL (ref 8.5–10.1)
CALCIUM SERPL-MCNC: 8.6 MG/DL (ref 8.5–10.1)
CHLORIDE SERPL-SCNC: 106 MMOL/L (ref 97–108)
CHLORIDE SERPL-SCNC: 107 MMOL/L (ref 97–108)
CO2 SERPL-SCNC: 20 MMOL/L (ref 21–32)
CO2 SERPL-SCNC: 25 MMOL/L (ref 21–32)
CREAT SERPL-MCNC: 0.89 MG/DL (ref 0.55–1.02)
CREAT SERPL-MCNC: 0.89 MG/DL (ref 0.55–1.02)
ECHO AO ASC DIAM: 3.4 CM
ECHO AO ASCENDING AORTA INDEX: 1.67 CM/M2
ECHO AO ROOT DIAM: 3.3 CM
ECHO AO ROOT INDEX: 1.62 CM/M2
ECHO AV AREA PEAK VELOCITY: 1.9 CM2
ECHO AV AREA VTI: 1.8 CM2
ECHO AV AREA/BSA PEAK VELOCITY: 0.9 CM2/M2
ECHO AV AREA/BSA VTI: 0.9 CM2/M2
ECHO AV MEAN GRADIENT: 7 MMHG
ECHO AV MEAN VELOCITY: 1.2 M/S
ECHO AV PEAK GRADIENT: 13 MMHG
ECHO AV PEAK VELOCITY: 1.8 M/S
ECHO AV VELOCITY RATIO: 0.61
ECHO AV VTI: 37.2 CM
ECHO BSA: 2.1 M2
ECHO LA DIAMETER INDEX: 1.13 CM/M2
ECHO LA DIAMETER: 2.3 CM
ECHO LA TO AORTIC ROOT RATIO: 0.7
ECHO LV EF PHYSICIAN: 70 %
ECHO LV FRACTIONAL SHORTENING: 38 % (ref 28–44)
ECHO LV INTERNAL DIMENSION DIASTOLE INDEX: 2.3 CM/M2
ECHO LV INTERNAL DIMENSION DIASTOLIC: 4.7 CM (ref 3.9–5.3)
ECHO LV INTERNAL DIMENSION SYSTOLIC INDEX: 1.42 CM/M2
ECHO LV INTERNAL DIMENSION SYSTOLIC: 2.9 CM
ECHO LV IVSD: 0.8 CM (ref 0.6–0.9)
ECHO LV MASS 2D: 142.7 G (ref 67–162)
ECHO LV MASS INDEX 2D: 70 G/M2 (ref 43–95)
ECHO LV POSTERIOR WALL DIASTOLIC: 1 CM (ref 0.6–0.9)
ECHO LV RELATIVE WALL THICKNESS RATIO: 0.43
ECHO LVOT AREA: 3.1 CM2
ECHO LVOT AV VTI INDEX: 0.58
ECHO LVOT DIAM: 2 CM
ECHO LVOT MEAN GRADIENT: 3 MMHG
ECHO LVOT PEAK GRADIENT: 5 MMHG
ECHO LVOT PEAK VELOCITY: 1.1 M/S
ECHO LVOT STROKE VOLUME INDEX: 33.2 ML/M2
ECHO LVOT SV: 67.8 ML
ECHO LVOT VTI: 21.6 CM
ECHO MV A VELOCITY: 0.78 M/S
ECHO MV AREA VTI: 2.1 CM2
ECHO MV E DECELERATION TIME (DT): 193.8 MS
ECHO MV E VELOCITY: 0.86 M/S
ECHO MV E/A RATIO: 1.1
ECHO MV LVOT VTI INDEX: 1.53
ECHO MV MAX VELOCITY: 1 M/S
ECHO MV MEAN GRADIENT: 2 MMHG
ECHO MV MEAN VELOCITY: 0.7 M/S
ECHO MV PEAK GRADIENT: 4 MMHG
ECHO MV VTI: 33 CM
ECHO PV MAX VELOCITY: 1.9 M/S
ECHO PV MEAN GRADIENT: 8 MMHG
ECHO PV MEAN VELOCITY: 1.3 M/S
ECHO PV PEAK GRADIENT: 15 MMHG
ECHO RV INTERNAL DIMENSION: 3.5 CM
ECHO RV TAPSE: 2.4 CM (ref 1.7–?)
ECHO TV REGURGITANT MAX VELOCITY: 1.63 M/S
ECHO TV REGURGITANT PEAK GRADIENT: 11 MMHG
ERYTHROCYTE [DISTWIDTH] IN BLOOD BY AUTOMATED COUNT: 13.5 % (ref 11.5–14.5)
GLUCOSE SERPL-MCNC: 121 MG/DL (ref 65–100)
GLUCOSE SERPL-MCNC: 129 MG/DL (ref 65–100)
HCT VFR BLD AUTO: 47.8 % (ref 35–47)
HGB BLD-MCNC: 15.6 G/DL (ref 11.5–16)
LACTATE SERPL-SCNC: 1.1 MMOL/L (ref 0.4–2)
LACTATE SERPL-SCNC: 2.9 MMOL/L (ref 0.4–2)
MAGNESIUM SERPL-MCNC: 2 MG/DL (ref 1.6–2.4)
MAGNESIUM SERPL-MCNC: 2.5 MG/DL (ref 1.6–2.4)
MCH RBC QN AUTO: 33.1 PG (ref 26–34)
MCHC RBC AUTO-ENTMCNC: 32.6 G/DL (ref 30–36.5)
MCV RBC AUTO: 101.5 FL (ref 80–99)
NRBC # BLD: 0 K/UL (ref 0–0.01)
NRBC BLD-RTO: 0 PER 100 WBC
PHOSPHATE SERPL-MCNC: 3 MG/DL (ref 2.6–4.7)
PLATELET # BLD AUTO: 347 K/UL (ref 150–400)
PMV BLD AUTO: 10.5 FL (ref 8.9–12.9)
POTASSIUM SERPL-SCNC: 3.1 MMOL/L (ref 3.5–5.1)
POTASSIUM SERPL-SCNC: 3.6 MMOL/L (ref 3.5–5.1)
RBC # BLD AUTO: 4.71 M/UL (ref 3.8–5.2)
SODIUM SERPL-SCNC: 136 MMOL/L (ref 136–145)
SODIUM SERPL-SCNC: 137 MMOL/L (ref 136–145)
TROPONIN I SERPL HS-MCNC: 9 NG/L (ref 0–51)
WBC # BLD AUTO: 9.5 K/UL (ref 3.6–11)

## 2025-05-10 PROCEDURE — 6360000002 HC RX W HCPCS: Performed by: INTERNAL MEDICINE

## 2025-05-10 PROCEDURE — 6370000000 HC RX 637 (ALT 250 FOR IP): Performed by: NURSE PRACTITIONER

## 2025-05-10 PROCEDURE — 71045 X-RAY EXAM CHEST 1 VIEW: CPT

## 2025-05-10 PROCEDURE — 2580000003 HC RX 258: Performed by: INTERNAL MEDICINE

## 2025-05-10 PROCEDURE — 80048 BASIC METABOLIC PNL TOTAL CA: CPT

## 2025-05-10 PROCEDURE — 84100 ASSAY OF PHOSPHORUS: CPT

## 2025-05-10 PROCEDURE — 2500000003 HC RX 250 WO HCPCS: Performed by: NURSE PRACTITIONER

## 2025-05-10 PROCEDURE — 2500000003 HC RX 250 WO HCPCS: Performed by: INTERNAL MEDICINE

## 2025-05-10 PROCEDURE — 83735 ASSAY OF MAGNESIUM: CPT

## 2025-05-10 PROCEDURE — 2580000003 HC RX 258: Performed by: STUDENT IN AN ORGANIZED HEALTH CARE EDUCATION/TRAINING PROGRAM

## 2025-05-10 PROCEDURE — 93306 TTE W/DOPPLER COMPLETE: CPT

## 2025-05-10 PROCEDURE — 2580000003 HC RX 258: Performed by: NURSE PRACTITIONER

## 2025-05-10 PROCEDURE — 6360000002 HC RX W HCPCS: Performed by: NURSE PRACTITIONER

## 2025-05-10 PROCEDURE — 36410 VNPNXR 3YR/> PHY/QHP DX/THER: CPT

## 2025-05-10 PROCEDURE — 6360000002 HC RX W HCPCS: Performed by: STUDENT IN AN ORGANIZED HEALTH CARE EDUCATION/TRAINING PROGRAM

## 2025-05-10 PROCEDURE — 6370000000 HC RX 637 (ALT 250 FOR IP): Performed by: INTERNAL MEDICINE

## 2025-05-10 PROCEDURE — 5A12012 PERFORMANCE OF CARDIAC OUTPUT, SINGLE, MANUAL: ICD-10-PCS | Performed by: INTERNAL MEDICINE

## 2025-05-10 PROCEDURE — 2000000000 HC ICU R&B

## 2025-05-10 PROCEDURE — 6370000000 HC RX 637 (ALT 250 FOR IP): Performed by: STUDENT IN AN ORGANIZED HEALTH CARE EDUCATION/TRAINING PROGRAM

## 2025-05-10 PROCEDURE — 83605 ASSAY OF LACTIC ACID: CPT

## 2025-05-10 PROCEDURE — 85027 COMPLETE CBC AUTOMATED: CPT

## 2025-05-10 PROCEDURE — 93005 ELECTROCARDIOGRAM TRACING: CPT | Performed by: INTERNAL MEDICINE

## 2025-05-10 PROCEDURE — 84484 ASSAY OF TROPONIN QUANT: CPT

## 2025-05-10 PROCEDURE — 36415 COLL VENOUS BLD VENIPUNCTURE: CPT

## 2025-05-10 RX ORDER — SODIUM CHLORIDE 0.9 % (FLUSH) 0.9 %
5-40 SYRINGE (ML) INJECTION PRN
Status: DISCONTINUED | OUTPATIENT
Start: 2025-05-10 | End: 2025-05-25 | Stop reason: HOSPADM

## 2025-05-10 RX ORDER — OXYCODONE HYDROCHLORIDE 5 MG/1
5 TABLET ORAL EVERY 4 HOURS PRN
Refills: 0 | Status: DISCONTINUED | OUTPATIENT
Start: 2025-05-10 | End: 2025-05-25 | Stop reason: HOSPADM

## 2025-05-10 RX ORDER — SODIUM CHLORIDE 9 MG/ML
INJECTION, SOLUTION INTRAVENOUS PRN
Status: DISCONTINUED | OUTPATIENT
Start: 2025-05-10 | End: 2025-05-12 | Stop reason: SDUPTHER

## 2025-05-10 RX ORDER — DIAZEPAM 10 MG/2ML
2.5 INJECTION, SOLUTION INTRAMUSCULAR; INTRAVENOUS ONCE
Status: COMPLETED | OUTPATIENT
Start: 2025-05-10 | End: 2025-05-10

## 2025-05-10 RX ORDER — DIAZEPAM 10 MG/2ML
5 INJECTION, SOLUTION INTRAMUSCULAR; INTRAVENOUS EVERY 6 HOURS PRN
Status: DISCONTINUED | OUTPATIENT
Start: 2025-05-10 | End: 2025-05-10

## 2025-05-10 RX ORDER — SODIUM CHLORIDE 0.9 % (FLUSH) 0.9 %
5-40 SYRINGE (ML) INJECTION EVERY 12 HOURS SCHEDULED
Status: DISCONTINUED | OUTPATIENT
Start: 2025-05-10 | End: 2025-05-25 | Stop reason: HOSPADM

## 2025-05-10 RX ORDER — OXYCODONE HCL 10 MG/1
10 TABLET, FILM COATED, EXTENDED RELEASE ORAL ONCE
Refills: 0 | Status: COMPLETED | OUTPATIENT
Start: 2025-05-10 | End: 2025-05-10

## 2025-05-10 RX ORDER — LIDOCAINE 4 G/G
1 PATCH TOPICAL DAILY
Status: DISCONTINUED | OUTPATIENT
Start: 2025-05-11 | End: 2025-05-10

## 2025-05-10 RX ORDER — FENTANYL CITRATE 50 UG/ML
INJECTION, SOLUTION INTRAMUSCULAR; INTRAVENOUS
Status: DISPENSED
Start: 2025-05-10 | End: 2025-05-10

## 2025-05-10 RX ORDER — POTASSIUM CHLORIDE 1.5 G/1.58G
40 POWDER, FOR SOLUTION ORAL 2 TIMES DAILY
Status: COMPLETED | OUTPATIENT
Start: 2025-05-10 | End: 2025-05-10

## 2025-05-10 RX ORDER — FENTANYL CITRATE 50 UG/ML
25 INJECTION, SOLUTION INTRAMUSCULAR; INTRAVENOUS ONCE
Status: COMPLETED | OUTPATIENT
Start: 2025-05-10 | End: 2025-05-10

## 2025-05-10 RX ORDER — MAGNESIUM SULFATE IN WATER 40 MG/ML
2000 INJECTION, SOLUTION INTRAVENOUS ONCE
Status: COMPLETED | OUTPATIENT
Start: 2025-05-10 | End: 2025-05-10

## 2025-05-10 RX ORDER — LIDOCAINE 4 G/G
1 PATCH TOPICAL DAILY
Status: DISCONTINUED | OUTPATIENT
Start: 2025-05-11 | End: 2025-05-25 | Stop reason: HOSPADM

## 2025-05-10 RX ORDER — PROCHLORPERAZINE EDISYLATE 5 MG/ML
10 INJECTION INTRAMUSCULAR; INTRAVENOUS ONCE
Status: COMPLETED | OUTPATIENT
Start: 2025-05-10 | End: 2025-05-10

## 2025-05-10 RX ORDER — HYDROXYZINE HYDROCHLORIDE 25 MG/1
50 TABLET, FILM COATED ORAL 3 TIMES DAILY PRN
Status: DISCONTINUED | OUTPATIENT
Start: 2025-05-10 | End: 2025-05-11

## 2025-05-10 RX ORDER — DIAZEPAM 10 MG/2ML
2.5 INJECTION, SOLUTION INTRAMUSCULAR; INTRAVENOUS EVERY 6 HOURS PRN
Status: DISCONTINUED | OUTPATIENT
Start: 2025-05-10 | End: 2025-05-10

## 2025-05-10 RX ORDER — FENTANYL CITRATE 50 UG/ML
25 INJECTION, SOLUTION INTRAMUSCULAR; INTRAVENOUS
Status: DISCONTINUED | OUTPATIENT
Start: 2025-05-10 | End: 2025-05-11

## 2025-05-10 RX ORDER — POTASSIUM CHLORIDE 7.45 MG/ML
10 INJECTION INTRAVENOUS
Status: COMPLETED | OUTPATIENT
Start: 2025-05-10 | End: 2025-05-11

## 2025-05-10 RX ORDER — POTASSIUM CHLORIDE 1.5 G/1.58G
40 POWDER, FOR SOLUTION ORAL ONCE
Status: DISCONTINUED | OUTPATIENT
Start: 2025-05-10 | End: 2025-05-10

## 2025-05-10 RX ORDER — SODIUM CHLORIDE AND POTASSIUM CHLORIDE 150; 900 MG/100ML; MG/100ML
INJECTION, SOLUTION INTRAVENOUS CONTINUOUS
Status: DISCONTINUED | OUTPATIENT
Start: 2025-05-10 | End: 2025-05-10

## 2025-05-10 RX ORDER — MAGNESIUM SULFATE IN WATER 40 MG/ML
2000 INJECTION, SOLUTION INTRAVENOUS ONCE
Status: DISCONTINUED | OUTPATIENT
Start: 2025-05-10 | End: 2025-05-11

## 2025-05-10 RX ADMIN — MAGNESIUM SULFATE HEPTAHYDRATE 2000 MG: 40 INJECTION, SOLUTION INTRAVENOUS at 11:38

## 2025-05-10 RX ADMIN — FENTANYL CITRATE 25 MCG: 50 INJECTION INTRAMUSCULAR; INTRAVENOUS at 11:27

## 2025-05-10 RX ADMIN — SODIUM CHLORIDE, PRESERVATIVE FREE 10 ML: 5 INJECTION INTRAVENOUS at 21:25

## 2025-05-10 RX ADMIN — ISOPROTERENOL HYDROCHLORIDE 2 MCG/MIN: 0.2 INJECTION, SOLUTION INTRAVENOUS at 11:52

## 2025-05-10 RX ADMIN — Medication 1 AMPULE: at 21:24

## 2025-05-10 RX ADMIN — SODIUM CHLORIDE, PRESERVATIVE FREE 40 MG: 5 INJECTION INTRAVENOUS at 06:18

## 2025-05-10 RX ADMIN — URSODIOL 300 MG: 300 CAPSULE ORAL at 21:24

## 2025-05-10 RX ADMIN — DIAZEPAM 2.5 MG: 5 INJECTION, SOLUTION INTRAMUSCULAR; INTRAVENOUS at 06:18

## 2025-05-10 RX ADMIN — OXYCODONE HYDROCHLORIDE 10 MG: 10 TABLET, FILM COATED, EXTENDED RELEASE ORAL at 13:53

## 2025-05-10 RX ADMIN — SODIUM CHLORIDE, PRESERVATIVE FREE 10 ML: 5 INJECTION INTRAVENOUS at 22:37

## 2025-05-10 RX ADMIN — FENTANYL CITRATE 25 MCG: 50 INJECTION INTRAMUSCULAR; INTRAVENOUS at 15:06

## 2025-05-10 RX ADMIN — WATER 1000 MG: 1 INJECTION INTRAMUSCULAR; INTRAVENOUS; SUBCUTANEOUS at 06:17

## 2025-05-10 RX ADMIN — OXYCODONE 5 MG: 5 TABLET ORAL at 23:54

## 2025-05-10 RX ADMIN — POTASSIUM CHLORIDE 10 MEQ: 7.46 INJECTION, SOLUTION INTRAVENOUS at 22:32

## 2025-05-10 RX ADMIN — PROCHLORPERAZINE EDISYLATE 10 MG: 5 INJECTION INTRAMUSCULAR; INTRAVENOUS at 16:49

## 2025-05-10 RX ADMIN — SODIUM CHLORIDE, PRESERVATIVE FREE 10 ML: 5 INJECTION INTRAVENOUS at 08:58

## 2025-05-10 RX ADMIN — POTASSIUM CHLORIDE 40 MEQ: 1.5 FOR SOLUTION ORAL at 12:28

## 2025-05-10 RX ADMIN — FENTANYL CITRATE 25 MCG: 50 INJECTION INTRAMUSCULAR; INTRAVENOUS at 22:35

## 2025-05-10 RX ADMIN — Medication 1 AMPULE: at 12:28

## 2025-05-10 RX ADMIN — DIAZEPAM 5 MG: 5 INJECTION, SOLUTION INTRAMUSCULAR; INTRAVENOUS at 13:26

## 2025-05-10 RX ADMIN — FENTANYL CITRATE 25 MCG: 50 INJECTION INTRAMUSCULAR; INTRAVENOUS at 19:35

## 2025-05-10 RX ADMIN — DIAZEPAM 2.5 MG: 5 INJECTION, SOLUTION INTRAMUSCULAR; INTRAVENOUS at 21:29

## 2025-05-10 RX ADMIN — FENTANYL CITRATE 25 MCG: 50 INJECTION INTRAMUSCULAR; INTRAVENOUS at 13:08

## 2025-05-10 RX ADMIN — ISOPROTERENOL HYDROCHLORIDE 2 MCG/MIN: 0.2 INJECTION, SOLUTION INTRAVENOUS at 21:54

## 2025-05-10 RX ADMIN — POTASSIUM CHLORIDE 40 MEQ: 1.5 FOR SOLUTION ORAL at 21:24

## 2025-05-10 RX ADMIN — POTASSIUM CHLORIDE 10 MEQ: 7.46 INJECTION, SOLUTION INTRAVENOUS at 23:53

## 2025-05-10 RX ADMIN — FENTANYL CITRATE 25 MCG: 50 INJECTION INTRAMUSCULAR; INTRAVENOUS at 17:12

## 2025-05-10 RX ADMIN — SODIUM CHLORIDE: 0.9 INJECTION, SOLUTION INTRAVENOUS at 22:30

## 2025-05-10 RX ADMIN — ENOXAPARIN SODIUM 40 MG: 100 INJECTION SUBCUTANEOUS at 08:56

## 2025-05-10 RX ADMIN — AMIODARONE HYDROCHLORIDE 0.5 MG/MIN: 50 INJECTION, SOLUTION INTRAVENOUS at 11:30

## 2025-05-10 RX ADMIN — PANTOPRAZOLE SODIUM 40 MG: 40 TABLET, DELAYED RELEASE ORAL at 16:30

## 2025-05-10 RX ADMIN — SODIUM CHLORIDE: 0.9 INJECTION, SOLUTION INTRAVENOUS at 07:16

## 2025-05-10 RX ADMIN — LIDOCAINE HYDROCHLORIDE 40 ML: 20 SOLUTION ORAL at 23:53

## 2025-05-10 ASSESSMENT — PAIN DESCRIPTION - LOCATION
LOCATION: ABDOMEN
LOCATION: ARM;SHOULDER;ABDOMEN
LOCATION: ARM;SHOULDER
LOCATION: ARM;SHOULDER
LOCATION: ABDOMEN
LOCATION: SHOULDER;CHEST
LOCATION: CHEST
LOCATION: ARM;SHOULDER
LOCATION: SHOULDER
LOCATION: ABDOMEN
LOCATION: CHEST;SHOULDER
LOCATION: ABDOMEN;SHOULDER

## 2025-05-10 ASSESSMENT — PAIN SCALES - GENERAL
PAINLEVEL_OUTOF10: 10
PAINLEVEL_OUTOF10: 5
PAINLEVEL_OUTOF10: 0
PAINLEVEL_OUTOF10: 0
PAINLEVEL_OUTOF10: 10
PAINLEVEL_OUTOF10: 0
PAINLEVEL_OUTOF10: 10
PAINLEVEL_OUTOF10: 10
PAINLEVEL_OUTOF10: 5
PAINLEVEL_OUTOF10: 2
PAINLEVEL_OUTOF10: 9
PAINLEVEL_OUTOF10: 8
PAINLEVEL_OUTOF10: 7
PAINLEVEL_OUTOF10: 5
PAINLEVEL_OUTOF10: 9
PAINLEVEL_OUTOF10: 9
PAINLEVEL_OUTOF10: 10

## 2025-05-10 ASSESSMENT — PAIN DESCRIPTION - ORIENTATION
ORIENTATION: RIGHT;LOWER
ORIENTATION: RIGHT;UPPER;LOWER
ORIENTATION: RIGHT;LOWER
ORIENTATION: RIGHT;LOWER;UPPER
ORIENTATION: RIGHT;UPPER;LOWER
ORIENTATION: RIGHT
ORIENTATION: ANTERIOR
ORIENTATION: RIGHT
ORIENTATION: RIGHT;UPPER;LOWER
ORIENTATION: RIGHT;MID

## 2025-05-10 ASSESSMENT — PAIN DESCRIPTION - PAIN TYPE
TYPE: ACUTE PAIN

## 2025-05-10 ASSESSMENT — PAIN - FUNCTIONAL ASSESSMENT
PAIN_FUNCTIONAL_ASSESSMENT: PREVENTS OR INTERFERES SOME ACTIVE ACTIVITIES AND ADLS

## 2025-05-10 ASSESSMENT — PAIN DESCRIPTION - DESCRIPTORS
DESCRIPTORS: SHARP
DESCRIPTORS: DISCOMFORT;CRAMPING
DESCRIPTORS: DISCOMFORT
DESCRIPTORS: DISCOMFORT
DESCRIPTORS: SHARP
DESCRIPTORS: ACHING;THROBBING
DESCRIPTORS: ACHING;THROBBING

## 2025-05-10 ASSESSMENT — PAIN DESCRIPTION - ONSET
ONSET: ON-GOING

## 2025-05-10 ASSESSMENT — PAIN DESCRIPTION - FREQUENCY
FREQUENCY: CONTINUOUS

## 2025-05-10 NOTE — PLAN OF CARE
Problem: Discharge Planning  Goal: Discharge to home or other facility with appropriate resources  5/10/2025 1313 by Sneha Ruvalcaba, RN  Outcome: Progressing     Problem: Pain  Goal: Verbalizes/displays adequate comfort level or baseline comfort level  5/10/2025 1434 by Katheryn Alvarez RN  Outcome: Progressing  5/10/2025 1313 by Sneha Ruvalcaba, RN  Outcome: Progressing     Problem: Skin/Tissue Integrity  Goal: Skin integrity remains intact  Description: 1.  Monitor for areas of redness and/or skin breakdown2.  Assess vascular access sites hourly3.  Every 4-6 hours minimum:  Change oxygen saturation probe site4.  Every 4-6 hours:  If on nasal continuous positive airway pressure, respiratory therapy assess nares and determine need for appliance change or resting period  5/10/2025 1434 by Katheryn Alvarez RN  Outcome: Progressing  5/10/2025 1313 by Sneha Ruvalcaba, RN  Outcome: Progressing  Flowsheets (Taken 5/10/2025 1200)  Skin Integrity Remains Intact: Monitor for areas of redness and/or skin breakdown     Problem: Safety - Adult  Goal: Free from fall injury  5/10/2025 1434 by Katheryn Alvarez RN  Outcome: Progressing  5/10/2025 1313 by Sneha Ruvalcaba RN  Outcome: Progressing

## 2025-05-10 NOTE — PLAN OF CARE
Problem: Discharge Planning  Goal: Discharge to home or other facility with appropriate resources  Outcome: Progressing     Problem: Pain  Goal: Verbalizes/displays adequate comfort level or baseline comfort level  Outcome: Progressing     Problem: Skin/Tissue Integrity  Goal: Skin integrity remains intact  Description: 1.  Monitor for areas of redness and/or skin breakdown2.  Assess vascular access sites hourly3.  Every 4-6 hours minimum:  Change oxygen saturation probe site4.  Every 4-6 hours:  If on nasal continuous positive airway pressure, respiratory therapy assess nares and determine need for appliance change or resting period  Outcome: Progressing  Flowsheets (Taken 5/10/2025 1200)  Skin Integrity Remains Intact: Monitor for areas of redness and/or skin breakdown     Problem: Safety - Adult  Goal: Free from fall injury  Outcome: Progressing

## 2025-05-10 NOTE — CARE COORDINATION
Transition of Care Plan:    RUR: 9%-\"Low Risk\"  Prior Level of Functioning: Independent in her ADLs and IADLs  Disposition: Home with follow-up appts  JUSTINA: 5/13/25  If SNF or IPR: Date FOC offered: N/A  Follow up appointments: PCP & Specialists as indicated  DME needed: N/A  Transportation at discharge: The patient reported that one of her family members will likely transport her home. She may potentially need   IM/IMM Medicare/ letter given: N/A-patient has Medicaid  Is patient a  and connected with VA? N/A   If yes, was  transfer form completed and VA notified?   Caregiver Contact: Oliver Dunham, Father, Phone: [phone]  Discharge Caregiver contacted prior to discharge? CM will contact her father if she requests this  Care Conference needed? No  Barriers to discharge: Pending medical clearance     The patient uses the [facility] at [facility] for her medications. She has never had HH services or been to a SNF/IPR facility in the past.       05/10/25 1246   Service Assessment   Patient Orientation Alert and Oriented   Cognition Alert   History Provided By Patient   Primary Caregiver Self   Support Systems Family Members   PCP Verified by CM Yes   Last Visit to PCP Within last two years   Prior Functional Level Independent in ADLs/IADLs   Current Functional Level Independent in ADLs/IADLs   Can patient return to prior living arrangement Yes   Family able to assist with home care needs: Yes   Would you like for me to discuss the discharge plan with any other family members/significant others, and if so, who? No   Financial Resources None   Community Resources None   Social/Functional History   Lives With Family   Type of Home House   Home Layout Two level;Able to Live on Main level with bedroom/bathroom   Home Access Level entry   Bathroom Equipment None   Home Equipment None   Active  No   Patient's  Info The patient reported that her family members assist with transportation   Mode

## 2025-05-10 NOTE — CONSULTS
Pulmonary and Critical Care  Consult Note    Requesting Provider: Dr. De Luna    Reason for Consult: Code Blue/Cardiac arrest    HPI: The patient is a 45/F who we are seeing in consultation at the request of Dr. De Luna for evaluation and treatment of cardiac arrest.     Briefly, patient was admitted on 5/7 for intractable vomiting, she was also noted to have intermittent V.tach (non-sustained) for which Cardiology/EP was following. This morning she went into sustained V.tach which to me appeared Torsades, and lost pulse. CPR was initiated and ROSC obtained after epinephrine x 1 and Shock 200 J x 1.     Post ROSC, patient is alert, awake and communicative. She is crying - stressful event.     Review of Systems: All other systems have been reviewed and are negative except per HPI    Past Medical History:  History reviewed. No pertinent past medical history.    Social History:   Unknown    Family History:  No family history on file.    Allergies:  Allergies   Allergen Reactions    Naproxen     Tramadol        Medications:  Current Facility-Administered Medications   Medication Dose Route Frequency Provider Last Rate Last Admin    0.9% NaCl with KCl 20 mEq infusion   IntraVENous Continuous Erika De Luna MD        diazePAM (VALIUM) injection 5 mg  5 mg IntraVENous Q6H PRN Erika De Luna MD        magnesium sulfate 2000 mg in 50 mL IVPB premix  2,000 mg IntraVENous Once Erika De Luna MD 25 mL/hr at 05/10/25 1138 2,000 mg at 05/10/25 1138    fentaNYL (SUBLIMAZE) 100 MCG/2ML injection             magnesium sulfate 2000 mg in 50 mL IVPB premix  2,000 mg IntraVENous Once Luis Alcala MD   Held at 05/10/25 1150    isoproterenol (ISUPREL) 1 mg in sodium chloride 0.9 % 250 mL infusion  0-10 mcg/min IntraVENous Continuous Luis Alcala MD 37.5 mL/hr at 05/10/25 1157 2.5 mcg/min at 05/10/25 1157    alcohol 62% (NOZIN) nasal  1 ampule  1 ampule Nasal BID Luis Alcala MD        sodium chloride flush 0.9 %

## 2025-05-11 ENCOUNTER — APPOINTMENT (OUTPATIENT)
Facility: HOSPITAL | Age: 45
DRG: 179 | End: 2025-05-11
Payer: MEDICAID

## 2025-05-11 LAB
ANION GAP SERPL CALC-SCNC: 6 MMOL/L (ref 2–12)
ANION GAP SERPL CALC-SCNC: NORMAL MMOL/L (ref 2–12)
BUN SERPL-MCNC: 5 MG/DL (ref 6–20)
BUN SERPL-MCNC: NORMAL MG/DL (ref 6–20)
BUN/CREAT SERPL: 7 (ref 12–20)
BUN/CREAT SERPL: NORMAL (ref 12–20)
CALCIUM SERPL-MCNC: 8.5 MG/DL (ref 8.5–10.1)
CALCIUM SERPL-MCNC: NORMAL MG/DL (ref 8.5–10.1)
CHLORIDE SERPL-SCNC: 105 MMOL/L (ref 97–108)
CHLORIDE SERPL-SCNC: NORMAL MMOL/L (ref 97–108)
CO2 SERPL-SCNC: 24 MMOL/L (ref 21–32)
CO2 SERPL-SCNC: NORMAL MMOL/L (ref 21–32)
CREAT SERPL-MCNC: 0.68 MG/DL (ref 0.55–1.02)
CREAT SERPL-MCNC: NORMAL MG/DL (ref 0.55–1.02)
GLUCOSE SERPL-MCNC: 128 MG/DL (ref 65–100)
GLUCOSE SERPL-MCNC: NORMAL MG/DL (ref 65–100)
MAGNESIUM SERPL-MCNC: 2.6 MG/DL (ref 1.6–2.4)
MAGNESIUM SERPL-MCNC: NORMAL MG/DL (ref 1.6–2.4)
PHOSPHATE SERPL-MCNC: 3.1 MG/DL (ref 2.6–4.7)
PHOSPHATE SERPL-MCNC: NORMAL MG/DL (ref 2.6–4.7)
POTASSIUM SERPL-SCNC: 3.7 MMOL/L (ref 3.5–5.1)
POTASSIUM SERPL-SCNC: NORMAL MMOL/L (ref 3.5–5.1)
SODIUM SERPL-SCNC: 135 MMOL/L (ref 136–145)
SODIUM SERPL-SCNC: NORMAL MMOL/L (ref 136–145)

## 2025-05-11 PROCEDURE — 6360000002 HC RX W HCPCS: Performed by: STUDENT IN AN ORGANIZED HEALTH CARE EDUCATION/TRAINING PROGRAM

## 2025-05-11 PROCEDURE — 6360000002 HC RX W HCPCS: Performed by: INTERNAL MEDICINE

## 2025-05-11 PROCEDURE — 84100 ASSAY OF PHOSPHORUS: CPT

## 2025-05-11 PROCEDURE — 6370000000 HC RX 637 (ALT 250 FOR IP): Performed by: INTERNAL MEDICINE

## 2025-05-11 PROCEDURE — 2500000003 HC RX 250 WO HCPCS: Performed by: NURSE PRACTITIONER

## 2025-05-11 PROCEDURE — 83735 ASSAY OF MAGNESIUM: CPT

## 2025-05-11 PROCEDURE — 80048 BASIC METABOLIC PNL TOTAL CA: CPT

## 2025-05-11 PROCEDURE — 2000000000 HC ICU R&B

## 2025-05-11 PROCEDURE — 36415 COLL VENOUS BLD VENIPUNCTURE: CPT

## 2025-05-11 PROCEDURE — 2580000003 HC RX 258: Performed by: STUDENT IN AN ORGANIZED HEALTH CARE EDUCATION/TRAINING PROGRAM

## 2025-05-11 PROCEDURE — 51798 US URINE CAPACITY MEASURE: CPT

## 2025-05-11 PROCEDURE — 6370000000 HC RX 637 (ALT 250 FOR IP): Performed by: STUDENT IN AN ORGANIZED HEALTH CARE EDUCATION/TRAINING PROGRAM

## 2025-05-11 PROCEDURE — 6360000002 HC RX W HCPCS: Performed by: NURSE PRACTITIONER

## 2025-05-11 PROCEDURE — 2500000003 HC RX 250 WO HCPCS: Performed by: INTERNAL MEDICINE

## 2025-05-11 PROCEDURE — 6370000000 HC RX 637 (ALT 250 FOR IP): Performed by: NURSE PRACTITIONER

## 2025-05-11 PROCEDURE — 36410 VNPNXR 3YR/> PHY/QHP DX/THER: CPT

## 2025-05-11 PROCEDURE — 2580000003 HC RX 258: Performed by: INTERNAL MEDICINE

## 2025-05-11 PROCEDURE — 76705 ECHO EXAM OF ABDOMEN: CPT

## 2025-05-11 RX ORDER — ALPRAZOLAM 0.25 MG
0.25 TABLET ORAL 3 TIMES DAILY PRN
Status: DISCONTINUED | OUTPATIENT
Start: 2025-05-11 | End: 2025-05-17

## 2025-05-11 RX ORDER — MAGNESIUM SULFATE IN WATER 40 MG/ML
2000 INJECTION, SOLUTION INTRAVENOUS ONCE
Status: COMPLETED | OUTPATIENT
Start: 2025-05-11 | End: 2025-05-11

## 2025-05-11 RX ORDER — POTASSIUM CHLORIDE 1.5 G/1.58G
20 POWDER, FOR SOLUTION ORAL ONCE
Status: COMPLETED | OUTPATIENT
Start: 2025-05-11 | End: 2025-05-11

## 2025-05-11 RX ORDER — PANTOPRAZOLE SODIUM 40 MG/1
40 TABLET, DELAYED RELEASE ORAL
Status: DISCONTINUED | OUTPATIENT
Start: 2025-05-12 | End: 2025-05-25 | Stop reason: HOSPADM

## 2025-05-11 RX ORDER — BISACODYL 10 MG
10 SUPPOSITORY, RECTAL RECTAL DAILY PRN
Status: DISCONTINUED | OUTPATIENT
Start: 2025-05-11 | End: 2025-05-25 | Stop reason: HOSPADM

## 2025-05-11 RX ORDER — POTASSIUM CHLORIDE 7.45 MG/ML
10 INJECTION INTRAVENOUS
Status: COMPLETED | OUTPATIENT
Start: 2025-05-11 | End: 2025-05-11

## 2025-05-11 RX ORDER — HYDROXYZINE HYDROCHLORIDE 25 MG/1
25 TABLET, FILM COATED ORAL 3 TIMES DAILY PRN
Status: DISCONTINUED | OUTPATIENT
Start: 2025-05-11 | End: 2025-05-13

## 2025-05-11 RX ORDER — POTASSIUM CHLORIDE 7.45 MG/ML
10 INJECTION INTRAVENOUS
Status: DISCONTINUED | OUTPATIENT
Start: 2025-05-11 | End: 2025-05-11

## 2025-05-11 RX ORDER — BISACODYL 10 MG
10 SUPPOSITORY, RECTAL RECTAL ONCE
Status: COMPLETED | OUTPATIENT
Start: 2025-05-11 | End: 2025-05-11

## 2025-05-11 RX ORDER — CAPSAICIN 0.025 %
CREAM (GRAM) TOPICAL 2 TIMES DAILY PRN
Status: DISCONTINUED | OUTPATIENT
Start: 2025-05-11 | End: 2025-05-25 | Stop reason: HOSPADM

## 2025-05-11 RX ADMIN — POTASSIUM CHLORIDE 10 MEQ: 10 INJECTION, SOLUTION INTRAVENOUS at 17:38

## 2025-05-11 RX ADMIN — SODIUM CHLORIDE, PRESERVATIVE FREE 10 ML: 5 INJECTION INTRAVENOUS at 20:13

## 2025-05-11 RX ADMIN — POTASSIUM CHLORIDE 10 MEQ: 7.46 INJECTION, SOLUTION INTRAVENOUS at 01:01

## 2025-05-11 RX ADMIN — FENTANYL CITRATE 25 MCG: 50 INJECTION INTRAMUSCULAR; INTRAVENOUS at 02:35

## 2025-05-11 RX ADMIN — ISOPROTERENOL HYDROCHLORIDE 1.5 MCG/MIN: 0.2 INJECTION, SOLUTION INTRAVENOUS at 10:41

## 2025-05-11 RX ADMIN — ENOXAPARIN SODIUM 40 MG: 100 INJECTION SUBCUTANEOUS at 09:19

## 2025-05-11 RX ADMIN — WATER 1000 MG: 1 INJECTION INTRAMUSCULAR; INTRAVENOUS; SUBCUTANEOUS at 06:07

## 2025-05-11 RX ADMIN — FENTANYL CITRATE 25 MCG: 50 INJECTION INTRAMUSCULAR; INTRAVENOUS at 12:13

## 2025-05-11 RX ADMIN — SODIUM CHLORIDE, PRESERVATIVE FREE 10 ML: 5 INJECTION INTRAVENOUS at 08:17

## 2025-05-11 RX ADMIN — URSODIOL 300 MG: 300 CAPSULE ORAL at 09:12

## 2025-05-11 RX ADMIN — SODIUM CHLORIDE, PRESERVATIVE FREE 40 MG: 5 INJECTION INTRAVENOUS at 06:14

## 2025-05-11 RX ADMIN — FENTANYL CITRATE 25 MCG: 50 INJECTION INTRAMUSCULAR; INTRAVENOUS at 16:07

## 2025-05-11 RX ADMIN — ALPRAZOLAM 0.25 MG: 0.25 TABLET ORAL at 12:17

## 2025-05-11 RX ADMIN — POTASSIUM CHLORIDE 10 MEQ: 10 INJECTION, SOLUTION INTRAVENOUS at 20:12

## 2025-05-11 RX ADMIN — CAPSAICIN: 0.25 CREAM TOPICAL at 23:20

## 2025-05-11 RX ADMIN — OXYCODONE 5 MG: 5 TABLET ORAL at 21:49

## 2025-05-11 RX ADMIN — ALPRAZOLAM 0.25 MG: 0.25 TABLET ORAL at 21:33

## 2025-05-11 RX ADMIN — MAGNESIUM SULFATE HEPTAHYDRATE 2000 MG: 40 INJECTION, SOLUTION INTRAVENOUS at 10:24

## 2025-05-11 RX ADMIN — Medication 1 AMPULE: at 20:13

## 2025-05-11 RX ADMIN — FENTANYL CITRATE 25 MCG: 50 INJECTION INTRAMUSCULAR; INTRAVENOUS at 09:13

## 2025-05-11 RX ADMIN — AMLODIPINE BESYLATE 5 MG: 5 TABLET ORAL at 09:12

## 2025-05-11 RX ADMIN — FENTANYL CITRATE 25 MCG: 50 INJECTION INTRAMUSCULAR; INTRAVENOUS at 06:36

## 2025-05-11 RX ADMIN — BISACODYL 10 MG: 10 SUPPOSITORY RECTAL at 12:01

## 2025-05-11 RX ADMIN — POTASSIUM CHLORIDE 10 MEQ: 10 INJECTION, SOLUTION INTRAVENOUS at 16:22

## 2025-05-11 RX ADMIN — FENTANYL CITRATE 25 MCG: 50 INJECTION INTRAMUSCULAR; INTRAVENOUS at 04:32

## 2025-05-11 RX ADMIN — POTASSIUM CHLORIDE 10 MEQ: 10 INJECTION, SOLUTION INTRAVENOUS at 18:59

## 2025-05-11 RX ADMIN — Medication 1 AMPULE: at 08:18

## 2025-05-11 RX ADMIN — FENTANYL CITRATE 25 MCG: 50 INJECTION INTRAMUSCULAR; INTRAVENOUS at 20:12

## 2025-05-11 RX ADMIN — ACETAMINOPHEN 650 MG: 325 TABLET ORAL at 21:49

## 2025-05-11 RX ADMIN — FENTANYL CITRATE 25 MCG: 50 INJECTION INTRAMUSCULAR; INTRAVENOUS at 00:33

## 2025-05-11 RX ADMIN — POTASSIUM CHLORIDE 10 MEQ: 7.46 INJECTION, SOLUTION INTRAVENOUS at 02:05

## 2025-05-11 RX ADMIN — URSODIOL 300 MG: 300 CAPSULE ORAL at 20:14

## 2025-05-11 RX ADMIN — POTASSIUM CHLORIDE 20 MEQ: 1.5 FOR SOLUTION ORAL at 16:20

## 2025-05-11 ASSESSMENT — PAIN DESCRIPTION - ONSET
ONSET: ON-GOING

## 2025-05-11 ASSESSMENT — PAIN DESCRIPTION - ORIENTATION
ORIENTATION: RIGHT;LOWER;UPPER
ORIENTATION: RIGHT;MID;LOWER
ORIENTATION: RIGHT;LOWER;UPPER
ORIENTATION: RIGHT;UPPER;LOWER
ORIENTATION: RIGHT;UPPER;LOWER
ORIENTATION: RIGHT;LOWER;UPPER
ORIENTATION: RIGHT;MID;LOWER
ORIENTATION: RIGHT;UPPER;LOWER
ORIENTATION: RIGHT;LOWER;UPPER
ORIENTATION: RIGHT;UPPER;LOWER
ORIENTATION: RIGHT;MID;LOWER
ORIENTATION: RIGHT;MID;LOWER
ORIENTATION: RIGHT
ORIENTATION: RIGHT;MID;LOWER
ORIENTATION: RIGHT;MID;LOWER
ORIENTATION: RIGHT;LOWER;UPPER
ORIENTATION: RIGHT;LOWER;MID
ORIENTATION: RIGHT
ORIENTATION: RIGHT;MID;LOWER
ORIENTATION: RIGHT
ORIENTATION: RIGHT

## 2025-05-11 ASSESSMENT — PAIN DESCRIPTION - PAIN TYPE
TYPE: ACUTE PAIN

## 2025-05-11 ASSESSMENT — PAIN SCALES - GENERAL
PAINLEVEL_OUTOF10: 8
PAINLEVEL_OUTOF10: 8
PAINLEVEL_OUTOF10: 7
PAINLEVEL_OUTOF10: 6
PAINLEVEL_OUTOF10: 8
PAINLEVEL_OUTOF10: 7
PAINLEVEL_OUTOF10: 6
PAINLEVEL_OUTOF10: 7
PAINLEVEL_OUTOF10: 8
PAINLEVEL_OUTOF10: 7
PAINLEVEL_OUTOF10: 6
PAINLEVEL_OUTOF10: 10
PAINLEVEL_OUTOF10: 7
PAINLEVEL_OUTOF10: 6
PAINLEVEL_OUTOF10: 8
PAINLEVEL_OUTOF10: 4
PAINLEVEL_OUTOF10: 8
PAINLEVEL_OUTOF10: 6
PAINLEVEL_OUTOF10: 5
PAINLEVEL_OUTOF10: 6
PAINLEVEL_OUTOF10: 5

## 2025-05-11 ASSESSMENT — PAIN - FUNCTIONAL ASSESSMENT
PAIN_FUNCTIONAL_ASSESSMENT: ACTIVITIES ARE NOT PREVENTED
PAIN_FUNCTIONAL_ASSESSMENT: PREVENTS OR INTERFERES SOME ACTIVE ACTIVITIES AND ADLS
PAIN_FUNCTIONAL_ASSESSMENT: PREVENTS OR INTERFERES SOME ACTIVE ACTIVITIES AND ADLS
PAIN_FUNCTIONAL_ASSESSMENT: ACTIVITIES ARE NOT PREVENTED
PAIN_FUNCTIONAL_ASSESSMENT: ACTIVITIES ARE NOT PREVENTED
PAIN_FUNCTIONAL_ASSESSMENT: PREVENTS OR INTERFERES SOME ACTIVE ACTIVITIES AND ADLS
PAIN_FUNCTIONAL_ASSESSMENT: ACTIVITIES ARE NOT PREVENTED
PAIN_FUNCTIONAL_ASSESSMENT: ACTIVITIES ARE NOT PREVENTED
PAIN_FUNCTIONAL_ASSESSMENT: PREVENTS OR INTERFERES SOME ACTIVE ACTIVITIES AND ADLS
PAIN_FUNCTIONAL_ASSESSMENT: ACTIVITIES ARE NOT PREVENTED
PAIN_FUNCTIONAL_ASSESSMENT: PREVENTS OR INTERFERES SOME ACTIVE ACTIVITIES AND ADLS
PAIN_FUNCTIONAL_ASSESSMENT: ACTIVITIES ARE NOT PREVENTED

## 2025-05-11 ASSESSMENT — PAIN DESCRIPTION - DESCRIPTORS
DESCRIPTORS: SORE;SHARP
DESCRIPTORS: SHARP
DESCRIPTORS: SHARP
DESCRIPTORS: ACHING;SORE
DESCRIPTORS: SHARP
DESCRIPTORS: ACHING;SORE
DESCRIPTORS: ACHING;SORE
DESCRIPTORS: SORE;SHARP
DESCRIPTORS: SHARP
DESCRIPTORS: SORE;ACHING
DESCRIPTORS: SHARP
DESCRIPTORS: ACHING;SORE
DESCRIPTORS: SHARP
DESCRIPTORS: SHARP
DESCRIPTORS: SORE;ACHING
DESCRIPTORS: SHARP
DESCRIPTORS: SORE;ACHING

## 2025-05-11 ASSESSMENT — PAIN DESCRIPTION - FREQUENCY
FREQUENCY: CONTINUOUS

## 2025-05-11 ASSESSMENT — PAIN DESCRIPTION - LOCATION
LOCATION: ABDOMEN;SHOULDER
LOCATION: ABDOMEN;NECK;SHOULDER
LOCATION: ABDOMEN;SHOULDER
LOCATION: ABDOMEN;SHOULDER
LOCATION: ABDOMEN;NECK;SHOULDER
LOCATION: ABDOMEN;SHOULDER
LOCATION: ABDOMEN;NECK;SHOULDER
LOCATION: ABDOMEN;SHOULDER
LOCATION: ABDOMEN;NECK;SHOULDER
LOCATION: ABDOMEN
LOCATION: ABDOMEN;SHOULDER

## 2025-05-12 ENCOUNTER — APPOINTMENT (OUTPATIENT)
Facility: HOSPITAL | Age: 45
DRG: 179 | End: 2025-05-12
Payer: MEDICAID

## 2025-05-12 LAB
ABO + RH BLD: NORMAL
ANION GAP SERPL CALC-SCNC: 3 MMOL/L (ref 2–12)
BASOPHILS # BLD: 0.04 K/UL (ref 0–0.1)
BASOPHILS NFR BLD: 0.3 % (ref 0–1)
BLOOD GROUP ANTIBODIES SERPL: NORMAL
BUN SERPL-MCNC: 5 MG/DL (ref 6–20)
BUN/CREAT SERPL: 8 (ref 12–20)
CALCIUM SERPL-MCNC: 8.6 MG/DL (ref 8.5–10.1)
CHLORIDE SERPL-SCNC: 105 MMOL/L (ref 97–108)
CO2 SERPL-SCNC: 27 MMOL/L (ref 21–32)
CREAT SERPL-MCNC: 0.64 MG/DL (ref 0.55–1.02)
DIFFERENTIAL METHOD BLD: ABNORMAL
EKG ATRIAL RATE: 107 BPM
EKG ATRIAL RATE: 44 BPM
EKG ATRIAL RATE: 46 BPM
EKG ATRIAL RATE: 51 BPM
EKG ATRIAL RATE: 55 BPM
EKG ATRIAL RATE: 64 BPM
EKG DIAGNOSIS: NORMAL
EKG P AXIS: -3 DEGREES
EKG P AXIS: 14 DEGREES
EKG P AXIS: 44 DEGREES
EKG P AXIS: 59 DEGREES
EKG P AXIS: 60 DEGREES
EKG P AXIS: 74 DEGREES
EKG P-R INTERVAL: 148 MS
EKG P-R INTERVAL: 156 MS
EKG P-R INTERVAL: 162 MS
EKG P-R INTERVAL: 168 MS
EKG P-R INTERVAL: 168 MS
EKG P-R INTERVAL: 170 MS
EKG Q-T INTERVAL: 358 MS
EKG Q-T INTERVAL: 468 MS
EKG Q-T INTERVAL: 512 MS
EKG Q-T INTERVAL: 552 MS
EKG Q-T INTERVAL: 562 MS
EKG Q-T INTERVAL: 570 MS
EKG QRS DURATION: 76 MS
EKG QRS DURATION: 82 MS
EKG QRS DURATION: 86 MS
EKG QRS DURATION: 88 MS
EKG QRS DURATION: 90 MS
EKG QRS DURATION: 90 MS
EKG QTC CALCULATION (BAZETT): 471 MS
EKG QTC CALCULATION (BAZETT): 477 MS
EKG QTC CALCULATION (BAZETT): 482 MS
EKG QTC CALCULATION (BAZETT): 489 MS
EKG QTC CALCULATION (BAZETT): 491 MS
EKG QTC CALCULATION (BAZETT): 525 MS
EKG R AXIS: -10 DEGREES
EKG R AXIS: 14 DEGREES
EKG R AXIS: 2 DEGREES
EKG R AXIS: 29 DEGREES
EKG R AXIS: 4 DEGREES
EKG R AXIS: 8 DEGREES
EKG T AXIS: 118 DEGREES
EKG T AXIS: 12 DEGREES
EKG T AXIS: 14 DEGREES
EKG T AXIS: 39 DEGREES
EKG T AXIS: 49 DEGREES
EKG T AXIS: 5 DEGREES
EKG VENTRICULAR RATE: 107 BPM
EKG VENTRICULAR RATE: 44 BPM
EKG VENTRICULAR RATE: 46 BPM
EKG VENTRICULAR RATE: 51 BPM
EKG VENTRICULAR RATE: 55 BPM
EKG VENTRICULAR RATE: 64 BPM
EOSINOPHIL # BLD: 0.12 K/UL (ref 0–0.4)
EOSINOPHIL NFR BLD: 0.9 % (ref 0–7)
ERYTHROCYTE [DISTWIDTH] IN BLOOD BY AUTOMATED COUNT: 13.7 % (ref 11.5–14.5)
GLUCOSE SERPL-MCNC: 104 MG/DL (ref 65–100)
HCT VFR BLD AUTO: 35.1 % (ref 35–47)
HGB BLD-MCNC: 11.4 G/DL (ref 11.5–16)
IMM GRANULOCYTES # BLD AUTO: 0.06 K/UL (ref 0–0.04)
IMM GRANULOCYTES NFR BLD AUTO: 0.5 % (ref 0–0.5)
LYMPHOCYTES # BLD: 1.62 K/UL (ref 0.8–3.5)
LYMPHOCYTES NFR BLD: 12.6 % (ref 12–49)
MAGNESIUM SERPL-MCNC: 2.4 MG/DL (ref 1.6–2.4)
MCH RBC QN AUTO: 33.2 PG (ref 26–34)
MCHC RBC AUTO-ENTMCNC: 32.5 G/DL (ref 30–36.5)
MCV RBC AUTO: 102.3 FL (ref 80–99)
MONOCYTES # BLD: 0.94 K/UL (ref 0–1)
MONOCYTES NFR BLD: 7.3 % (ref 5–13)
NEUTS SEG # BLD: 10.05 K/UL (ref 1.8–8)
NEUTS SEG NFR BLD: 78.4 % (ref 32–75)
NRBC # BLD: 0 K/UL (ref 0–0.01)
NRBC BLD-RTO: 0 PER 100 WBC
PLATELET # BLD AUTO: 247 K/UL (ref 150–400)
PMV BLD AUTO: 11.1 FL (ref 8.9–12.9)
POTASSIUM SERPL-SCNC: 4.3 MMOL/L (ref 3.5–5.1)
RBC # BLD AUTO: 3.43 M/UL (ref 3.8–5.2)
SODIUM SERPL-SCNC: 135 MMOL/L (ref 136–145)
SPECIMEN EXP DATE BLD: NORMAL
WBC # BLD AUTO: 12.8 K/UL (ref 3.6–11)

## 2025-05-12 PROCEDURE — 6370000000 HC RX 637 (ALT 250 FOR IP): Performed by: INTERNAL MEDICINE

## 2025-05-12 PROCEDURE — 36415 COLL VENOUS BLD VENIPUNCTURE: CPT

## 2025-05-12 PROCEDURE — 6360000002 HC RX W HCPCS: Performed by: STUDENT IN AN ORGANIZED HEALTH CARE EDUCATION/TRAINING PROGRAM

## 2025-05-12 PROCEDURE — 6370000000 HC RX 637 (ALT 250 FOR IP): Performed by: NURSE PRACTITIONER

## 2025-05-12 PROCEDURE — 2500000003 HC RX 250 WO HCPCS: Performed by: INTERNAL MEDICINE

## 2025-05-12 PROCEDURE — 2500000003 HC RX 250 WO HCPCS: Performed by: NURSE PRACTITIONER

## 2025-05-12 PROCEDURE — 6370000000 HC RX 637 (ALT 250 FOR IP): Performed by: STUDENT IN AN ORGANIZED HEALTH CARE EDUCATION/TRAINING PROGRAM

## 2025-05-12 PROCEDURE — 2000000000 HC ICU R&B

## 2025-05-12 PROCEDURE — 85025 COMPLETE CBC W/AUTO DIFF WBC: CPT

## 2025-05-12 PROCEDURE — 83735 ASSAY OF MAGNESIUM: CPT

## 2025-05-12 PROCEDURE — 6360000004 HC RX CONTRAST MEDICATION: Performed by: INTERNAL MEDICINE

## 2025-05-12 PROCEDURE — 2580000003 HC RX 258: Performed by: INTERNAL MEDICINE

## 2025-05-12 PROCEDURE — 86901 BLOOD TYPING SEROLOGIC RH(D): CPT

## 2025-05-12 PROCEDURE — 80048 BASIC METABOLIC PNL TOTAL CA: CPT

## 2025-05-12 PROCEDURE — 74177 CT ABD & PELVIS W/CONTRAST: CPT

## 2025-05-12 PROCEDURE — 86900 BLOOD TYPING SEROLOGIC ABO: CPT

## 2025-05-12 PROCEDURE — 93005 ELECTROCARDIOGRAM TRACING: CPT | Performed by: INTERNAL MEDICINE

## 2025-05-12 PROCEDURE — 86850 RBC ANTIBODY SCREEN: CPT

## 2025-05-12 RX ORDER — IOPAMIDOL 755 MG/ML
100 INJECTION, SOLUTION INTRAVASCULAR
Status: COMPLETED | OUTPATIENT
Start: 2025-05-12 | End: 2025-05-12

## 2025-05-12 RX ORDER — MULTIVITAMIN WITH IRON
500 TABLET ORAL DAILY
Status: DISCONTINUED | OUTPATIENT
Start: 2025-05-12 | End: 2025-05-12

## 2025-05-12 RX ORDER — IBUPROFEN 200 MG
600 CAPSULE ORAL 2 TIMES DAILY
Status: DISCONTINUED | OUTPATIENT
Start: 2025-05-12 | End: 2025-05-12

## 2025-05-12 RX ORDER — VITAMIN B COMPLEX
3000 TABLET ORAL DAILY
Status: DISCONTINUED | OUTPATIENT
Start: 2025-05-12 | End: 2025-05-12

## 2025-05-12 RX ORDER — M-VIT,TX,IRON,MINS/CALC/FOLIC 27MG-0.4MG
1 TABLET ORAL DAILY
Status: DISCONTINUED | OUTPATIENT
Start: 2025-05-12 | End: 2025-05-12

## 2025-05-12 RX ORDER — FERROUS SULFATE 325(65) MG
325 TABLET, DELAYED RELEASE (ENTERIC COATED) ORAL
Status: DISCONTINUED | OUTPATIENT
Start: 2025-05-13 | End: 2025-05-12

## 2025-05-12 RX ADMIN — SODIUM CHLORIDE, PRESERVATIVE FREE 10 ML: 5 INJECTION INTRAVENOUS at 08:12

## 2025-05-12 RX ADMIN — ALPRAZOLAM 0.25 MG: 0.25 TABLET ORAL at 20:21

## 2025-05-12 RX ADMIN — URSODIOL 300 MG: 300 CAPSULE ORAL at 08:05

## 2025-05-12 RX ADMIN — OXYCODONE 5 MG: 5 TABLET ORAL at 08:06

## 2025-05-12 RX ADMIN — ALPRAZOLAM 0.25 MG: 0.25 TABLET ORAL at 08:31

## 2025-05-12 RX ADMIN — Medication 1 AMPULE: at 20:51

## 2025-05-12 RX ADMIN — CAPSAICIN: 0.25 CREAM TOPICAL at 23:57

## 2025-05-12 RX ADMIN — Medication 1 AMPULE: at 08:12

## 2025-05-12 RX ADMIN — SODIUM CHLORIDE, PRESERVATIVE FREE 10 ML: 5 INJECTION INTRAVENOUS at 20:52

## 2025-05-12 RX ADMIN — OXYCODONE 5 MG: 5 TABLET ORAL at 20:21

## 2025-05-12 RX ADMIN — OXYCODONE 5 MG: 5 TABLET ORAL at 02:51

## 2025-05-12 RX ADMIN — CAPSAICIN: 0.25 CREAM TOPICAL at 07:45

## 2025-05-12 RX ADMIN — ENOXAPARIN SODIUM 40 MG: 100 INJECTION SUBCUTANEOUS at 08:07

## 2025-05-12 RX ADMIN — OXYCODONE 5 MG: 5 TABLET ORAL at 15:58

## 2025-05-12 RX ADMIN — ISOPROTERENOL HYDROCHLORIDE 0.5 MCG/MIN: 0.2 INJECTION, SOLUTION INTRAVENOUS at 03:01

## 2025-05-12 RX ADMIN — IOPAMIDOL 100 ML: 755 INJECTION, SOLUTION INTRAVENOUS at 12:58

## 2025-05-12 RX ADMIN — PANTOPRAZOLE SODIUM 40 MG: 40 TABLET, DELAYED RELEASE ORAL at 06:58

## 2025-05-12 RX ADMIN — OXYCODONE 5 MG: 5 TABLET ORAL at 23:58

## 2025-05-12 RX ADMIN — ALPRAZOLAM 0.25 MG: 0.25 TABLET ORAL at 15:58

## 2025-05-12 RX ADMIN — AMLODIPINE BESYLATE 5 MG: 5 TABLET ORAL at 08:06

## 2025-05-12 RX ADMIN — URSODIOL 300 MG: 300 CAPSULE ORAL at 20:51

## 2025-05-12 ASSESSMENT — PAIN - FUNCTIONAL ASSESSMENT
PAIN_FUNCTIONAL_ASSESSMENT: ACTIVITIES ARE NOT PREVENTED

## 2025-05-12 ASSESSMENT — PAIN DESCRIPTION - ONSET
ONSET: ON-GOING

## 2025-05-12 ASSESSMENT — PAIN DESCRIPTION - ORIENTATION
ORIENTATION: RIGHT;LOWER
ORIENTATION: RIGHT;LOWER
ORIENTATION: RIGHT
ORIENTATION: RIGHT;LOWER
ORIENTATION: RIGHT
ORIENTATION: MID
ORIENTATION: RIGHT

## 2025-05-12 ASSESSMENT — PAIN DESCRIPTION - PAIN TYPE
TYPE: ACUTE PAIN

## 2025-05-12 ASSESSMENT — PAIN DESCRIPTION - DESCRIPTORS
DESCRIPTORS: ACHING
DESCRIPTORS: ACHING;DISCOMFORT
DESCRIPTORS: ACHING
DESCRIPTORS: ACHING;DISCOMFORT
DESCRIPTORS: DISCOMFORT
DESCRIPTORS: SHARP

## 2025-05-12 ASSESSMENT — PAIN DESCRIPTION - FREQUENCY
FREQUENCY: CONTINUOUS

## 2025-05-12 ASSESSMENT — PAIN DESCRIPTION - LOCATION
LOCATION: ABDOMEN
LOCATION: ABDOMEN
LOCATION: ARM
LOCATION: SHOULDER
LOCATION: SHOULDER
LOCATION: ABDOMEN;SHOULDER;NECK
LOCATION: ABDOMEN
LOCATION: ABDOMEN
LOCATION: SHOULDER
LOCATION: ABDOMEN;NECK;SHOULDER

## 2025-05-12 ASSESSMENT — PAIN SCALES - GENERAL
PAINLEVEL_OUTOF10: 5
PAINLEVEL_OUTOF10: 8
PAINLEVEL_OUTOF10: 8
PAINLEVEL_OUTOF10: 6
PAINLEVEL_OUTOF10: 6
PAINLEVEL_OUTOF10: 7
PAINLEVEL_OUTOF10: 0
PAINLEVEL_OUTOF10: 6
PAINLEVEL_OUTOF10: 8
PAINLEVEL_OUTOF10: 8
PAINLEVEL_OUTOF10: 0
PAINLEVEL_OUTOF10: 8
PAINLEVEL_OUTOF10: 0

## 2025-05-12 NOTE — PLAN OF CARE
Problem: Discharge Planning  Goal: Discharge to home or other facility with appropriate resources  Outcome: Progressing  Flowsheets (Taken 5/12/2025 0800)  Discharge to home or other facility with appropriate resources: Identify barriers to discharge with patient and caregiver     Problem: Pain  Goal: Verbalizes/displays adequate comfort level or baseline comfort level  5/12/2025 0850 by Sneha Ruvalcaba RN  Outcome: Progressing  5/11/2025 2302 by Emeli Johnson RN  Outcome: Progressing     Problem: Skin/Tissue Integrity  Goal: Skin integrity remains intact  Description: 1.  Monitor for areas of redness and/or skin breakdown2.  Assess vascular access sites hourly3.  Every 4-6 hours minimum:  Change oxygen saturation probe site4.  Every 4-6 hours:  If on nasal continuous positive airway pressure, respiratory therapy assess nares and determine need for appliance change or resting period  5/12/2025 0850 by Sneha Ruvalcaba, RN  Outcome: Progressing  Flowsheets (Taken 5/12/2025 0800)  Skin Integrity Remains Intact: Monitor for areas of redness and/or skin breakdown  5/11/2025 2302 by Emeli Johnson RN  Outcome: Progressing     Problem: Safety - Adult  Goal: Free from fall injury  5/12/2025 0850 by Sneha Ruvalcaba, RN  Outcome: Progressing  Flowsheets (Taken 5/12/2025 0800)  Free From Fall Injury:   Instruct family/caregiver on patient safety   Based on caregiver fall risk screen, instruct family/caregiver to ask for assistance with transferring infant if caregiver noted to have fall risk factors  5/11/2025 2302 by Emeli Johnson, RN  Outcome: Progressing

## 2025-05-12 NOTE — INTERDISCIPLINARY ROUNDS
Critical care interdisciplinary rounds today.  Following members present:  Nursing, Nutrition, Pharmacy, and Physician. Discussed need for PICC line due to Isuprel. To contact the team to insert a line.  Plan of care discussed.  See clinical pathway for plan of care and interventions and desired outcomes.

## 2025-05-13 ENCOUNTER — APPOINTMENT (OUTPATIENT)
Facility: HOSPITAL | Age: 45
DRG: 179 | End: 2025-05-13
Payer: MEDICAID

## 2025-05-13 LAB
ALBUMIN SERPL-MCNC: 2.3 G/DL (ref 3.5–5)
ALBUMIN SERPL-MCNC: 2.4 G/DL (ref 3.5–5)
ALBUMIN/GLOB SERPL: 0.7 (ref 1.1–2.2)
ALP SERPL-CCNC: 83 U/L (ref 45–117)
ALT SERPL-CCNC: 252 U/L (ref 12–78)
ANION GAP SERPL CALC-SCNC: 7 MMOL/L (ref 2–12)
ANION GAP SERPL CALC-SCNC: 7 MMOL/L (ref 2–12)
AST SERPL-CCNC: 64 U/L (ref 15–37)
BASOPHILS # BLD: 0.04 K/UL (ref 0–0.1)
BASOPHILS NFR BLD: 0.4 % (ref 0–1)
BILIRUB DIRECT SERPL-MCNC: 0.5 MG/DL (ref 0–0.2)
BILIRUB SERPL-MCNC: 1.2 MG/DL (ref 0.2–1)
BUN SERPL-MCNC: 7 MG/DL (ref 6–20)
BUN SERPL-MCNC: 8 MG/DL (ref 6–20)
BUN/CREAT SERPL: 10 (ref 12–20)
BUN/CREAT SERPL: 14 (ref 12–20)
CALCIUM SERPL-MCNC: 8.5 MG/DL (ref 8.5–10.1)
CALCIUM SERPL-MCNC: 8.6 MG/DL (ref 8.5–10.1)
CHLORIDE SERPL-SCNC: 100 MMOL/L (ref 97–108)
CHLORIDE SERPL-SCNC: 100 MMOL/L (ref 97–108)
CO2 SERPL-SCNC: 25 MMOL/L (ref 21–32)
CO2 SERPL-SCNC: 25 MMOL/L (ref 21–32)
COMMENT:: NORMAL
CREAT SERPL-MCNC: 0.57 MG/DL (ref 0.55–1.02)
CREAT SERPL-MCNC: 0.7 MG/DL (ref 0.55–1.02)
DIFFERENTIAL METHOD BLD: ABNORMAL
EOSINOPHIL # BLD: 0.2 K/UL (ref 0–0.4)
EOSINOPHIL NFR BLD: 1.8 % (ref 0–7)
ERYTHROCYTE [DISTWIDTH] IN BLOOD BY AUTOMATED COUNT: 13.7 % (ref 11.5–14.5)
GLOBULIN SER CALC-MCNC: 3.1 G/DL (ref 2–4)
GLUCOSE SERPL-MCNC: 90 MG/DL (ref 65–100)
GLUCOSE SERPL-MCNC: 99 MG/DL (ref 65–100)
HCT VFR BLD AUTO: 30.7 % (ref 35–47)
HCT VFR BLD AUTO: 32 % (ref 35–47)
HGB BLD-MCNC: 10.1 G/DL (ref 11.5–16)
HGB BLD-MCNC: 9.6 G/DL (ref 11.5–16)
IMM GRANULOCYTES # BLD AUTO: 0.06 K/UL (ref 0–0.04)
IMM GRANULOCYTES NFR BLD AUTO: 0.6 % (ref 0–0.5)
INR PPP: 1 (ref 0.9–1.1)
LYMPHOCYTES # BLD: 2.41 K/UL (ref 0.8–3.5)
LYMPHOCYTES NFR BLD: 22.2 % (ref 12–49)
MCH RBC QN AUTO: 32.7 PG (ref 26–34)
MCHC RBC AUTO-ENTMCNC: 31.6 G/DL (ref 30–36.5)
MCV RBC AUTO: 103.6 FL (ref 80–99)
MONOCYTES # BLD: 0.91 K/UL (ref 0–1)
MONOCYTES NFR BLD: 8.4 % (ref 5–13)
NEUTS SEG # BLD: 7.25 K/UL (ref 1.8–8)
NEUTS SEG NFR BLD: 66.6 % (ref 32–75)
NRBC # BLD: 0 K/UL (ref 0–0.01)
NRBC BLD-RTO: 0 PER 100 WBC
PHOSPHATE SERPL-MCNC: 3.9 MG/DL (ref 2.6–4.7)
PLATELET # BLD AUTO: 217 K/UL (ref 150–400)
PMV BLD AUTO: 11 FL (ref 8.9–12.9)
POTASSIUM SERPL-SCNC: 4.2 MMOL/L (ref 3.5–5.1)
POTASSIUM SERPL-SCNC: 4.2 MMOL/L (ref 3.5–5.1)
PROT SERPL-MCNC: 5.4 G/DL (ref 6.4–8.2)
PROTHROMBIN TIME: 10.7 SEC (ref 9.2–11.2)
RBC # BLD AUTO: 3.09 M/UL (ref 3.8–5.2)
SODIUM SERPL-SCNC: 132 MMOL/L (ref 136–145)
SODIUM SERPL-SCNC: 132 MMOL/L (ref 136–145)
SPECIMEN HOLD: NORMAL
WBC # BLD AUTO: 10.9 K/UL (ref 3.6–11)

## 2025-05-13 PROCEDURE — 2500000003 HC RX 250 WO HCPCS: Performed by: NURSE PRACTITIONER

## 2025-05-13 PROCEDURE — 93005 ELECTROCARDIOGRAM TRACING: CPT | Performed by: INTERNAL MEDICINE

## 2025-05-13 PROCEDURE — 2720000010 US ASP ABSCESS/HEMATOMA/BULLA/CYST

## 2025-05-13 PROCEDURE — 85018 HEMOGLOBIN: CPT

## 2025-05-13 PROCEDURE — 6370000000 HC RX 637 (ALT 250 FOR IP): Performed by: NURSE PRACTITIONER

## 2025-05-13 PROCEDURE — 6370000000 HC RX 637 (ALT 250 FOR IP): Performed by: INTERNAL MEDICINE

## 2025-05-13 PROCEDURE — 36415 COLL VENOUS BLD VENIPUNCTURE: CPT

## 2025-05-13 PROCEDURE — 6360000002 HC RX W HCPCS: Performed by: STUDENT IN AN ORGANIZED HEALTH CARE EDUCATION/TRAINING PROGRAM

## 2025-05-13 PROCEDURE — 6370000000 HC RX 637 (ALT 250 FOR IP): Performed by: STUDENT IN AN ORGANIZED HEALTH CARE EDUCATION/TRAINING PROGRAM

## 2025-05-13 PROCEDURE — 85025 COMPLETE CBC W/AUTO DIFF WBC: CPT

## 2025-05-13 PROCEDURE — 80069 RENAL FUNCTION PANEL: CPT

## 2025-05-13 PROCEDURE — 85014 HEMATOCRIT: CPT

## 2025-05-13 PROCEDURE — 80076 HEPATIC FUNCTION PANEL: CPT

## 2025-05-13 PROCEDURE — 85610 PROTHROMBIN TIME: CPT

## 2025-05-13 PROCEDURE — 80048 BASIC METABOLIC PNL TOTAL CA: CPT

## 2025-05-13 PROCEDURE — 0F9130Z DRAINAGE OF RIGHT LOBE LIVER WITH DRAINAGE DEVICE, PERCUTANEOUS APPROACH: ICD-10-PCS | Performed by: STUDENT IN AN ORGANIZED HEALTH CARE EDUCATION/TRAINING PROGRAM

## 2025-05-13 PROCEDURE — 2000000000 HC ICU R&B

## 2025-05-13 RX ORDER — LIDOCAINE HYDROCHLORIDE 10 MG/ML
10 INJECTION, SOLUTION EPIDURAL; INFILTRATION; INTRACAUDAL; PERINEURAL ONCE
Status: COMPLETED | OUTPATIENT
Start: 2025-05-13 | End: 2025-05-13

## 2025-05-13 RX ORDER — IBUPROFEN 200 MG
600 CAPSULE ORAL 2 TIMES DAILY
Status: DISCONTINUED | OUTPATIENT
Start: 2025-05-13 | End: 2025-05-25 | Stop reason: HOSPADM

## 2025-05-13 RX ORDER — VITAMIN B COMPLEX
3000 TABLET ORAL DAILY
Status: DISCONTINUED | OUTPATIENT
Start: 2025-05-13 | End: 2025-05-25 | Stop reason: HOSPADM

## 2025-05-13 RX ORDER — FENTANYL CITRATE 50 UG/ML
INJECTION, SOLUTION INTRAMUSCULAR; INTRAVENOUS PRN
Status: COMPLETED | OUTPATIENT
Start: 2025-05-13 | End: 2025-05-13

## 2025-05-13 RX ADMIN — ACETAMINOPHEN 650 MG: 325 TABLET ORAL at 17:30

## 2025-05-13 RX ADMIN — OXYCODONE 5 MG: 5 TABLET ORAL at 12:21

## 2025-05-13 RX ADMIN — OXYCODONE 5 MG: 5 TABLET ORAL at 08:19

## 2025-05-13 RX ADMIN — SODIUM CHLORIDE, PRESERVATIVE FREE 10 ML: 5 INJECTION INTRAVENOUS at 08:24

## 2025-05-13 RX ADMIN — LIDOCAINE HYDROCHLORIDE 10 ML: 10 INJECTION, SOLUTION EPIDURAL; INFILTRATION; INTRACAUDAL; PERINEURAL at 15:49

## 2025-05-13 RX ADMIN — URSODIOL 300 MG: 300 CAPSULE ORAL at 08:22

## 2025-05-13 RX ADMIN — OXYCODONE 5 MG: 5 TABLET ORAL at 16:14

## 2025-05-13 RX ADMIN — ALPRAZOLAM 0.25 MG: 0.25 TABLET ORAL at 22:51

## 2025-05-13 RX ADMIN — Medication 3000 UNITS: at 11:33

## 2025-05-13 RX ADMIN — FENTANYL CITRATE 25 MCG: 50 INJECTION, SOLUTION INTRAMUSCULAR; INTRAVENOUS at 14:55

## 2025-05-13 RX ADMIN — AMLODIPINE BESYLATE 5 MG: 5 TABLET ORAL at 08:19

## 2025-05-13 RX ADMIN — OXYCODONE 5 MG: 5 TABLET ORAL at 20:30

## 2025-05-13 RX ADMIN — Medication 600 MG: at 20:30

## 2025-05-13 RX ADMIN — ALPRAZOLAM 0.25 MG: 0.25 TABLET ORAL at 16:14

## 2025-05-13 RX ADMIN — PANTOPRAZOLE SODIUM 40 MG: 40 TABLET, DELAYED RELEASE ORAL at 06:16

## 2025-05-13 RX ADMIN — ALPRAZOLAM 0.25 MG: 0.25 TABLET ORAL at 08:19

## 2025-05-13 RX ADMIN — URSODIOL 300 MG: 300 CAPSULE ORAL at 20:30

## 2025-05-13 RX ADMIN — ACETAMINOPHEN 650 MG: 325 TABLET ORAL at 03:41

## 2025-05-13 RX ADMIN — Medication 1 AMPULE: at 08:24

## 2025-05-13 RX ADMIN — POLYETHYLENE GLYCOL 3350 17 G: 17 POWDER, FOR SOLUTION ORAL at 11:35

## 2025-05-13 RX ADMIN — Medication 600 MG: at 11:24

## 2025-05-13 RX ADMIN — Medication 1 TABLET: at 11:25

## 2025-05-13 RX ADMIN — SODIUM CHLORIDE, PRESERVATIVE FREE 10 ML: 5 INJECTION INTRAVENOUS at 20:32

## 2025-05-13 RX ADMIN — Medication 1 AMPULE: at 20:32

## 2025-05-13 ASSESSMENT — PAIN - FUNCTIONAL ASSESSMENT
PAIN_FUNCTIONAL_ASSESSMENT: PREVENTS OR INTERFERES SOME ACTIVE ACTIVITIES AND ADLS
PAIN_FUNCTIONAL_ASSESSMENT: ACTIVITIES ARE NOT PREVENTED
PAIN_FUNCTIONAL_ASSESSMENT: NONE - DENIES PAIN
PAIN_FUNCTIONAL_ASSESSMENT_SITE2: PREVENTS OR INTERFERES SOME ACTIVE ACTIVITIES AND ADLS
PAIN_FUNCTIONAL_ASSESSMENT: PREVENTS OR INTERFERES SOME ACTIVE ACTIVITIES AND ADLS
PAIN_FUNCTIONAL_ASSESSMENT_SITE2: PREVENTS OR INTERFERES SOME ACTIVE ACTIVITIES AND ADLS

## 2025-05-13 ASSESSMENT — PAIN SCALES - GENERAL
PAINLEVEL_OUTOF10: 6
PAINLEVEL_OUTOF10: 8
PAINLEVEL_OUTOF10: 0
PAINLEVEL_OUTOF10: 6
PAINLEVEL_OUTOF10: 9
PAINLEVEL_OUTOF10: 3
PAINLEVEL_OUTOF10: 6
PAINLEVEL_OUTOF10: 0
PAINLEVEL_OUTOF10: 6
PAINLEVEL_OUTOF10: 6
PAINLEVEL_OUTOF10: 5
PAINLEVEL_OUTOF10: 10
PAINLEVEL_OUTOF10: 6
PAINLEVEL_OUTOF10: 9

## 2025-05-13 ASSESSMENT — PAIN DESCRIPTION - ORIENTATION
ORIENTATION: RIGHT;UPPER
ORIENTATION: RIGHT
ORIENTATION: RIGHT;UPPER
ORIENTATION: RIGHT
ORIENTATION: RIGHT
ORIENTATION_2: RIGHT
ORIENTATION: RIGHT
ORIENTATION_2: RIGHT

## 2025-05-13 ASSESSMENT — PAIN DESCRIPTION - DESCRIPTORS
DESCRIPTORS_2: ACHING;SHARP
DESCRIPTORS: ACHING;SHARP
DESCRIPTORS: ACHING;SHARP
DESCRIPTORS: ACHING
DESCRIPTORS_2: ACHING;SHARP
DESCRIPTORS: ACHING
DESCRIPTORS: SHARP;ACHING

## 2025-05-13 ASSESSMENT — PAIN DESCRIPTION - LOCATION
LOCATION_2: SHOULDER
LOCATION: SHOULDER
LOCATION: ABDOMEN
LOCATION_2: SHOULDER
LOCATION: FLANK

## 2025-05-13 ASSESSMENT — PAIN DESCRIPTION - FREQUENCY
FREQUENCY: CONTINUOUS
FREQUENCY: CONTINUOUS

## 2025-05-13 ASSESSMENT — PAIN DESCRIPTION - ONSET
ONSET: ON-GOING
ONSET: ON-GOING

## 2025-05-13 ASSESSMENT — PAIN DESCRIPTION - INTENSITY
RATING_2: 6
RATING_2: 7

## 2025-05-13 ASSESSMENT — PAIN DESCRIPTION - PAIN TYPE
TYPE: ACUTE PAIN
TYPE: ACUTE PAIN

## 2025-05-13 ASSESSMENT — PAIN DESCRIPTION - DIRECTION
RADIATING_TOWARDS: NO
RADIATING_TOWARDS: NO

## 2025-05-13 ASSESSMENT — PAIN SCALES - WONG BAKER: WONGBAKER_NUMERICALRESPONSE: NO HURT

## 2025-05-13 NOTE — CONSULTS
Neurosurgery was consulted regarding the patient and her  shunt and compatibility with MRI. Per review of records, the patient has a median pressure fixed valve implanted in 2010. She follows with Dr. Orta at [facility]. An MRI should not interfere with her shunt or require adjusting because of the fixed pressure aspect.  She also apparently had an MRI in 2023 at [facility] without issue.  If there is still concern over compatibility despite this information, we would recommend contacting [facility] neurosurgery where she is followed for the shunt.      Harinder Brice PA-C    Supervising MD: Michelet Ray

## 2025-05-13 NOTE — PLAN OF CARE
Problem: Pain  Goal: Verbalizes/displays adequate comfort level or baseline comfort level  Outcome: Progressing  Flowsheets (Taken 5/12/2025 2021)  Verbalizes/displays adequate comfort level or baseline comfort level:   Encourage patient to monitor pain and request assistance   Assess pain using appropriate pain scale   Administer analgesics based on type and severity of pain and evaluate response   Implement non-pharmacological measures as appropriate and evaluate response   Consider cultural and social influences on pain and pain management   Notify Licensed Independent Practitioner if interventions unsuccessful or patient reports new pain     Problem: Skin/Tissue Integrity  Goal: Skin integrity remains intact  Description: 1.  Monitor for areas of redness and/or skin breakdown2.  Assess vascular access sites hourly3.  Every 4-6 hours minimum:  Change oxygen saturation probe site4.  Every 4-6 hours:  If on nasal continuous positive airway pressure, respiratory therapy assess nares and determine need for appliance change or resting period  Outcome: Progressing  Flowsheets (Taken 5/12/2025 2000)  Skin Integrity Remains Intact:   Monitor for areas of redness and/or skin breakdown   Assess vascular access sites hourly   Every 4-6 hours minimum:  Change oxygen saturation probe site   Turn and reposition as indicated   Assess need for specialty bed   Check visual cues for pain   Monitor skin under medical devices     Problem: Safety - Adult  Goal: Free from fall injury  Outcome: Progressing  Flowsheets (Taken 5/12/2025 2000)  Free From Fall Injury: Instruct family/caregiver on patient safety     Problem: Respiratory - Adult  Goal: Achieves optimal ventilation and oxygenation  Outcome: Progressing  Flowsheets (Taken 5/12/2025 2000)  Achieves optimal ventilation and oxygenation:   Assess for changes in respiratory status   Assess for changes in mentation and behavior   Position to facilitate oxygenation and minimize

## 2025-05-13 NOTE — PROCEDURES
Brief Postoperative Note    Ruma Boyd  YOB: 1980  [id number]    Pre-operative Diagnosis: Subcapsular liver hematoma, elevated LFTs    Post-operative Diagnosis: Same    Procedure: Liver hematoma drainage catheter placement     Anesthesia: Local    Surgeons/Assistants: Sari Cleary MD    Estimated Blood Loss: Minimal     Complications: None    Specimens: Was Obtained: Thin old appearing blood products     Findings:   Placement of a 14 [place] subcapsular liver hematoma drain.     Electronically signed by Sari Cleary MD on 5/13/2025 at 3:33 PM

## 2025-05-13 NOTE — CONSULTS
Radiology History and Physical    Patient: Ruma Boyd 45 y.o. female       Chief Complaint: Vomiting (Patient BIBEMS from home for N/V since 1100. Pt reports hx gastric sleeve surgery in December. Patient also endorses middle abdominal pain. )    History of Present Illness: 45 y.o. woman with IIH (VPS) admitted to the ED with nausea, vomiting, and bradycardia. On 5/10 she had sustained VT/torsades for which she briefly underwent CPR. Imaging has subsequently shown a large subcapsular hepatic hematoma, and she has had elevation in her LFTs. IR is consulted for hematoma drainage.    History:    History reviewed. No pertinent past medical history.  No family history on file.  Social History     Socioeconomic History    Marital status: Single     Spouse name: Not on file    Number of children: Not on file    Years of education: Not on file    Highest education level: Not on file   Occupational History    Not on file   Tobacco Use    Smoking status: Never    Smokeless tobacco: Never   Substance and Sexual Activity    Alcohol use: Not on file    Drug use: Not on file    Sexual activity: Not on file   Other Topics Concern    Not on file   Social History Narrative    Not on file     Social Drivers of Health     Financial Resource Strain: Not on file   Food Insecurity: No Food Insecurity (5/8/2025)    Hunger Vital Sign     Worried About Running Out of Food in the Last Year: Never true     Ran Out of Food in the Last Year: Never true   Transportation Needs: No Transportation Needs (5/8/2025)    PRAPARE - Transportation     Lack of Transportation (Medical): No     Lack of Transportation (Non-Medical): No   Physical Activity: Not on file   Stress: Not on file   Social Connections: Not on file   Intimate Partner Violence: Not on file   Housing Stability: Low Risk  (5/8/2025)    Housing Stability Vital Sign     Unable to Pay for Housing in the Last Year: No     Number of Times Moved in the Last Year: 0     Homeless in the

## 2025-05-13 NOTE — INTERDISCIPLINARY ROUNDS
Interdisciplinary team rounds were held 5/13/2025 with the following team members: Physician, Nursing, Dietitian, Pharmacist and the CCU Charge RN.    Plan of care discussed. See clinical pathway and/or care plan for interventions and desired outcomes.

## 2025-05-14 ENCOUNTER — APPOINTMENT (OUTPATIENT)
Facility: HOSPITAL | Age: 45
DRG: 179 | End: 2025-05-14
Payer: MEDICAID

## 2025-05-14 LAB
ALBUMIN SERPL-MCNC: 2.4 G/DL (ref 3.5–5)
ALBUMIN/GLOB SERPL: 0.8 (ref 1.1–2.2)
ALP SERPL-CCNC: 83 U/L (ref 45–117)
ALT SERPL-CCNC: 177 U/L (ref 12–78)
ANION GAP SERPL CALC-SCNC: 6 MMOL/L (ref 2–12)
AST SERPL-CCNC: 34 U/L (ref 15–37)
BASOPHILS # BLD: 0.05 K/UL (ref 0–0.1)
BASOPHILS NFR BLD: 0.4 % (ref 0–1)
BILIRUB DIRECT SERPL-MCNC: 0.4 MG/DL (ref 0–0.2)
BILIRUB SERPL-MCNC: 0.9 MG/DL (ref 0.2–1)
BUN SERPL-MCNC: 7 MG/DL (ref 6–20)
BUN/CREAT SERPL: 12 (ref 12–20)
CALCIUM SERPL-MCNC: 8.6 MG/DL (ref 8.5–10.1)
CHLORIDE SERPL-SCNC: 100 MMOL/L (ref 97–108)
CO2 SERPL-SCNC: 27 MMOL/L (ref 21–32)
CREAT SERPL-MCNC: 0.6 MG/DL (ref 0.55–1.02)
DIFFERENTIAL METHOD BLD: ABNORMAL
EKG ATRIAL RATE: 85 BPM
EKG DIAGNOSIS: NORMAL
EKG P AXIS: 61 DEGREES
EKG P-R INTERVAL: 164 MS
EKG Q-T INTERVAL: 422 MS
EKG QRS DURATION: 84 MS
EKG QTC CALCULATION (BAZETT): 502 MS
EKG R AXIS: 26 DEGREES
EKG T AXIS: 20 DEGREES
EKG VENTRICULAR RATE: 85 BPM
EOSINOPHIL # BLD: 0.21 K/UL (ref 0–0.4)
EOSINOPHIL NFR BLD: 1.9 % (ref 0–7)
ERYTHROCYTE [DISTWIDTH] IN BLOOD BY AUTOMATED COUNT: 13.5 % (ref 11.5–14.5)
GLOBULIN SER CALC-MCNC: 3.1 G/DL (ref 2–4)
GLUCOSE SERPL-MCNC: 95 MG/DL (ref 65–100)
HCT VFR BLD AUTO: 30.1 % (ref 35–47)
HGB BLD-MCNC: 9.7 G/DL (ref 11.5–16)
IMM GRANULOCYTES # BLD AUTO: 0.06 K/UL (ref 0–0.04)
IMM GRANULOCYTES NFR BLD AUTO: 0.5 % (ref 0–0.5)
LYMPHOCYTES # BLD: 1.95 K/UL (ref 0.8–3.5)
LYMPHOCYTES NFR BLD: 17.2 % (ref 12–49)
MCH RBC QN AUTO: 33.4 PG (ref 26–34)
MCHC RBC AUTO-ENTMCNC: 32.2 G/DL (ref 30–36.5)
MCV RBC AUTO: 103.8 FL (ref 80–99)
MONOCYTES # BLD: 0.75 K/UL (ref 0–1)
MONOCYTES NFR BLD: 6.6 % (ref 5–13)
NEUTS SEG # BLD: 8.33 K/UL (ref 1.8–8)
NEUTS SEG NFR BLD: 73.4 % (ref 32–75)
NRBC # BLD: 0 K/UL (ref 0–0.01)
NRBC BLD-RTO: 0 PER 100 WBC
PLATELET # BLD AUTO: 253 K/UL (ref 150–400)
PMV BLD AUTO: 10.9 FL (ref 8.9–12.9)
POTASSIUM SERPL-SCNC: 3.7 MMOL/L (ref 3.5–5.1)
PROT SERPL-MCNC: 5.5 G/DL (ref 6.4–8.2)
RBC # BLD AUTO: 2.9 M/UL (ref 3.8–5.2)
SODIUM SERPL-SCNC: 133 MMOL/L (ref 136–145)
WBC # BLD AUTO: 11.4 K/UL (ref 3.6–11)

## 2025-05-14 PROCEDURE — 36415 COLL VENOUS BLD VENIPUNCTURE: CPT

## 2025-05-14 PROCEDURE — 6370000000 HC RX 637 (ALT 250 FOR IP): Performed by: INTERNAL MEDICINE

## 2025-05-14 PROCEDURE — 80048 BASIC METABOLIC PNL TOTAL CA: CPT

## 2025-05-14 PROCEDURE — 6370000000 HC RX 637 (ALT 250 FOR IP): Performed by: NURSE PRACTITIONER

## 2025-05-14 PROCEDURE — 2500000003 HC RX 250 WO HCPCS: Performed by: NURSE PRACTITIONER

## 2025-05-14 PROCEDURE — 2000000000 HC ICU R&B

## 2025-05-14 PROCEDURE — 80076 HEPATIC FUNCTION PANEL: CPT

## 2025-05-14 PROCEDURE — 6370000000 HC RX 637 (ALT 250 FOR IP): Performed by: STUDENT IN AN ORGANIZED HEALTH CARE EDUCATION/TRAINING PROGRAM

## 2025-05-14 PROCEDURE — 85025 COMPLETE CBC W/AUTO DIFF WBC: CPT

## 2025-05-14 RX ADMIN — Medication 600 MG: at 12:05

## 2025-05-14 RX ADMIN — Medication 600 MG: at 20:28

## 2025-05-14 RX ADMIN — BISACODYL 10 MG: 10 SUPPOSITORY RECTAL at 21:48

## 2025-05-14 RX ADMIN — URSODIOL 300 MG: 300 CAPSULE ORAL at 10:47

## 2025-05-14 RX ADMIN — Medication 1 AMPULE: at 12:05

## 2025-05-14 RX ADMIN — OXYCODONE 5 MG: 5 TABLET ORAL at 09:36

## 2025-05-14 RX ADMIN — PANTOPRAZOLE SODIUM 40 MG: 40 TABLET, DELAYED RELEASE ORAL at 06:15

## 2025-05-14 RX ADMIN — SODIUM CHLORIDE, PRESERVATIVE FREE 10 ML: 5 INJECTION INTRAVENOUS at 20:28

## 2025-05-14 RX ADMIN — OXYCODONE 5 MG: 5 TABLET ORAL at 16:07

## 2025-05-14 RX ADMIN — OXYCODONE 5 MG: 5 TABLET ORAL at 21:48

## 2025-05-14 RX ADMIN — SODIUM CHLORIDE, PRESERVATIVE FREE 10 ML: 5 INJECTION INTRAVENOUS at 10:48

## 2025-05-14 RX ADMIN — ALPRAZOLAM 0.25 MG: 0.25 TABLET ORAL at 18:52

## 2025-05-14 RX ADMIN — OXYCODONE 5 MG: 5 TABLET ORAL at 03:28

## 2025-05-14 RX ADMIN — Medication 1 AMPULE: at 20:28

## 2025-05-14 RX ADMIN — Medication 1 TABLET: at 10:46

## 2025-05-14 RX ADMIN — URSODIOL 300 MG: 300 CAPSULE ORAL at 20:28

## 2025-05-14 RX ADMIN — ALPRAZOLAM 0.25 MG: 0.25 TABLET ORAL at 10:47

## 2025-05-14 ASSESSMENT — PAIN DESCRIPTION - ORIENTATION
ORIENTATION: RIGHT;UPPER
ORIENTATION: RIGHT
ORIENTATION: RIGHT;UPPER
ORIENTATION_2: RIGHT
ORIENTATION: RIGHT;UPPER

## 2025-05-14 ASSESSMENT — PAIN DESCRIPTION - LOCATION
LOCATION: ABDOMEN
LOCATION: FLANK
LOCATION_2: SHOULDER
LOCATION: ABDOMEN

## 2025-05-14 ASSESSMENT — PAIN SCALES - GENERAL
PAINLEVEL_OUTOF10: 6
PAINLEVEL_OUTOF10: 8
PAINLEVEL_OUTOF10: 7
PAINLEVEL_OUTOF10: 8
PAINLEVEL_OUTOF10: 8
PAINLEVEL_OUTOF10: 9
PAINLEVEL_OUTOF10: 6
PAINLEVEL_OUTOF10: 6
PAINLEVEL_OUTOF10: 8
PAINLEVEL_OUTOF10: 0
PAINLEVEL_OUTOF10: 7
PAINLEVEL_OUTOF10: 0
PAINLEVEL_OUTOF10: 8
PAINLEVEL_OUTOF10: 6

## 2025-05-14 ASSESSMENT — PAIN DESCRIPTION - DIRECTION
RADIATING_TOWARDS: NO

## 2025-05-14 ASSESSMENT — PAIN DESCRIPTION - DESCRIPTORS
DESCRIPTORS: ACHING;SHARP
DESCRIPTORS_2: ACHING
DESCRIPTORS: ACHING

## 2025-05-14 ASSESSMENT — PAIN DESCRIPTION - PAIN TYPE
TYPE: ACUTE PAIN

## 2025-05-14 ASSESSMENT — PAIN - FUNCTIONAL ASSESSMENT
PAIN_FUNCTIONAL_ASSESSMENT_SITE2: ACTIVITIES ARE NOT PREVENTED
PAIN_FUNCTIONAL_ASSESSMENT: ACTIVITIES ARE NOT PREVENTED
PAIN_FUNCTIONAL_ASSESSMENT: PREVENTS OR INTERFERES SOME ACTIVE ACTIVITIES AND ADLS
PAIN_FUNCTIONAL_ASSESSMENT: ACTIVITIES ARE NOT PREVENTED
PAIN_FUNCTIONAL_ASSESSMENT: ACTIVITIES ARE NOT PREVENTED

## 2025-05-14 ASSESSMENT — PAIN DESCRIPTION - FREQUENCY
FREQUENCY: CONTINUOUS

## 2025-05-14 ASSESSMENT — PAIN DESCRIPTION - INTENSITY: RATING_2: 6

## 2025-05-14 ASSESSMENT — PAIN DESCRIPTION - ONSET
ONSET: ON-GOING

## 2025-05-14 NOTE — INTERDISCIPLINARY ROUNDS
Critical care interdisciplinary rounds today.  Following members present: Case Management, Clinical Lead, Nursing, Nutrition, Pharmacy, and Physician. Family invited to participate.  Plan of care discussed.  See clinical pathway for plan of care and interventions and desired outcomes.

## 2025-05-15 LAB
ALBUMIN SERPL-MCNC: 2.5 G/DL (ref 3.5–5)
ALBUMIN/GLOB SERPL: 0.7 (ref 1.1–2.2)
ALP SERPL-CCNC: 84 U/L (ref 45–117)
ALT SERPL-CCNC: 130 U/L (ref 12–78)
ANION GAP SERPL CALC-SCNC: 7 MMOL/L (ref 2–12)
AST SERPL-CCNC: 24 U/L (ref 15–37)
BASOPHILS # BLD: 0.02 K/UL (ref 0–0.1)
BASOPHILS NFR BLD: 0.2 % (ref 0–1)
BILIRUB SERPL-MCNC: 0.8 MG/DL (ref 0.2–1)
BUN SERPL-MCNC: 7 MG/DL (ref 6–20)
BUN/CREAT SERPL: 10 (ref 12–20)
CALCIUM SERPL-MCNC: 9.2 MG/DL (ref 8.5–10.1)
CHLORIDE SERPL-SCNC: 100 MMOL/L (ref 97–108)
CO2 SERPL-SCNC: 29 MMOL/L (ref 21–32)
CREAT SERPL-MCNC: 0.71 MG/DL (ref 0.55–1.02)
DIFFERENTIAL METHOD BLD: ABNORMAL
EOSINOPHIL # BLD: 0.17 K/UL (ref 0–0.4)
EOSINOPHIL NFR BLD: 1.7 % (ref 0–7)
ERYTHROCYTE [DISTWIDTH] IN BLOOD BY AUTOMATED COUNT: 13.6 % (ref 11.5–14.5)
GLOBULIN SER CALC-MCNC: 3.6 G/DL (ref 2–4)
GLUCOSE SERPL-MCNC: 86 MG/DL (ref 65–100)
HCT VFR BLD AUTO: 30.9 % (ref 35–47)
HGB BLD-MCNC: 10.1 G/DL (ref 11.5–16)
IMM GRANULOCYTES # BLD AUTO: 0.05 K/UL (ref 0–0.04)
IMM GRANULOCYTES NFR BLD AUTO: 0.5 % (ref 0–0.5)
LYMPHOCYTES # BLD: 1.86 K/UL (ref 0.8–3.5)
LYMPHOCYTES NFR BLD: 18.4 % (ref 12–49)
MAGNESIUM SERPL-MCNC: 2.2 MG/DL (ref 1.6–2.4)
MCH RBC QN AUTO: 33.7 PG (ref 26–34)
MCHC RBC AUTO-ENTMCNC: 32.7 G/DL (ref 30–36.5)
MCV RBC AUTO: 103 FL (ref 80–99)
MONOCYTES # BLD: 0.86 K/UL (ref 0–1)
MONOCYTES NFR BLD: 8.5 % (ref 5–13)
NEUTS SEG # BLD: 7.16 K/UL (ref 1.8–8)
NEUTS SEG NFR BLD: 70.7 % (ref 32–75)
NRBC # BLD: 0 K/UL (ref 0–0.01)
NRBC BLD-RTO: 0 PER 100 WBC
PLATELET # BLD AUTO: 300 K/UL (ref 150–400)
PMV BLD AUTO: 10.4 FL (ref 8.9–12.9)
POTASSIUM SERPL-SCNC: 3.6 MMOL/L (ref 3.5–5.1)
PROT SERPL-MCNC: 6.1 G/DL (ref 6.4–8.2)
RBC # BLD AUTO: 3 M/UL (ref 3.8–5.2)
SODIUM SERPL-SCNC: 136 MMOL/L (ref 136–145)
WBC # BLD AUTO: 10.1 K/UL (ref 3.6–11)

## 2025-05-15 PROCEDURE — 83735 ASSAY OF MAGNESIUM: CPT

## 2025-05-15 PROCEDURE — 6370000000 HC RX 637 (ALT 250 FOR IP): Performed by: INTERNAL MEDICINE

## 2025-05-15 PROCEDURE — 6370000000 HC RX 637 (ALT 250 FOR IP): Performed by: NURSE PRACTITIONER

## 2025-05-15 PROCEDURE — 2500000003 HC RX 250 WO HCPCS: Performed by: NURSE PRACTITIONER

## 2025-05-15 PROCEDURE — 2060000000 HC ICU INTERMEDIATE R&B

## 2025-05-15 PROCEDURE — 6370000000 HC RX 637 (ALT 250 FOR IP): Performed by: STUDENT IN AN ORGANIZED HEALTH CARE EDUCATION/TRAINING PROGRAM

## 2025-05-15 PROCEDURE — 80053 COMPREHEN METABOLIC PANEL: CPT

## 2025-05-15 PROCEDURE — 85025 COMPLETE CBC W/AUTO DIFF WBC: CPT

## 2025-05-15 PROCEDURE — 36415 COLL VENOUS BLD VENIPUNCTURE: CPT

## 2025-05-15 RX ADMIN — OXYCODONE 5 MG: 5 TABLET ORAL at 10:07

## 2025-05-15 RX ADMIN — Medication 600 MG: at 21:29

## 2025-05-15 RX ADMIN — OXYCODONE 5 MG: 5 TABLET ORAL at 23:18

## 2025-05-15 RX ADMIN — SODIUM CHLORIDE, PRESERVATIVE FREE 10 ML: 5 INJECTION INTRAVENOUS at 08:24

## 2025-05-15 RX ADMIN — Medication 600 MG: at 13:35

## 2025-05-15 RX ADMIN — PANTOPRAZOLE SODIUM 40 MG: 40 TABLET, DELAYED RELEASE ORAL at 06:03

## 2025-05-15 RX ADMIN — Medication 1 TABLET: at 13:35

## 2025-05-15 RX ADMIN — URSODIOL 300 MG: 300 CAPSULE ORAL at 21:29

## 2025-05-15 RX ADMIN — SODIUM CHLORIDE, PRESERVATIVE FREE 10 ML: 5 INJECTION INTRAVENOUS at 21:34

## 2025-05-15 RX ADMIN — Medication 1 AMPULE: at 10:03

## 2025-05-15 RX ADMIN — URSODIOL 300 MG: 300 CAPSULE ORAL at 08:23

## 2025-05-15 RX ADMIN — ALPRAZOLAM 0.25 MG: 0.25 TABLET ORAL at 03:19

## 2025-05-15 RX ADMIN — Medication 3000 UNITS: at 13:35

## 2025-05-15 RX ADMIN — OXYCODONE 5 MG: 5 TABLET ORAL at 15:15

## 2025-05-15 RX ADMIN — OXYCODONE 5 MG: 5 TABLET ORAL at 04:19

## 2025-05-15 RX ADMIN — ALPRAZOLAM 0.25 MG: 0.25 TABLET ORAL at 21:29

## 2025-05-15 ASSESSMENT — PAIN DESCRIPTION - PAIN TYPE: TYPE: ACUTE PAIN

## 2025-05-15 ASSESSMENT — PAIN DESCRIPTION - INTENSITY: RATING_2: 8

## 2025-05-15 ASSESSMENT — PAIN DESCRIPTION - ORIENTATION
ORIENTATION_2: RIGHT
ORIENTATION: RIGHT;UPPER
ORIENTATION: RIGHT
ORIENTATION: RIGHT;UPPER

## 2025-05-15 ASSESSMENT — PAIN SCALES - GENERAL
PAINLEVEL_OUTOF10: 0
PAINLEVEL_OUTOF10: 8
PAINLEVEL_OUTOF10: 0
PAINLEVEL_OUTOF10: 0
PAINLEVEL_OUTOF10: 8

## 2025-05-15 ASSESSMENT — PAIN DESCRIPTION - DESCRIPTORS
DESCRIPTORS: DISCOMFORT;OTHER (COMMENT)
DESCRIPTORS_2: ACHING;SHARP;SORE
DESCRIPTORS: SORE;ACHING;SHARP

## 2025-05-15 ASSESSMENT — PAIN DESCRIPTION - LOCATION
LOCATION: ABDOMEN
LOCATION: ABDOMEN
LOCATION_2: SHOULDER
LOCATION: FLANK

## 2025-05-15 ASSESSMENT — PAIN DESCRIPTION - FREQUENCY: FREQUENCY: CONTINUOUS

## 2025-05-15 ASSESSMENT — PAIN - FUNCTIONAL ASSESSMENT
PAIN_FUNCTIONAL_ASSESSMENT: ACTIVITIES ARE NOT PREVENTED
PAIN_FUNCTIONAL_ASSESSMENT_SITE2: ACTIVITIES ARE NOT PREVENTED

## 2025-05-15 ASSESSMENT — PAIN DESCRIPTION - ONSET: ONSET: ON-GOING

## 2025-05-15 NOTE — PLAN OF CARE
Predictive Model Details          19 (Normal)  Factor Value    Calculated 5/15/2025 10:49 42% Age 45 years old    Deterioration Index Model 15% Respiratory rate 18     11% Pulse 98     9% WBC count 10.1 K/uL     7% Sodium 136 mmol/L     7% Systolic 105     4% Hematocrit abnormal (30.9 %)     3% Potassium 3.6 mmol/L     2% Pulse oximetry 94 %     0% Temperature 98.2 °F (36.8 °C)        Problem: Discharge Planning  Goal: Discharge to home or other facility with appropriate resources  Outcome: Progressing     Problem: Pain  Goal: Verbalizes/displays adequate comfort level or baseline comfort level  Outcome: Progressing     Problem: Skin/Tissue Integrity  Goal: Skin integrity remains intact  Description: 1.  Monitor for areas of redness and/or skin breakdown2.  Assess vascular access sites hourly3.  Every 4-6 hours minimum:  Change oxygen saturation probe site4.  Every 4-6 hours:  If on nasal continuous positive airway pressure, respiratory therapy assess nares and determine need for appliance change or resting period  Outcome: Progressing     Problem: Safety - Adult  Goal: Free from fall injury  Outcome: Progressing     Problem: Respiratory - Adult  Goal: Achieves optimal ventilation and oxygenation  Outcome: Progressing     Problem: Skin/Tissue Integrity - Adult  Goal: Skin integrity remains intact  Description: 1.  Monitor for areas of redness and/or skin breakdown2.  Assess vascular access sites hourly3.  Every 4-6 hours minimum:  Change oxygen saturation probe site4.  Every 4-6 hours:  If on nasal continuous positive airway pressure, respiratory therapy assess nares and determine need for appliance change or resting period  Outcome: Progressing  Goal: Oral mucous membranes remain intact  Outcome: Progressing     Problem: Musculoskeletal - Adult  Goal: Return mobility to safest level of function  Outcome: Progressing  Goal: Maintain proper alignment of affected body part  Outcome: Progressing  Goal: Return ADL

## 2025-05-16 LAB
ANION GAP SERPL CALC-SCNC: 5 MMOL/L (ref 2–12)
BUN SERPL-MCNC: 5 MG/DL (ref 6–20)
BUN/CREAT SERPL: 7 (ref 12–20)
CALCIUM SERPL-MCNC: 9.3 MG/DL (ref 8.5–10.1)
CHLORIDE SERPL-SCNC: 102 MMOL/L (ref 97–108)
CO2 SERPL-SCNC: 27 MMOL/L (ref 21–32)
CREAT SERPL-MCNC: 0.72 MG/DL (ref 0.55–1.02)
EKG ATRIAL RATE: 74 BPM
EKG DIAGNOSIS: NORMAL
EKG P AXIS: 10 DEGREES
EKG P-R INTERVAL: 190 MS
EKG Q-T INTERVAL: 416 MS
EKG QRS DURATION: 84 MS
EKG QTC CALCULATION (BAZETT): 461 MS
EKG R AXIS: -52 DEGREES
EKG T AXIS: -27 DEGREES
EKG VENTRICULAR RATE: 74 BPM
GLUCOSE SERPL-MCNC: 81 MG/DL (ref 65–100)
MAGNESIUM SERPL-MCNC: 2.3 MG/DL (ref 1.6–2.4)
POTASSIUM SERPL-SCNC: 3.9 MMOL/L (ref 3.5–5.1)
SODIUM SERPL-SCNC: 134 MMOL/L (ref 136–145)

## 2025-05-16 PROCEDURE — 6370000000 HC RX 637 (ALT 250 FOR IP): Performed by: INTERNAL MEDICINE

## 2025-05-16 PROCEDURE — 2500000003 HC RX 250 WO HCPCS: Performed by: NURSE PRACTITIONER

## 2025-05-16 PROCEDURE — 6370000000 HC RX 637 (ALT 250 FOR IP): Performed by: NURSE PRACTITIONER

## 2025-05-16 PROCEDURE — 6370000000 HC RX 637 (ALT 250 FOR IP): Performed by: STUDENT IN AN ORGANIZED HEALTH CARE EDUCATION/TRAINING PROGRAM

## 2025-05-16 PROCEDURE — 80048 BASIC METABOLIC PNL TOTAL CA: CPT

## 2025-05-16 PROCEDURE — 93005 ELECTROCARDIOGRAM TRACING: CPT | Performed by: INTERNAL MEDICINE

## 2025-05-16 PROCEDURE — 83735 ASSAY OF MAGNESIUM: CPT

## 2025-05-16 PROCEDURE — 36415 COLL VENOUS BLD VENIPUNCTURE: CPT

## 2025-05-16 PROCEDURE — 2060000000 HC ICU INTERMEDIATE R&B

## 2025-05-16 RX ORDER — POTASSIUM CHLORIDE 1500 MG/1
20 TABLET, EXTENDED RELEASE ORAL ONCE
Status: COMPLETED | OUTPATIENT
Start: 2025-05-16 | End: 2025-05-16

## 2025-05-16 RX ADMIN — OXYCODONE 5 MG: 5 TABLET ORAL at 05:04

## 2025-05-16 RX ADMIN — URSODIOL 300 MG: 300 CAPSULE ORAL at 21:07

## 2025-05-16 RX ADMIN — AMLODIPINE BESYLATE 5 MG: 5 TABLET ORAL at 09:09

## 2025-05-16 RX ADMIN — POTASSIUM CHLORIDE 20 MEQ: 1500 TABLET, EXTENDED RELEASE ORAL at 11:39

## 2025-05-16 RX ADMIN — ALPRAZOLAM 0.25 MG: 0.25 TABLET ORAL at 21:33

## 2025-05-16 RX ADMIN — Medication 3000 UNITS: at 09:09

## 2025-05-16 RX ADMIN — ALPRAZOLAM 0.25 MG: 0.25 TABLET ORAL at 13:05

## 2025-05-16 RX ADMIN — PANTOPRAZOLE SODIUM 40 MG: 40 TABLET, DELAYED RELEASE ORAL at 05:04

## 2025-05-16 RX ADMIN — Medication 1 AMPULE: at 21:08

## 2025-05-16 RX ADMIN — URSODIOL 300 MG: 300 CAPSULE ORAL at 09:09

## 2025-05-16 RX ADMIN — Medication 600 MG: at 11:39

## 2025-05-16 RX ADMIN — SODIUM CHLORIDE, PRESERVATIVE FREE 10 ML: 5 INJECTION INTRAVENOUS at 21:08

## 2025-05-16 RX ADMIN — OXYCODONE 5 MG: 5 TABLET ORAL at 16:03

## 2025-05-16 RX ADMIN — SODIUM CHLORIDE, PRESERVATIVE FREE 10 ML: 5 INJECTION INTRAVENOUS at 09:10

## 2025-05-16 RX ADMIN — Medication 1 TABLET: at 09:10

## 2025-05-16 RX ADMIN — Medication 600 MG: at 21:07

## 2025-05-16 ASSESSMENT — PAIN SCALES - GENERAL
PAINLEVEL_OUTOF10: 7
PAINLEVEL_OUTOF10: 0
PAINLEVEL_OUTOF10: 5
PAINLEVEL_OUTOF10: 0

## 2025-05-16 ASSESSMENT — PAIN DESCRIPTION - LOCATION
LOCATION: HIP
LOCATION: HIP

## 2025-05-16 ASSESSMENT — PAIN DESCRIPTION - ORIENTATION
ORIENTATION: RIGHT
ORIENTATION: RIGHT

## 2025-05-16 ASSESSMENT — PAIN DESCRIPTION - DESCRIPTORS
DESCRIPTORS: ACHING
DESCRIPTORS: ACHING

## 2025-05-16 NOTE — PLAN OF CARE
Problem: Discharge Planning  Goal: Discharge to home or other facility with appropriate resources  Outcome: Progressing     Problem: Pain  Goal: Verbalizes/displays adequate comfort level or baseline comfort level  Outcome: Progressing     Problem: Respiratory - Adult  Goal: Achieves optimal ventilation and oxygenation  Outcome: Progressing     Problem: Skin/Tissue Integrity - Adult  Goal: Skin integrity remains intact  Description: 1.  Monitor for areas of redness and/or skin breakdown2.  Assess vascular access sites hourly3.  Every 4-6 hours minimum:  Change oxygen saturation probe site4.  Every 4-6 hours:  If on nasal continuous positive airway pressure, respiratory therapy assess nares and determine need for appliance change or resting period  Outcome: Progressing  Goal: Oral mucous membranes remain intact  Outcome: Progressing

## 2025-05-17 ENCOUNTER — APPOINTMENT (OUTPATIENT)
Facility: HOSPITAL | Age: 45
DRG: 179 | End: 2025-05-17
Payer: MEDICAID

## 2025-05-17 LAB
ALBUMIN SERPL-MCNC: 2.3 G/DL (ref 3.5–5)
ALBUMIN/GLOB SERPL: 0.6 (ref 1.1–2.2)
ALP SERPL-CCNC: 74 U/L (ref 45–117)
ALT SERPL-CCNC: 66 U/L (ref 12–78)
ANION GAP SERPL CALC-SCNC: 8 MMOL/L (ref 2–12)
AST SERPL-CCNC: 17 U/L (ref 15–37)
BASOPHILS # BLD: 0.05 K/UL (ref 0–0.1)
BASOPHILS NFR BLD: 0.6 % (ref 0–1)
BILIRUB DIRECT SERPL-MCNC: 0.2 MG/DL (ref 0–0.2)
BILIRUB SERPL-MCNC: 0.9 MG/DL (ref 0.2–1)
BUN SERPL-MCNC: 5 MG/DL (ref 6–20)
BUN/CREAT SERPL: 7 (ref 12–20)
CALCIUM SERPL-MCNC: 8.7 MG/DL (ref 8.5–10.1)
CHLORIDE SERPL-SCNC: 103 MMOL/L (ref 97–108)
CO2 SERPL-SCNC: 26 MMOL/L (ref 21–32)
CREAT SERPL-MCNC: 0.68 MG/DL (ref 0.55–1.02)
DIFFERENTIAL METHOD BLD: ABNORMAL
EOSINOPHIL # BLD: 0.15 K/UL (ref 0–0.4)
EOSINOPHIL NFR BLD: 1.7 % (ref 0–7)
ERYTHROCYTE [DISTWIDTH] IN BLOOD BY AUTOMATED COUNT: 13.6 % (ref 11.5–14.5)
GLOBULIN SER CALC-MCNC: 3.6 G/DL (ref 2–4)
GLUCOSE SERPL-MCNC: 93 MG/DL (ref 65–100)
HCT VFR BLD AUTO: 29.3 % (ref 35–47)
HGB BLD-MCNC: 9.2 G/DL (ref 11.5–16)
IMM GRANULOCYTES # BLD AUTO: 0.05 K/UL (ref 0–0.04)
IMM GRANULOCYTES NFR BLD AUTO: 0.6 % (ref 0–0.5)
LYMPHOCYTES # BLD: 2.43 K/UL (ref 0.8–3.5)
LYMPHOCYTES NFR BLD: 27.4 % (ref 12–49)
MAGNESIUM SERPL-MCNC: 2.1 MG/DL (ref 1.6–2.4)
MCH RBC QN AUTO: 33 PG (ref 26–34)
MCHC RBC AUTO-ENTMCNC: 31.4 G/DL (ref 30–36.5)
MCV RBC AUTO: 105 FL (ref 80–99)
MONOCYTES # BLD: 0.94 K/UL (ref 0–1)
MONOCYTES NFR BLD: 10.6 % (ref 5–13)
NEUTS SEG # BLD: 5.26 K/UL (ref 1.8–8)
NEUTS SEG NFR BLD: 59.1 % (ref 32–75)
NRBC # BLD: 0 K/UL (ref 0–0.01)
NRBC BLD-RTO: 0 PER 100 WBC
PHOSPHATE SERPL-MCNC: 4.5 MG/DL (ref 2.6–4.7)
PLATELET # BLD AUTO: 383 K/UL (ref 150–400)
PMV BLD AUTO: 9.8 FL (ref 8.9–12.9)
POTASSIUM SERPL-SCNC: 3.6 MMOL/L (ref 3.5–5.1)
PROT SERPL-MCNC: 5.9 G/DL (ref 6.4–8.2)
RBC # BLD AUTO: 2.79 M/UL (ref 3.8–5.2)
SODIUM SERPL-SCNC: 137 MMOL/L (ref 136–145)
WBC # BLD AUTO: 8.9 K/UL (ref 3.6–11)

## 2025-05-17 PROCEDURE — 36415 COLL VENOUS BLD VENIPUNCTURE: CPT

## 2025-05-17 PROCEDURE — 80076 HEPATIC FUNCTION PANEL: CPT

## 2025-05-17 PROCEDURE — 84100 ASSAY OF PHOSPHORUS: CPT

## 2025-05-17 PROCEDURE — 6370000000 HC RX 637 (ALT 250 FOR IP): Performed by: NURSE PRACTITIONER

## 2025-05-17 PROCEDURE — 6370000000 HC RX 637 (ALT 250 FOR IP): Performed by: STUDENT IN AN ORGANIZED HEALTH CARE EDUCATION/TRAINING PROGRAM

## 2025-05-17 PROCEDURE — 6370000000 HC RX 637 (ALT 250 FOR IP): Performed by: INTERNAL MEDICINE

## 2025-05-17 PROCEDURE — 85025 COMPLETE CBC W/AUTO DIFF WBC: CPT

## 2025-05-17 PROCEDURE — A9579 GAD-BASE MR CONTRAST NOS,1ML: HCPCS | Performed by: INTERNAL MEDICINE

## 2025-05-17 PROCEDURE — 75561 CARDIAC MRI FOR MORPH W/DYE: CPT

## 2025-05-17 PROCEDURE — 83735 ASSAY OF MAGNESIUM: CPT

## 2025-05-17 PROCEDURE — 6360000004 HC RX CONTRAST MEDICATION: Performed by: INTERNAL MEDICINE

## 2025-05-17 PROCEDURE — 80048 BASIC METABOLIC PNL TOTAL CA: CPT

## 2025-05-17 PROCEDURE — 2500000003 HC RX 250 WO HCPCS: Performed by: NURSE PRACTITIONER

## 2025-05-17 PROCEDURE — 2060000000 HC ICU INTERMEDIATE R&B

## 2025-05-17 RX ORDER — POTASSIUM CHLORIDE 1500 MG/1
20 TABLET, EXTENDED RELEASE ORAL ONCE
Status: COMPLETED | OUTPATIENT
Start: 2025-05-17 | End: 2025-05-17

## 2025-05-17 RX ORDER — ALPRAZOLAM 0.5 MG
0.5 TABLET ORAL 2 TIMES DAILY PRN
Status: DISCONTINUED | OUTPATIENT
Start: 2025-05-17 | End: 2025-05-25 | Stop reason: HOSPADM

## 2025-05-17 RX ADMIN — PANTOPRAZOLE SODIUM 40 MG: 40 TABLET, DELAYED RELEASE ORAL at 05:20

## 2025-05-17 RX ADMIN — URSODIOL 300 MG: 300 CAPSULE ORAL at 08:12

## 2025-05-17 RX ADMIN — POTASSIUM CHLORIDE 20 MEQ: 1500 TABLET, EXTENDED RELEASE ORAL at 14:49

## 2025-05-17 RX ADMIN — Medication 600 MG: at 21:13

## 2025-05-17 RX ADMIN — Medication 600 MG: at 10:55

## 2025-05-17 RX ADMIN — Medication 1 TABLET: at 08:13

## 2025-05-17 RX ADMIN — OXYCODONE 5 MG: 5 TABLET ORAL at 03:33

## 2025-05-17 RX ADMIN — GADOTERIDOL 28 ML: 279.3 INJECTION, SOLUTION INTRAVENOUS at 20:36

## 2025-05-17 RX ADMIN — Medication 3000 UNITS: at 08:12

## 2025-05-17 RX ADMIN — OXYCODONE 5 MG: 5 TABLET ORAL at 23:21

## 2025-05-17 RX ADMIN — URSODIOL 300 MG: 300 CAPSULE ORAL at 21:13

## 2025-05-17 RX ADMIN — SODIUM CHLORIDE, PRESERVATIVE FREE 10 ML: 5 INJECTION INTRAVENOUS at 21:20

## 2025-05-17 RX ADMIN — ALPRAZOLAM 0.5 MG: 0.5 TABLET ORAL at 14:51

## 2025-05-17 RX ADMIN — SODIUM CHLORIDE, PRESERVATIVE FREE 10 ML: 5 INJECTION INTRAVENOUS at 10:59

## 2025-05-18 LAB
ANION GAP SERPL CALC-SCNC: 6 MMOL/L (ref 2–12)
BASOPHILS # BLD: 0.04 K/UL (ref 0–0.1)
BASOPHILS NFR BLD: 0.4 % (ref 0–1)
BUN SERPL-MCNC: 6 MG/DL (ref 6–20)
BUN/CREAT SERPL: 8 (ref 12–20)
CALCIUM SERPL-MCNC: 9.3 MG/DL (ref 8.5–10.1)
CHLORIDE SERPL-SCNC: 106 MMOL/L (ref 97–108)
CO2 SERPL-SCNC: 24 MMOL/L (ref 21–32)
CREAT SERPL-MCNC: 0.76 MG/DL (ref 0.55–1.02)
DIFFERENTIAL METHOD BLD: ABNORMAL
EOSINOPHIL # BLD: 0.15 K/UL (ref 0–0.4)
EOSINOPHIL NFR BLD: 1.6 % (ref 0–7)
ERYTHROCYTE [DISTWIDTH] IN BLOOD BY AUTOMATED COUNT: 13.6 % (ref 11.5–14.5)
GLUCOSE SERPL-MCNC: 90 MG/DL (ref 65–100)
HCT VFR BLD AUTO: 31 % (ref 35–47)
HGB BLD-MCNC: 9.9 G/DL (ref 11.5–16)
IMM GRANULOCYTES # BLD AUTO: 0.05 K/UL (ref 0–0.04)
IMM GRANULOCYTES NFR BLD AUTO: 0.5 % (ref 0–0.5)
LYMPHOCYTES # BLD: 2.52 K/UL (ref 0.8–3.5)
LYMPHOCYTES NFR BLD: 27.3 % (ref 12–49)
MAGNESIUM SERPL-MCNC: 2.2 MG/DL (ref 1.6–2.4)
MCH RBC QN AUTO: 33.1 PG (ref 26–34)
MCHC RBC AUTO-ENTMCNC: 31.9 G/DL (ref 30–36.5)
MCV RBC AUTO: 103.7 FL (ref 80–99)
MONOCYTES # BLD: 0.8 K/UL (ref 0–1)
MONOCYTES NFR BLD: 8.7 % (ref 5–13)
NEUTS SEG # BLD: 5.67 K/UL (ref 1.8–8)
NEUTS SEG NFR BLD: 61.5 % (ref 32–75)
NRBC # BLD: 0 K/UL (ref 0–0.01)
NRBC BLD-RTO: 0 PER 100 WBC
PHOSPHATE SERPL-MCNC: 4.7 MG/DL (ref 2.6–4.7)
PLATELET # BLD AUTO: 423 K/UL (ref 150–400)
PMV BLD AUTO: 9.7 FL (ref 8.9–12.9)
POTASSIUM SERPL-SCNC: 3.9 MMOL/L (ref 3.5–5.1)
RBC # BLD AUTO: 2.99 M/UL (ref 3.8–5.2)
SODIUM SERPL-SCNC: 136 MMOL/L (ref 136–145)
WBC # BLD AUTO: 9.2 K/UL (ref 3.6–11)

## 2025-05-18 PROCEDURE — 6370000000 HC RX 637 (ALT 250 FOR IP): Performed by: INTERNAL MEDICINE

## 2025-05-18 PROCEDURE — 36415 COLL VENOUS BLD VENIPUNCTURE: CPT

## 2025-05-18 PROCEDURE — 6370000000 HC RX 637 (ALT 250 FOR IP): Performed by: STUDENT IN AN ORGANIZED HEALTH CARE EDUCATION/TRAINING PROGRAM

## 2025-05-18 PROCEDURE — 2500000003 HC RX 250 WO HCPCS: Performed by: NURSE PRACTITIONER

## 2025-05-18 PROCEDURE — 80048 BASIC METABOLIC PNL TOTAL CA: CPT

## 2025-05-18 PROCEDURE — 83735 ASSAY OF MAGNESIUM: CPT

## 2025-05-18 PROCEDURE — 6370000000 HC RX 637 (ALT 250 FOR IP): Performed by: NURSE PRACTITIONER

## 2025-05-18 PROCEDURE — 2060000000 HC ICU INTERMEDIATE R&B

## 2025-05-18 PROCEDURE — 84100 ASSAY OF PHOSPHORUS: CPT

## 2025-05-18 PROCEDURE — 85025 COMPLETE CBC W/AUTO DIFF WBC: CPT

## 2025-05-18 RX ORDER — POTASSIUM CHLORIDE 1500 MG/1
20 TABLET, EXTENDED RELEASE ORAL ONCE
Status: COMPLETED | OUTPATIENT
Start: 2025-05-18 | End: 2025-05-18

## 2025-05-18 RX ADMIN — Medication 1 TABLET: at 08:57

## 2025-05-18 RX ADMIN — Medication 1 AMPULE: at 08:46

## 2025-05-18 RX ADMIN — OXYCODONE 5 MG: 5 TABLET ORAL at 08:45

## 2025-05-18 RX ADMIN — ALPRAZOLAM 0.5 MG: 0.5 TABLET ORAL at 02:19

## 2025-05-18 RX ADMIN — POLYETHYLENE GLYCOL 3350 17 G: 17 POWDER, FOR SOLUTION ORAL at 20:23

## 2025-05-18 RX ADMIN — URSODIOL 300 MG: 300 CAPSULE ORAL at 20:23

## 2025-05-18 RX ADMIN — SODIUM CHLORIDE, PRESERVATIVE FREE 10 ML: 5 INJECTION INTRAVENOUS at 20:27

## 2025-05-18 RX ADMIN — URSODIOL 300 MG: 300 CAPSULE ORAL at 08:45

## 2025-05-18 RX ADMIN — BISACODYL 10 MG: 10 SUPPOSITORY RECTAL at 22:05

## 2025-05-18 RX ADMIN — SODIUM CHLORIDE, PRESERVATIVE FREE 10 ML: 5 INJECTION INTRAVENOUS at 08:47

## 2025-05-18 RX ADMIN — ALPRAZOLAM 0.5 MG: 0.5 TABLET ORAL at 23:01

## 2025-05-18 RX ADMIN — POTASSIUM CHLORIDE 20 MEQ: 1500 TABLET, EXTENDED RELEASE ORAL at 08:57

## 2025-05-18 RX ADMIN — PANTOPRAZOLE SODIUM 40 MG: 40 TABLET, DELAYED RELEASE ORAL at 06:13

## 2025-05-18 RX ADMIN — AMLODIPINE BESYLATE 5 MG: 5 TABLET ORAL at 08:45

## 2025-05-18 RX ADMIN — Medication 600 MG: at 16:03

## 2025-05-18 RX ADMIN — Medication 3000 UNITS: at 08:45

## 2025-05-18 RX ADMIN — Medication 600 MG: at 20:23

## 2025-05-18 ASSESSMENT — PAIN DESCRIPTION - PAIN TYPE: TYPE: CHRONIC PAIN

## 2025-05-18 ASSESSMENT — PAIN DESCRIPTION - ORIENTATION: ORIENTATION: RIGHT;LEFT;LOWER;MID

## 2025-05-18 ASSESSMENT — PAIN SCALES - GENERAL
PAINLEVEL_OUTOF10: 3
PAINLEVEL_OUTOF10: 0
PAINLEVEL_OUTOF10: 6
PAINLEVEL_OUTOF10: 0
PAINLEVEL_OUTOF10: 0

## 2025-05-18 ASSESSMENT — PAIN - FUNCTIONAL ASSESSMENT: PAIN_FUNCTIONAL_ASSESSMENT: ACTIVITIES ARE NOT PREVENTED

## 2025-05-18 ASSESSMENT — PAIN DESCRIPTION - LOCATION: LOCATION: ABDOMEN;SHOULDER

## 2025-05-18 ASSESSMENT — PAIN DESCRIPTION - DESCRIPTORS: DESCRIPTORS: ACHING;DISCOMFORT

## 2025-05-18 NOTE — PLAN OF CARE
Problem: Discharge Planning  Goal: Discharge to home or other facility with appropriate resources  Outcome: Progressing     Problem: Pain  Goal: Verbalizes/displays adequate comfort level or baseline comfort level  Outcome: Progressing     Problem: Skin/Tissue Integrity  Goal: Skin integrity remains intact  Description: 1.  Monitor for areas of redness and/or skin breakdown2.  Assess vascular access sites hourly3.  Every 4-6 hours minimum:  Change oxygen saturation probe site4.  Every 4-6 hours:  If on nasal continuous positive airway pressure, respiratory therapy assess nares and determine need for appliance change or resting period  Outcome: Progressing     Problem: Safety - Adult  Goal: Free from fall injury  Outcome: Progressing     Problem: Respiratory - Adult  Goal: Achieves optimal ventilation and oxygenation  Outcome: Progressing     Problem: Skin/Tissue Integrity - Adult  Goal: Skin integrity remains intact  Description: 1.  Monitor for areas of redness and/or skin breakdown2.  Assess vascular access sites hourly3.  Every 4-6 hours minimum:  Change oxygen saturation probe site4.  Every 4-6 hours:  If on nasal continuous positive airway pressure, respiratory therapy assess nares and determine need for appliance change or resting period  Outcome: Progressing  Goal: Oral mucous membranes remain intact  Outcome: Progressing     Problem: Musculoskeletal - Adult  Goal: Return mobility to safest level of function  Outcome: Progressing  Goal: Maintain proper alignment of affected body part  Outcome: Progressing  Goal: Return ADL status to a safe level of function  Outcome: Progressing     Problem: Gastrointestinal - Adult  Goal: Minimal or absence of nausea and vomiting  Outcome: Progressing  Goal: Maintains or returns to baseline bowel function  Outcome: Progressing  Goal: Maintains adequate nutritional intake  Outcome: Progressing     Problem: Anxiety  Goal: Will report anxiety at manageable

## 2025-05-19 LAB
ANION GAP SERPL CALC-SCNC: 8 MMOL/L (ref 2–12)
BASOPHILS # BLD: 0.04 K/UL (ref 0–0.1)
BASOPHILS NFR BLD: 0.4 % (ref 0–1)
BUN SERPL-MCNC: 6 MG/DL (ref 6–20)
BUN/CREAT SERPL: 9 (ref 12–20)
CALCIUM SERPL-MCNC: 9 MG/DL (ref 8.5–10.1)
CHLORIDE SERPL-SCNC: 108 MMOL/L (ref 97–108)
CO2 SERPL-SCNC: 23 MMOL/L (ref 21–32)
CREAT SERPL-MCNC: 0.68 MG/DL (ref 0.55–1.02)
DIFFERENTIAL METHOD BLD: ABNORMAL
EOSINOPHIL # BLD: 0.14 K/UL (ref 0–0.4)
EOSINOPHIL NFR BLD: 1.4 % (ref 0–7)
ERYTHROCYTE [DISTWIDTH] IN BLOOD BY AUTOMATED COUNT: 13.2 % (ref 11.5–14.5)
GLUCOSE SERPL-MCNC: 86 MG/DL (ref 65–100)
HCT VFR BLD AUTO: 33 % (ref 35–47)
HGB BLD-MCNC: 10.4 G/DL (ref 11.5–16)
IMM GRANULOCYTES # BLD AUTO: 0.04 K/UL (ref 0–0.04)
IMM GRANULOCYTES NFR BLD AUTO: 0.4 % (ref 0–0.5)
LYMPHOCYTES # BLD: 2.37 K/UL (ref 0.8–3.5)
LYMPHOCYTES NFR BLD: 24.1 % (ref 12–49)
MAGNESIUM SERPL-MCNC: 2.3 MG/DL (ref 1.6–2.4)
MCH RBC QN AUTO: 32.7 PG (ref 26–34)
MCHC RBC AUTO-ENTMCNC: 31.5 G/DL (ref 30–36.5)
MCV RBC AUTO: 103.8 FL (ref 80–99)
MONOCYTES # BLD: 0.7 K/UL (ref 0–1)
MONOCYTES NFR BLD: 7.1 % (ref 5–13)
NEUTS SEG # BLD: 6.55 K/UL (ref 1.8–8)
NEUTS SEG NFR BLD: 66.6 % (ref 32–75)
NRBC # BLD: 0 K/UL (ref 0–0.01)
NRBC BLD-RTO: 0 PER 100 WBC
PHOSPHATE SERPL-MCNC: 4.2 MG/DL (ref 2.6–4.7)
PLATELET # BLD AUTO: 494 K/UL (ref 150–400)
PMV BLD AUTO: 9.5 FL (ref 8.9–12.9)
POTASSIUM SERPL-SCNC: 4 MMOL/L (ref 3.5–5.1)
RBC # BLD AUTO: 3.18 M/UL (ref 3.8–5.2)
SODIUM SERPL-SCNC: 139 MMOL/L (ref 136–145)
WBC # BLD AUTO: 9.8 K/UL (ref 3.6–11)

## 2025-05-19 PROCEDURE — 6370000000 HC RX 637 (ALT 250 FOR IP): Performed by: INTERNAL MEDICINE

## 2025-05-19 PROCEDURE — 80048 BASIC METABOLIC PNL TOTAL CA: CPT

## 2025-05-19 PROCEDURE — 85025 COMPLETE CBC W/AUTO DIFF WBC: CPT

## 2025-05-19 PROCEDURE — 84100 ASSAY OF PHOSPHORUS: CPT

## 2025-05-19 PROCEDURE — 83735 ASSAY OF MAGNESIUM: CPT

## 2025-05-19 PROCEDURE — 36415 COLL VENOUS BLD VENIPUNCTURE: CPT

## 2025-05-19 PROCEDURE — 2060000000 HC ICU INTERMEDIATE R&B

## 2025-05-19 PROCEDURE — 6370000000 HC RX 637 (ALT 250 FOR IP): Performed by: STUDENT IN AN ORGANIZED HEALTH CARE EDUCATION/TRAINING PROGRAM

## 2025-05-19 RX ADMIN — URSODIOL 300 MG: 300 CAPSULE ORAL at 22:07

## 2025-05-19 RX ADMIN — PANTOPRAZOLE SODIUM 40 MG: 40 TABLET, DELAYED RELEASE ORAL at 05:20

## 2025-05-19 ASSESSMENT — PAIN SCALES - GENERAL
PAINLEVEL_OUTOF10: 0
PAINLEVEL_OUTOF10: 0

## 2025-05-19 NOTE — CARE COORDINATION
Transition of Care Plan:    RUR: 8%  Prior Level of Functioning: independant  Disposition: home  JUSTINA: TBD  If SNF or IPR: Date FOC offered:   Date FOC received:   Accepting facility:   Date authorization started with reference number:   Date authorization received and expires:   Follow up appointments: PCP and specialist  DME needed: NA  Transportation at discharge: family  IM/IMM Medicare/ letter given: n/a  Is patient a [place] and connected with [facility]?    If yes, was [place] transfer form completed and [facility] notified?   Caregiver Contact: Roly Boyd (Parent)  [phone]   Discharge Caregiver contacted prior to discharge?   Care Conference needed?   Barriers to discharge: clinical improvement  English Triny JORGE HP [phone]

## 2025-05-19 NOTE — CONSULTS
INTERVENTIONAL RADIOLOGY  Consult Note      Patient:  Ruma Boyd  :  1980  Age:  45 y.o.  MRN:  [id number]    Today's Date:  2025  Admission Date:  2025  Hospital Day:  12  Consult requested by:  Jeniffer Austin MD     Reason for consult:  Patient with recent drainage catheter placement on 25 in IR for a large subcapsular hematoma. IR consulted to evaluate for possible removal    CURRENT MEDICATIONS  Current Facility-Administered Medications   Medication Dose Route Frequency Provider Last Rate Last Admin    ALPRAZolam (XANAX) tablet 0.5 mg  0.5 mg Oral BID PRN Jeniffer Austin MD   0.5 mg at 25 2301    Vitamin D (CHOLECALCIFEROL) tablet 3,000 Units  3,000 Units Oral Daily Linh Pritchard MD   3,000 Units at 25 0845    calcium citrate (CALCITRATE) tablet 600 mg  600 mg Oral BID Linh Pritchard MD   600 mg at 25    QTc Monitoring - Do NOT give QTc prolonging meds if QTc >460 msec   Other Daily Linh Pritchard MD   Given at 25 0852    Bariatric Fusion CHEW 1 tablet (Patient Supplied)  1 tablet Oral Daily with breakfast Linh Pritchard MD   1 tablet at 25 0857    pantoprazole (PROTONIX) tablet 40 mg  40 mg Oral QAM AC Cuco Rodriguez MD   40 mg at 25 0520    bisacodyl (DULCOLAX) suppository 10 mg  10 mg Rectal Daily PRN Cuco Rodriguez MD   10 mg at 25 2205    capsaicin (ZOSTRIX) 0.025 % cream   Topical BID PRN Marquita Vogt APRN - NP   Given at 25 2357    alcohol 62% (NOZIN) nasal  1 ampule  1 ampule Nasal BID Luis Alcala MD   1 ampule at 25 0846    sodium chloride flush 0.9 % injection 5-40 mL  5-40 mL IntraVENous 2 times per day Dinah Gonzalez APRN - CNP   10 mL at 25 202    sodium chloride flush 0.9 % injection 5-40 mL  5-40 mL IntraVENous PRN Dinah Gonzalez APRN - CNP   10 mL at 25 0824    oxyCODONE (ROXICODONE) immediate release tablet 5 mg  5 mg Oral Q4H PRN

## 2025-05-20 LAB
ANION GAP SERPL CALC-SCNC: 6 MMOL/L (ref 2–12)
BUN SERPL-MCNC: 9 MG/DL (ref 6–20)
BUN/CREAT SERPL: 9 (ref 12–20)
CALCIUM SERPL-MCNC: 9.4 MG/DL (ref 8.5–10.1)
CHLORIDE SERPL-SCNC: 108 MMOL/L (ref 97–108)
CO2 SERPL-SCNC: 24 MMOL/L (ref 21–32)
CREAT SERPL-MCNC: 1.02 MG/DL (ref 0.55–1.02)
GLUCOSE SERPL-MCNC: 111 MG/DL (ref 65–100)
POTASSIUM SERPL-SCNC: 3.4 MMOL/L (ref 3.5–5.1)
SODIUM SERPL-SCNC: 138 MMOL/L (ref 136–145)

## 2025-05-20 PROCEDURE — 6370000000 HC RX 637 (ALT 250 FOR IP): Performed by: STUDENT IN AN ORGANIZED HEALTH CARE EDUCATION/TRAINING PROGRAM

## 2025-05-20 PROCEDURE — 80048 BASIC METABOLIC PNL TOTAL CA: CPT

## 2025-05-20 PROCEDURE — 75561 CARDIAC MRI FOR MORPH W/DYE: CPT | Performed by: INTERNAL MEDICINE

## 2025-05-20 PROCEDURE — 6370000000 HC RX 637 (ALT 250 FOR IP): Performed by: INTERNAL MEDICINE

## 2025-05-20 PROCEDURE — 2060000000 HC ICU INTERMEDIATE R&B

## 2025-05-20 PROCEDURE — 2500000003 HC RX 250 WO HCPCS: Performed by: NURSE PRACTITIONER

## 2025-05-20 PROCEDURE — 6370000000 HC RX 637 (ALT 250 FOR IP): Performed by: NURSE PRACTITIONER

## 2025-05-20 PROCEDURE — 36415 COLL VENOUS BLD VENIPUNCTURE: CPT

## 2025-05-20 RX ORDER — PALONOSETRON 0.05 MG/ML
0.25 INJECTION, SOLUTION INTRAVENOUS
Status: DISCONTINUED | OUTPATIENT
Start: 2025-05-20 | End: 2025-05-25 | Stop reason: HOSPADM

## 2025-05-20 RX ORDER — POLYETHYLENE GLYCOL 3350 17 G/17G
17 POWDER, FOR SOLUTION ORAL DAILY
Status: DISCONTINUED | OUTPATIENT
Start: 2025-05-21 | End: 2025-05-25 | Stop reason: HOSPADM

## 2025-05-20 RX ORDER — PALONOSETRON 0.05 MG/ML
0.25 INJECTION, SOLUTION INTRAVENOUS ONCE
Status: DISCONTINUED | OUTPATIENT
Start: 2025-05-20 | End: 2025-05-25 | Stop reason: HOSPADM

## 2025-05-20 RX ORDER — ONDANSETRON 2 MG/ML
INJECTION INTRAMUSCULAR; INTRAVENOUS
Status: DISPENSED
Start: 2025-05-20 | End: 2025-05-20

## 2025-05-20 RX ORDER — POTASSIUM CHLORIDE 1500 MG/1
40 TABLET, EXTENDED RELEASE ORAL 2 TIMES DAILY WITH MEALS
Status: COMPLETED | OUTPATIENT
Start: 2025-05-20 | End: 2025-05-21

## 2025-05-20 RX ADMIN — Medication 3000 UNITS: at 08:56

## 2025-05-20 RX ADMIN — AMLODIPINE BESYLATE 5 MG: 5 TABLET ORAL at 08:56

## 2025-05-20 RX ADMIN — ALPRAZOLAM 0.5 MG: 0.5 TABLET ORAL at 21:51

## 2025-05-20 RX ADMIN — SODIUM CHLORIDE, PRESERVATIVE FREE 10 ML: 5 INJECTION INTRAVENOUS at 00:22

## 2025-05-20 RX ADMIN — Medication 600 MG: at 11:45

## 2025-05-20 RX ADMIN — URSODIOL 300 MG: 300 CAPSULE ORAL at 21:50

## 2025-05-20 RX ADMIN — Medication 1 TABLET: at 08:57

## 2025-05-20 RX ADMIN — POTASSIUM CHLORIDE 40 MEQ: 1500 TABLET, EXTENDED RELEASE ORAL at 16:36

## 2025-05-20 RX ADMIN — SODIUM CHLORIDE, PRESERVATIVE FREE 10 ML: 5 INJECTION INTRAVENOUS at 21:54

## 2025-05-20 RX ADMIN — Medication 600 MG: at 21:51

## 2025-05-20 RX ADMIN — SODIUM CHLORIDE, PRESERVATIVE FREE 10 ML: 5 INJECTION INTRAVENOUS at 08:58

## 2025-05-20 RX ADMIN — ALPRAZOLAM 0.5 MG: 0.5 TABLET ORAL at 00:21

## 2025-05-20 RX ADMIN — URSODIOL 300 MG: 300 CAPSULE ORAL at 08:57

## 2025-05-20 RX ADMIN — POLYETHYLENE GLYCOL 3350 17 G: 17 POWDER, FOR SOLUTION ORAL at 15:35

## 2025-05-20 RX ADMIN — PANTOPRAZOLE SODIUM 40 MG: 40 TABLET, DELAYED RELEASE ORAL at 06:20

## 2025-05-20 ASSESSMENT — PAIN SCALES - GENERAL: PAINLEVEL_OUTOF10: 0

## 2025-05-21 LAB
ANION GAP SERPL CALC-SCNC: 4 MMOL/L (ref 2–12)
BUN SERPL-MCNC: 8 MG/DL (ref 6–20)
BUN/CREAT SERPL: 9 (ref 12–20)
CALCIUM SERPL-MCNC: 9.4 MG/DL (ref 8.5–10.1)
CHLORIDE SERPL-SCNC: 109 MMOL/L (ref 97–108)
CO2 SERPL-SCNC: 25 MMOL/L (ref 21–32)
CREAT SERPL-MCNC: 0.87 MG/DL (ref 0.55–1.02)
GLUCOSE SERPL-MCNC: 96 MG/DL (ref 65–100)
POTASSIUM SERPL-SCNC: 4.5 MMOL/L (ref 3.5–5.1)
SODIUM SERPL-SCNC: 138 MMOL/L (ref 136–145)

## 2025-05-21 PROCEDURE — 6370000000 HC RX 637 (ALT 250 FOR IP): Performed by: STUDENT IN AN ORGANIZED HEALTH CARE EDUCATION/TRAINING PROGRAM

## 2025-05-21 PROCEDURE — 36415 COLL VENOUS BLD VENIPUNCTURE: CPT

## 2025-05-21 PROCEDURE — 6370000000 HC RX 637 (ALT 250 FOR IP): Performed by: INTERNAL MEDICINE

## 2025-05-21 PROCEDURE — 80048 BASIC METABOLIC PNL TOTAL CA: CPT

## 2025-05-21 PROCEDURE — 2060000000 HC ICU INTERMEDIATE R&B

## 2025-05-21 PROCEDURE — 97165 OT EVAL LOW COMPLEX 30 MIN: CPT

## 2025-05-21 PROCEDURE — 97116 GAIT TRAINING THERAPY: CPT

## 2025-05-21 PROCEDURE — 97161 PT EVAL LOW COMPLEX 20 MIN: CPT

## 2025-05-21 PROCEDURE — 2500000003 HC RX 250 WO HCPCS: Performed by: NURSE PRACTITIONER

## 2025-05-21 PROCEDURE — 97535 SELF CARE MNGMENT TRAINING: CPT

## 2025-05-21 RX ADMIN — ACETAMINOPHEN 650 MG: 325 TABLET ORAL at 13:46

## 2025-05-21 RX ADMIN — SODIUM CHLORIDE, PRESERVATIVE FREE 10 ML: 5 INJECTION INTRAVENOUS at 22:01

## 2025-05-21 RX ADMIN — ALPRAZOLAM 0.5 MG: 0.5 TABLET ORAL at 22:53

## 2025-05-21 RX ADMIN — URSODIOL 300 MG: 300 CAPSULE ORAL at 22:01

## 2025-05-21 RX ADMIN — Medication 600 MG: at 09:16

## 2025-05-21 RX ADMIN — POLYETHYLENE GLYCOL 3350 17 G: 17 POWDER, FOR SOLUTION ORAL at 09:17

## 2025-05-21 RX ADMIN — AMLODIPINE BESYLATE 5 MG: 5 TABLET ORAL at 09:16

## 2025-05-21 RX ADMIN — BISACODYL 10 MG: 10 SUPPOSITORY RECTAL at 22:01

## 2025-05-21 RX ADMIN — PANTOPRAZOLE SODIUM 40 MG: 40 TABLET, DELAYED RELEASE ORAL at 06:38

## 2025-05-21 RX ADMIN — Medication 1 AMPULE: at 09:15

## 2025-05-21 RX ADMIN — URSODIOL 300 MG: 300 CAPSULE ORAL at 09:17

## 2025-05-21 RX ADMIN — SODIUM CHLORIDE, PRESERVATIVE FREE 10 ML: 5 INJECTION INTRAVENOUS at 09:19

## 2025-05-21 RX ADMIN — Medication 1 TABLET: at 09:17

## 2025-05-21 RX ADMIN — POTASSIUM CHLORIDE 40 MEQ: 1500 TABLET, EXTENDED RELEASE ORAL at 09:17

## 2025-05-21 RX ADMIN — Medication 3000 UNITS: at 09:17

## 2025-05-21 RX ADMIN — Medication 1 AMPULE: at 22:01

## 2025-05-21 ASSESSMENT — PAIN SCALES - GENERAL: PAINLEVEL_OUTOF10: 0

## 2025-05-21 NOTE — PLAN OF CARE
Problem: Physical Therapy - Adult  Goal: By Discharge: Performs mobility at highest level of function for planned discharge setting.  See evaluation for individualized goals.  Description: FUNCTIONAL STATUS PRIOR TO ADMISSION: Patient was independent and active without use of DME.    HOME SUPPORT PRIOR TO ADMISSION: Pt was planning to move out and likely stay with family at discharge.    Physical Therapy Goals  Initiated 5/21/2025  1.  Patient will move from supine to sit and sit to supine in bed with independence within 7 day(s).    2.  Patient will perform sit to stand with independence within 7 day(s).  3.  Patient will transfer from bed to chair and chair to bed with independence using the least restrictive device within 7 day(s).  4.  Patient will ambulate with independence for 200 feet with the least restrictive device within 7 day(s).   5.  Patient will ascend/descend 5 stairs with 1 handrail(s) with modified independence within 7 day(s).    Outcome: Progressing     PHYSICAL THERAPY EVALUATION    Patient: Ruma Boyd (45 y.o. female)  Date: 5/21/2025  Primary Diagnosis: Arrhythmia [I49.9]  Abnormal EKG [R94.31]  Intractable vomiting [R11.10]  Procedure(s) (LRB):  Insert ICD dual (N/A)     Precautions: Restrictions/Precautions  Restrictions/Precautions: Fall Risk            ASSESSMENT :   DEFICITS/IMPAIRMENTS:   The patient is limited by decreased balance     Based on the impairments listed above pt is at her functional baseline but placed for ICD placement tomorrow. Educated pt on precautions and practiced moving while maintaining. Noted a few small loss of balance which pt reports is her baseline.     Patient will benefit from skilled intervention to address the above impairments.    Functional Outcome Measure:  The patient scored 23/24 on the [facility] outcome measure which is indicative of good functional mobility.           PLAN :  Recommendations and Planned Interventions:   bed mobility training,

## 2025-05-21 NOTE — PLAN OF CARE
Problem: Discharge Planning  Goal: Discharge to home or other facility with appropriate resources  5/20/2025 2005 by Evita Bentley RN  Outcome: Progressing  5/20/2025 1005 by Harinder Ho RN  Outcome: Progressing     Problem: Pain  Goal: Verbalizes/displays adequate comfort level or baseline comfort level  5/20/2025 2005 by Evita Bentley RN  Outcome: Progressing  5/20/2025 1005 by Harinder Ho RN  Outcome: Progressing     Problem: Skin/Tissue Integrity  Goal: Skin integrity remains intact  Description: 1.  Monitor for areas of redness and/or skin breakdown2.  Assess vascular access sites hourly3.  Every 4-6 hours minimum:  Change oxygen saturation probe site4.  Every 4-6 hours:  If on nasal continuous positive airway pressure, respiratory therapy assess nares and determine need for appliance change or resting period  5/20/2025 2005 by Evita Bentley RN  Outcome: Progressing  5/20/2025 1005 by Harinder Ho RN  Outcome: Progressing     Problem: Safety - Adult  Goal: Free from fall injury  5/20/2025 2005 by Evita Bentley RN  Outcome: Progressing  5/20/2025 1005 by Harinder Ho RN  Outcome: Progressing     Problem: Respiratory - Adult  Goal: Achieves optimal ventilation and oxygenation  5/20/2025 2005 by Evita Bentley RN  Outcome: Progressing  5/20/2025 1005 by Harinder Ho RN  Outcome: Progressing     Problem: Skin/Tissue Integrity - Adult  Goal: Skin integrity remains intact  Description: 1.  Monitor for areas of redness and/or skin breakdown2.  Assess vascular access sites hourly3.  Every 4-6 hours minimum:  Change oxygen saturation probe site4.  Every 4-6 hours:  If on nasal continuous positive airway pressure, respiratory therapy assess nares and determine need for appliance change or resting period  5/20/2025 2005 by Evita Bentley RN  Outcome: Progressing  5/20/2025 1005 by Harinder Ho RN  Outcome: Progressing  Goal: Oral mucous membranes remain

## 2025-05-22 ENCOUNTER — APPOINTMENT (OUTPATIENT)
Facility: HOSPITAL | Age: 45
DRG: 179 | End: 2025-05-22
Payer: MEDICAID

## 2025-05-22 ENCOUNTER — ANESTHESIA (OUTPATIENT)
Facility: HOSPITAL | Age: 45
End: 2025-05-22
Payer: MEDICAID

## 2025-05-22 ENCOUNTER — ANESTHESIA EVENT (OUTPATIENT)
Facility: HOSPITAL | Age: 45
End: 2025-05-22
Payer: MEDICAID

## 2025-05-22 LAB
ANION GAP SERPL CALC-SCNC: 5 MMOL/L (ref 2–12)
BASOPHILS # BLD: 0.06 K/UL (ref 0–0.1)
BASOPHILS NFR BLD: 0.5 % (ref 0–1)
BUN SERPL-MCNC: 8 MG/DL (ref 6–20)
BUN/CREAT SERPL: 9 (ref 12–20)
CALCIUM SERPL-MCNC: 9.5 MG/DL (ref 8.5–10.1)
CHLORIDE SERPL-SCNC: 110 MMOL/L (ref 97–108)
CO2 SERPL-SCNC: 23 MMOL/L (ref 21–32)
CREAT SERPL-MCNC: 0.93 MG/DL (ref 0.55–1.02)
DIFFERENTIAL METHOD BLD: ABNORMAL
ECHO BSA: 2.11 M2
EOSINOPHIL # BLD: 0.19 K/UL (ref 0–0.4)
EOSINOPHIL NFR BLD: 1.5 % (ref 0–7)
ERYTHROCYTE [DISTWIDTH] IN BLOOD BY AUTOMATED COUNT: 13.6 % (ref 11.5–14.5)
GLUCOSE SERPL-MCNC: 86 MG/DL (ref 65–100)
HCT VFR BLD AUTO: 34.2 % (ref 35–47)
HGB BLD-MCNC: 10.8 G/DL (ref 11.5–16)
IMM GRANULOCYTES # BLD AUTO: 0.08 K/UL (ref 0–0.04)
IMM GRANULOCYTES NFR BLD AUTO: 0.6 % (ref 0–0.5)
LYMPHOCYTES # BLD: 3 K/UL (ref 0.8–3.5)
LYMPHOCYTES NFR BLD: 23.8 % (ref 12–49)
MAGNESIUM SERPL-MCNC: 2.3 MG/DL (ref 1.6–2.4)
MCH RBC QN AUTO: 32.7 PG (ref 26–34)
MCHC RBC AUTO-ENTMCNC: 31.6 G/DL (ref 30–36.5)
MCV RBC AUTO: 103.6 FL (ref 80–99)
MONOCYTES # BLD: 0.71 K/UL (ref 0–1)
MONOCYTES NFR BLD: 5.6 % (ref 5–13)
NEUTS SEG # BLD: 8.57 K/UL (ref 1.8–8)
NEUTS SEG NFR BLD: 68 % (ref 32–75)
NRBC # BLD: 0 K/UL (ref 0–0.01)
NRBC BLD-RTO: 0 PER 100 WBC
PLATELET # BLD AUTO: 732 K/UL (ref 150–400)
PMV BLD AUTO: 9.6 FL (ref 8.9–12.9)
POTASSIUM SERPL-SCNC: 3.9 MMOL/L (ref 3.5–5.1)
RBC # BLD AUTO: 3.3 M/UL (ref 3.8–5.2)
SODIUM SERPL-SCNC: 138 MMOL/L (ref 136–145)
WBC # BLD AUTO: 12.6 K/UL (ref 3.6–11)

## 2025-05-22 PROCEDURE — 02H63KZ INSERTION OF DEFIBRILLATOR LEAD INTO RIGHT ATRIUM, PERCUTANEOUS APPROACH: ICD-10-PCS | Performed by: INTERNAL MEDICINE

## 2025-05-22 PROCEDURE — 6370000000 HC RX 637 (ALT 250 FOR IP): Performed by: STUDENT IN AN ORGANIZED HEALTH CARE EDUCATION/TRAINING PROGRAM

## 2025-05-22 PROCEDURE — 3700000001 HC ADD 15 MINUTES (ANESTHESIA): Performed by: INTERNAL MEDICINE

## 2025-05-22 PROCEDURE — C1777 LEAD, AICD, ENDO SINGLE COIL: HCPCS | Performed by: INTERNAL MEDICINE

## 2025-05-22 PROCEDURE — 6370000000 HC RX 637 (ALT 250 FOR IP): Performed by: INTERNAL MEDICINE

## 2025-05-22 PROCEDURE — 02HK3KZ INSERTION OF DEFIBRILLATOR LEAD INTO RIGHT VENTRICLE, PERCUTANEOUS APPROACH: ICD-10-PCS | Performed by: INTERNAL MEDICINE

## 2025-05-22 PROCEDURE — 33249 INSJ/RPLCMT DEFIB W/LEAD(S): CPT | Performed by: INTERNAL MEDICINE

## 2025-05-22 PROCEDURE — 83735 ASSAY OF MAGNESIUM: CPT

## 2025-05-22 PROCEDURE — 2500000003 HC RX 250 WO HCPCS: Performed by: INTERNAL MEDICINE

## 2025-05-22 PROCEDURE — 6360000002 HC RX W HCPCS: Performed by: INTERNAL MEDICINE

## 2025-05-22 PROCEDURE — 36415 COLL VENOUS BLD VENIPUNCTURE: CPT

## 2025-05-22 PROCEDURE — 0JH608Z INSERTION OF DEFIBRILLATOR GENERATOR INTO CHEST SUBCUTANEOUS TISSUE AND FASCIA, OPEN APPROACH: ICD-10-PCS | Performed by: INTERNAL MEDICINE

## 2025-05-22 PROCEDURE — 2060000000 HC ICU INTERMEDIATE R&B

## 2025-05-22 PROCEDURE — 2500000003 HC RX 250 WO HCPCS: Performed by: NURSE PRACTITIONER

## 2025-05-22 PROCEDURE — C1894 INTRO/SHEATH, NON-LASER: HCPCS | Performed by: INTERNAL MEDICINE

## 2025-05-22 PROCEDURE — C1721 AICD, DUAL CHAMBER: HCPCS | Performed by: INTERNAL MEDICINE

## 2025-05-22 PROCEDURE — 71045 X-RAY EXAM CHEST 1 VIEW: CPT

## 2025-05-22 PROCEDURE — 2580000003 HC RX 258

## 2025-05-22 PROCEDURE — C1892 INTRO/SHEATH,FIXED,PEEL-AWAY: HCPCS | Performed by: INTERNAL MEDICINE

## 2025-05-22 PROCEDURE — 6360000002 HC RX W HCPCS

## 2025-05-22 PROCEDURE — 80048 BASIC METABOLIC PNL TOTAL CA: CPT

## 2025-05-22 PROCEDURE — 2709999900 HC NON-CHARGEABLE SUPPLY: Performed by: INTERNAL MEDICINE

## 2025-05-22 PROCEDURE — C1769 GUIDE WIRE: HCPCS | Performed by: INTERNAL MEDICINE

## 2025-05-22 PROCEDURE — C1898 LEAD, PMKR, OTHER THAN TRANS: HCPCS | Performed by: INTERNAL MEDICINE

## 2025-05-22 PROCEDURE — 85025 COMPLETE CBC W/AUTO DIFF WBC: CPT

## 2025-05-22 PROCEDURE — 3700000000 HC ANESTHESIA ATTENDED CARE: Performed by: INTERNAL MEDICINE

## 2025-05-22 DEVICE — ICD DDPA2D4 COBALT XT DR MRI DF4 USA
Type: IMPLANTABLE DEVICE | Status: FUNCTIONAL
Brand: COBALT™ XT DR MRI SURESCAN™

## 2025-05-22 DEVICE — LEAD 5076-52 MRI US RCMCRD
Type: IMPLANTABLE DEVICE | Status: FUNCTIONAL
Brand: CAPSUREFIX NOVUS MRI™ SURESCAN®

## 2025-05-22 DEVICE — LEAD 6935M62 QUATTRO SECURE S MRI US
Type: IMPLANTABLE DEVICE | Status: FUNCTIONAL
Brand: SPRINT QUATTRO SECURE S MRI™ SURESCAN™

## 2025-05-22 RX ORDER — SODIUM CHLORIDE, SODIUM LACTATE, POTASSIUM CHLORIDE, CALCIUM CHLORIDE 600; 310; 30; 20 MG/100ML; MG/100ML; MG/100ML; MG/100ML
INJECTION, SOLUTION INTRAVENOUS
Status: DISCONTINUED | OUTPATIENT
Start: 2025-05-22 | End: 2025-05-22 | Stop reason: SDUPTHER

## 2025-05-22 RX ORDER — SODIUM CHLORIDE 0.9 % (FLUSH) 0.9 %
5-40 SYRINGE (ML) INJECTION PRN
Status: DISCONTINUED | OUTPATIENT
Start: 2025-05-22 | End: 2025-05-25 | Stop reason: HOSPADM

## 2025-05-22 RX ORDER — SODIUM CHLORIDE 0.9 % (FLUSH) 0.9 %
5-40 SYRINGE (ML) INJECTION EVERY 12 HOURS SCHEDULED
Status: DISCONTINUED | OUTPATIENT
Start: 2025-05-22 | End: 2025-05-25 | Stop reason: HOSPADM

## 2025-05-22 RX ORDER — MIDAZOLAM HYDROCHLORIDE 1 MG/ML
INJECTION, SOLUTION INTRAMUSCULAR; INTRAVENOUS
Status: DISCONTINUED | OUTPATIENT
Start: 2025-05-22 | End: 2025-05-22 | Stop reason: SDUPTHER

## 2025-05-22 RX ORDER — PHENYLEPHRINE HCL IN 0.9% NACL 0.4MG/10ML
SYRINGE (ML) INTRAVENOUS
Status: DISCONTINUED | OUTPATIENT
Start: 2025-05-22 | End: 2025-05-22 | Stop reason: SDUPTHER

## 2025-05-22 RX ORDER — FENTANYL CITRATE 50 UG/ML
INJECTION, SOLUTION INTRAMUSCULAR; INTRAVENOUS
Status: DISCONTINUED | OUTPATIENT
Start: 2025-05-22 | End: 2025-05-22 | Stop reason: SDUPTHER

## 2025-05-22 RX ORDER — DEXAMETHASONE SODIUM PHOSPHATE 4 MG/ML
INJECTION, SOLUTION INTRA-ARTICULAR; INTRALESIONAL; INTRAMUSCULAR; INTRAVENOUS; SOFT TISSUE
Status: DISCONTINUED | OUTPATIENT
Start: 2025-05-22 | End: 2025-05-22 | Stop reason: SDUPTHER

## 2025-05-22 RX ORDER — SODIUM CHLORIDE 9 MG/ML
INJECTION, SOLUTION INTRAVENOUS PRN
Status: DISCONTINUED | OUTPATIENT
Start: 2025-05-22 | End: 2025-05-25 | Stop reason: HOSPADM

## 2025-05-22 RX ADMIN — Medication 600 MG: at 15:58

## 2025-05-22 RX ADMIN — URSODIOL 300 MG: 300 CAPSULE ORAL at 21:27

## 2025-05-22 RX ADMIN — MIDAZOLAM HYDROCHLORIDE 2 MG: 1 INJECTION, SOLUTION INTRAMUSCULAR; INTRAVENOUS at 08:36

## 2025-05-22 RX ADMIN — Medication 1 AMPULE: at 21:28

## 2025-05-22 RX ADMIN — FENTANYL CITRATE 25 MCG: 50 INJECTION, SOLUTION INTRAMUSCULAR; INTRAVENOUS at 09:06

## 2025-05-22 RX ADMIN — SODIUM CHLORIDE, PRESERVATIVE FREE 10 ML: 5 INJECTION INTRAVENOUS at 21:27

## 2025-05-22 RX ADMIN — FENTANYL CITRATE 50 MCG: 50 INJECTION, SOLUTION INTRAMUSCULAR; INTRAVENOUS at 09:08

## 2025-05-22 RX ADMIN — URSODIOL 300 MG: 300 CAPSULE ORAL at 15:54

## 2025-05-22 RX ADMIN — AMLODIPINE BESYLATE 5 MG: 5 TABLET ORAL at 15:54

## 2025-05-22 RX ADMIN — DEXAMETHASONE SODIUM PHOSPHATE 8 MG: 4 INJECTION, SOLUTION INTRAMUSCULAR; INTRAVENOUS at 08:47

## 2025-05-22 RX ADMIN — PROPOFOL 100 MCG/KG/MIN: 10 INJECTION, EMULSION INTRAVENOUS at 08:42

## 2025-05-22 RX ADMIN — VANCOMYCIN HYDROCHLORIDE 1000 MG: 1 INJECTION, POWDER, LYOPHILIZED, FOR SOLUTION INTRAVENOUS at 08:44

## 2025-05-22 RX ADMIN — SODIUM CHLORIDE, POTASSIUM CHLORIDE, SODIUM LACTATE AND CALCIUM CHLORIDE: 600; 310; 30; 20 INJECTION, SOLUTION INTRAVENOUS at 08:34

## 2025-05-22 RX ADMIN — ALPRAZOLAM 0.5 MG: 0.5 TABLET ORAL at 23:18

## 2025-05-22 RX ADMIN — SODIUM CHLORIDE, PRESERVATIVE FREE 10 ML: 5 INJECTION INTRAVENOUS at 21:45

## 2025-05-22 RX ADMIN — Medication 40 MCG: at 09:15

## 2025-05-22 RX ADMIN — FENTANYL CITRATE 25 MCG: 50 INJECTION, SOLUTION INTRAMUSCULAR; INTRAVENOUS at 09:05

## 2025-05-22 ASSESSMENT — PAIN SCALES - GENERAL
PAINLEVEL_OUTOF10: 0
PAINLEVEL_OUTOF10: 0

## 2025-05-22 NOTE — CARE COORDINATION
Transition of Care Plan:    RUR: 7% \"low risk\"  Prior Level of Functioning: Independent with ADL's  Disposition: Home with family support   JUSTINA: 5/23  If SNF or IPR: Date FOC offered: N/A  Follow up appointments: PCP/Specialists as indicated  DME needed: None  Transportation at discharge: Family   IM/IMM Medicare/ letter given: N/A  Is patient a  and connected with [facility]? No  Caregiver Contact: Roly Boyd (parent), [phone]  Discharge Caregiver contacted prior to discharge? To be contacted by pt   Care Conference needed? Not at this time   Barriers to discharge: Cardiology     1605 PM: Chart reviewed. CM following for d/c planning. Pt to return home with family support.   No CM needs identified at this time.    JEIMY Weller  Care Management  [facility]   [phone]

## 2025-05-22 NOTE — ANESTHESIA PRE PROCEDURE
Department of Anesthesiology  Preprocedure Note       Name:  Ruma Boyd   Age:  45 y.o.  :  1980                                          MRN:  [id number]         Date:  2025      Surgeon: Surgeon(s):  Hiram Pritchard MD    Procedure: Procedure(s):  Insert ICD dual    Medications prior to admission:   Prior to Admission medications    Medication Sig Start Date End Date Taking? Authorizing Provider   diclofenac sodium (VOLTAREN) 1 % GEL Apply topically 2 times daily   Yes ProviderCanelo MD   Multiple Vitamin (MULTIVITAMIN) TABS tablet Take 1 tablet by mouth daily   Yes ProviderCanelo MD   ursodiol (ACTIGALL) 300 MG capsule Take 250 mg by mouth 2 times daily   Yes Canelo Causey MD   pantoprazole (PROTONIX) 40 MG tablet Take 1 tablet by mouth daily   Yes ProviderCanelo MD   hydrOXYzine HCl (ATARAX) 50 MG tablet Take 1 tablet by mouth every 6 hours as needed for Itching   Yes ProviderCanelo MD       Current medications:    Current Facility-Administered Medications   Medication Dose Route Frequency Provider Last Rate Last Admin    palonosetron (ALOXI) injection 0.25 mg  0.25 mg IntraVENous Once Jeniffer Austin MD        polyethylene glycol (GLYCOLAX) packet 17 g  17 g Oral Daily Jeniffer Austin MD   17 g at 25 0917    palonosetron (ALOXI) injection 0.25 mg  0.25 mg IntraVENous Once PRN Jeniffer Austin MD        ALPRAZolam (XANAX) tablet 0.5 mg  0.5 mg Oral BID PRN Jeniffer Austin MD   0.5 mg at 25 2253    Vitamin D (CHOLECALCIFEROL) tablet 3,000 Units  3,000 Units Oral Daily Linh Pritchard MD   3,000 Units at 25 0917    calcium citrate (CALCITRATE) tablet 600 mg  600 mg Oral BID Linh Pritchard MD   600 mg at 25 0916    QTc Monitoring - Do NOT give QTc prolonging meds if QTc >460 msec   Other Daily Linh Pritchard MD   Given at 25 0919    Bariatric Fusion CHEW 1 tablet (Patient Supplied)  1 tablet Oral Daily

## 2025-05-22 NOTE — PLAN OF CARE
Problem: Discharge Planning  Goal: Discharge to home or other facility with appropriate resources  5/22/2025 0810 by Melissa Lagunas, RN  Outcome: Progressing  5/21/2025 2019 by Evita Bentley RN  Outcome: Progressing     Problem: Pain  Goal: Verbalizes/displays adequate comfort level or baseline comfort level  5/22/2025 0810 by Melissa Lagunas, RN  Outcome: Progressing  5/21/2025 2019 by Evita Bentley, RN  Outcome: Progressing

## 2025-05-22 NOTE — PLAN OF CARE
Problem: Discharge Planning  Goal: Discharge to home or other facility with appropriate resources  5/22/2025 1633 by Aurora Perkins RN  Outcome: Progressing  5/22/2025 0810 by Melissa Lagunas RN  Outcome: Progressing     Problem: Pain  Goal: Verbalizes/displays adequate comfort level or baseline comfort level  5/22/2025 1633 by Aurora Perkins RN  Outcome: Progressing  5/22/2025 0810 by Melissa Lagunas RN  Outcome: Progressing     Problem: Skin/Tissue Integrity  Goal: Skin integrity remains intact  Description: 1.  Monitor for areas of redness and/or skin breakdown2.  Assess vascular access sites hourly3.  Every 4-6 hours minimum:  Change oxygen saturation probe site4.  Every 4-6 hours:  If on nasal continuous positive airway pressure, respiratory therapy assess nares and determine need for appliance change or resting period  Outcome: Progressing     Problem: Safety - Adult  Goal: Free from fall injury  Outcome: Progressing     Problem: Respiratory - Adult  Goal: Achieves optimal ventilation and oxygenation  Outcome: Progressing     Problem: Skin/Tissue Integrity - Adult  Goal: Skin integrity remains intact  Description: 1.  Monitor for areas of redness and/or skin breakdown2.  Assess vascular access sites hourly3.  Every 4-6 hours minimum:  Change oxygen saturation probe site4.  Every 4-6 hours:  If on nasal continuous positive airway pressure, respiratory therapy assess nares and determine need for appliance change or resting period  Outcome: Progressing  Goal: Oral mucous membranes remain intact  Outcome: Progressing     Problem: Musculoskeletal - Adult  Goal: Return mobility to safest level of function  Outcome: Progressing  Goal: Maintain proper alignment of affected body part  Outcome: Progressing  Goal: Return ADL status to a safe level of function  Outcome: Progressing     Problem: Gastrointestinal - Adult  Goal: Minimal or absence of nausea and vomiting  Outcome: Progressing  Goal: Maintains or returns to

## 2025-05-22 NOTE — ANESTHESIA POSTPROCEDURE EVALUATION
Department of Anesthesiology  Postprocedure Note    Patient: Ruma Boyd  MRN: [id number]  YOB: 1980  Date of evaluation: 5/22/2025    Procedure Summary       Date: 05/22/25 Room / Location: [facility] EP LAB / [facility] CARDIAC CATH LAB    Anesthesia Start: 0834 Anesthesia Stop: 1000    Procedure: Insert ICD dual Diagnosis:       Abnormal heart rhythm      (Abnormal heart rhythm [I49.9])    Providers: Hiram Pritchard MD Responsible Provider: Dandre Dejesus DO    Anesthesia Type: MAC ASA Status: 3            Anesthesia Type: MAC    Yahir Phase I:      Yahir Phase II:      Anesthesia Post Evaluation    Patient location during evaluation: PACU  Patient participation: complete - patient participated  Level of consciousness: awake and alert  Pain score: 0  Airway patency: patent  Nausea & Vomiting: no nausea  Cardiovascular status: hemodynamically stable  Respiratory status: acceptable  Hydration status: euvolemic  Comments: Seen, no complaints   Pain management: adequate    No notable events documented.

## 2025-05-23 ENCOUNTER — APPOINTMENT (OUTPATIENT)
Facility: HOSPITAL | Age: 45
DRG: 179 | End: 2025-05-23
Payer: MEDICAID

## 2025-05-23 LAB
ANION GAP SERPL CALC-SCNC: 5 MMOL/L (ref 2–12)
APPEARANCE UR: ABNORMAL
BACTERIA URNS QL MICRO: ABNORMAL /HPF
BASOPHILS # BLD: 0.02 K/UL (ref 0–0.1)
BASOPHILS NFR BLD: 0.1 % (ref 0–1)
BILIRUB UR QL CFM: NEGATIVE
BUN SERPL-MCNC: 10 MG/DL (ref 6–20)
BUN/CREAT SERPL: 12 (ref 12–20)
CALCIUM SERPL-MCNC: 9.1 MG/DL (ref 8.5–10.1)
CAOX CRY URNS QL MICRO: ABNORMAL
CHLORIDE SERPL-SCNC: 109 MMOL/L (ref 97–108)
CO2 SERPL-SCNC: 24 MMOL/L (ref 21–32)
COLOR UR: ABNORMAL
CREAT SERPL-MCNC: 0.83 MG/DL (ref 0.55–1.02)
DIFFERENTIAL METHOD BLD: ABNORMAL
EOSINOPHIL # BLD: 0.01 K/UL (ref 0–0.4)
EOSINOPHIL NFR BLD: 0.1 % (ref 0–7)
EPITH CASTS URNS QL MICRO: ABNORMAL /LPF
ERYTHROCYTE [DISTWIDTH] IN BLOOD BY AUTOMATED COUNT: 13.4 % (ref 11.5–14.5)
ERYTHROCYTE [SEDIMENTATION RATE] IN BLOOD: 65 MM/HR (ref 0–20)
GLUCOSE SERPL-MCNC: 90 MG/DL (ref 65–100)
GLUCOSE UR STRIP.AUTO-MCNC: NEGATIVE MG/DL
HCT VFR BLD AUTO: 31.2 % (ref 35–47)
HGB BLD-MCNC: 10.1 G/DL (ref 11.5–16)
HGB UR QL STRIP: ABNORMAL
IMM GRANULOCYTES # BLD AUTO: 0.1 K/UL (ref 0–0.04)
IMM GRANULOCYTES NFR BLD AUTO: 0.6 % (ref 0–0.5)
KETONES UR QL STRIP.AUTO: ABNORMAL MG/DL
LEUKOCYTE ESTERASE UR QL STRIP.AUTO: ABNORMAL
LYMPHOCYTES # BLD: 2.65 K/UL (ref 0.8–3.5)
LYMPHOCYTES NFR BLD: 15.5 % (ref 12–49)
MAGNESIUM SERPL-MCNC: 2.3 MG/DL (ref 1.6–2.4)
MCH RBC QN AUTO: 33 PG (ref 26–34)
MCHC RBC AUTO-ENTMCNC: 32.4 G/DL (ref 30–36.5)
MCV RBC AUTO: 102 FL (ref 80–99)
MONOCYTES # BLD: 0.99 K/UL (ref 0–1)
MONOCYTES NFR BLD: 5.8 % (ref 5–13)
NEUTS SEG # BLD: 13.34 K/UL (ref 1.8–8)
NEUTS SEG NFR BLD: 77.9 % (ref 32–75)
NITRITE UR QL STRIP.AUTO: NEGATIVE
NRBC # BLD: 0 K/UL (ref 0–0.01)
NRBC BLD-RTO: 0 PER 100 WBC
PH UR STRIP: 5 (ref 5–8)
PLATELET # BLD AUTO: 588 K/UL (ref 150–400)
PMV BLD AUTO: 9.3 FL (ref 8.9–12.9)
POTASSIUM SERPL-SCNC: 3.6 MMOL/L (ref 3.5–5.1)
PROCALCITONIN SERPL-MCNC: <0.05 NG/ML
PROT UR STRIP-MCNC: ABNORMAL MG/DL
RBC # BLD AUTO: 3.06 M/UL (ref 3.8–5.2)
RBC #/AREA URNS HPF: ABNORMAL /HPF (ref 0–5)
SODIUM SERPL-SCNC: 138 MMOL/L (ref 136–145)
SP GR UR REFRACTOMETRY: 1.03
URINE CULTURE IF INDICATED: ABNORMAL
UROBILINOGEN UR QL STRIP.AUTO: 1 EU/DL (ref 0.2–1)
WBC # BLD AUTO: 17.1 K/UL (ref 3.6–11)
WBC URNS QL MICRO: ABNORMAL /HPF (ref 0–4)

## 2025-05-23 PROCEDURE — 6370000000 HC RX 637 (ALT 250 FOR IP): Performed by: INTERNAL MEDICINE

## 2025-05-23 PROCEDURE — 83735 ASSAY OF MAGNESIUM: CPT

## 2025-05-23 PROCEDURE — 81001 URINALYSIS AUTO W/SCOPE: CPT

## 2025-05-23 PROCEDURE — 85025 COMPLETE CBC W/AUTO DIFF WBC: CPT

## 2025-05-23 PROCEDURE — 87040 BLOOD CULTURE FOR BACTERIA: CPT

## 2025-05-23 PROCEDURE — 85652 RBC SED RATE AUTOMATED: CPT

## 2025-05-23 PROCEDURE — 80048 BASIC METABOLIC PNL TOTAL CA: CPT

## 2025-05-23 PROCEDURE — 71045 X-RAY EXAM CHEST 1 VIEW: CPT

## 2025-05-23 PROCEDURE — 2500000003 HC RX 250 WO HCPCS: Performed by: NURSE PRACTITIONER

## 2025-05-23 PROCEDURE — 36415 COLL VENOUS BLD VENIPUNCTURE: CPT

## 2025-05-23 PROCEDURE — 84145 PROCALCITONIN (PCT): CPT

## 2025-05-23 PROCEDURE — 6370000000 HC RX 637 (ALT 250 FOR IP): Performed by: STUDENT IN AN ORGANIZED HEALTH CARE EDUCATION/TRAINING PROGRAM

## 2025-05-23 PROCEDURE — 6370000000 HC RX 637 (ALT 250 FOR IP): Performed by: NURSE PRACTITIONER

## 2025-05-23 PROCEDURE — 74176 CT ABD & PELVIS W/O CONTRAST: CPT

## 2025-05-23 PROCEDURE — 2060000000 HC ICU INTERMEDIATE R&B

## 2025-05-23 PROCEDURE — 87086 URINE CULTURE/COLONY COUNT: CPT

## 2025-05-23 PROCEDURE — 2500000003 HC RX 250 WO HCPCS: Performed by: INTERNAL MEDICINE

## 2025-05-23 RX ORDER — SUMATRIPTAN SUCCINATE 25 MG/1
50 TABLET ORAL ONCE
Status: COMPLETED | OUTPATIENT
Start: 2025-05-23 | End: 2025-05-23

## 2025-05-23 RX ORDER — DEXAMETHASONE SODIUM PHOSPHATE 4 MG/ML
4 INJECTION, SOLUTION INTRA-ARTICULAR; INTRALESIONAL; INTRAMUSCULAR; INTRAVENOUS; SOFT TISSUE ONCE
Status: DISCONTINUED | OUTPATIENT
Start: 2025-05-23 | End: 2025-05-23

## 2025-05-23 RX ORDER — BUTALBITAL, ACETAMINOPHEN AND CAFFEINE 50; 325; 40 MG/1; MG/1; MG/1
1 TABLET ORAL EVERY 4 HOURS PRN
Status: DISCONTINUED | OUTPATIENT
Start: 2025-05-23 | End: 2025-05-25 | Stop reason: HOSPADM

## 2025-05-23 RX ADMIN — ACETAMINOPHEN 650 MG: 325 TABLET ORAL at 06:48

## 2025-05-23 RX ADMIN — PANTOPRAZOLE SODIUM 40 MG: 40 TABLET, DELAYED RELEASE ORAL at 06:48

## 2025-05-23 RX ADMIN — Medication 1 TABLET: at 09:47

## 2025-05-23 RX ADMIN — ALPRAZOLAM 0.5 MG: 0.5 TABLET ORAL at 22:41

## 2025-05-23 RX ADMIN — ACETAMINOPHEN 650 MG: 325 TABLET ORAL at 12:24

## 2025-05-23 RX ADMIN — SUMATRIPTAN SUCCINATE 50 MG: 25 TABLET ORAL at 14:12

## 2025-05-23 RX ADMIN — OXYCODONE 5 MG: 5 TABLET ORAL at 15:28

## 2025-05-23 RX ADMIN — SODIUM CHLORIDE, PRESERVATIVE FREE 10 ML: 5 INJECTION INTRAVENOUS at 09:46

## 2025-05-23 RX ADMIN — AMLODIPINE BESYLATE 5 MG: 5 TABLET ORAL at 09:45

## 2025-05-23 RX ADMIN — OXYCODONE 5 MG: 5 TABLET ORAL at 20:57

## 2025-05-23 RX ADMIN — POLYETHYLENE GLYCOL 3350 17 G: 17 POWDER, FOR SOLUTION ORAL at 09:45

## 2025-05-23 RX ADMIN — BUTALBITAL, ACETAMINOPHEN, AND CAFFEINE 1 TABLET: 325; 50; 40 TABLET ORAL at 19:24

## 2025-05-23 RX ADMIN — SODIUM CHLORIDE, PRESERVATIVE FREE 10 ML: 5 INJECTION INTRAVENOUS at 20:51

## 2025-05-23 RX ADMIN — SODIUM CHLORIDE, PRESERVATIVE FREE 20 ML: 5 INJECTION INTRAVENOUS at 09:46

## 2025-05-23 RX ADMIN — URSODIOL 300 MG: 300 CAPSULE ORAL at 20:48

## 2025-05-23 RX ADMIN — ALPRAZOLAM 0.5 MG: 0.5 TABLET ORAL at 13:37

## 2025-05-23 RX ADMIN — URSODIOL 300 MG: 300 CAPSULE ORAL at 09:45

## 2025-05-23 ASSESSMENT — PAIN DESCRIPTION - ORIENTATION
ORIENTATION: POSTERIOR
ORIENTATION: RIGHT;LOWER
ORIENTATION: LEFT
ORIENTATION: ANTERIOR
ORIENTATION: RIGHT;LOWER

## 2025-05-23 ASSESSMENT — PAIN SCALES - GENERAL
PAINLEVEL_OUTOF10: 10
PAINLEVEL_OUTOF10: 7
PAINLEVEL_OUTOF10: 8
PAINLEVEL_OUTOF10: 10
PAINLEVEL_OUTOF10: 6
PAINLEVEL_OUTOF10: 8
PAINLEVEL_OUTOF10: 3
PAINLEVEL_OUTOF10: 7
PAINLEVEL_OUTOF10: 7
PAINLEVEL_OUTOF10: 8
PAINLEVEL_OUTOF10: 0

## 2025-05-23 ASSESSMENT — PAIN DESCRIPTION - LOCATION
LOCATION: HEAD
LOCATION: INCISION
LOCATION: ABDOMEN
LOCATION: ABDOMEN
LOCATION: HEAD
LOCATION: HEAD
LOCATION: HEAD;NECK
LOCATION: ABDOMEN
LOCATION: HEAD;NECK

## 2025-05-23 ASSESSMENT — PAIN DESCRIPTION - DESCRIPTORS
DESCRIPTORS: ACHING
DESCRIPTORS: DISCOMFORT
DESCRIPTORS: SORE
DESCRIPTORS: ACHING
DESCRIPTORS: ACHING;SQUEEZING;THROBBING
DESCRIPTORS: ACHING
DESCRIPTORS: ACHING;TENDER;DISCOMFORT
DESCRIPTORS: ACHING;SQUEEZING;THROBBING

## 2025-05-23 ASSESSMENT — PAIN DESCRIPTION - PAIN TYPE
TYPE: ACUTE PAIN
TYPE: SURGICAL PAIN

## 2025-05-23 ASSESSMENT — PAIN - FUNCTIONAL ASSESSMENT
PAIN_FUNCTIONAL_ASSESSMENT: ACTIVITIES ARE NOT PREVENTED
PAIN_FUNCTIONAL_ASSESSMENT: ACTIVITIES ARE NOT PREVENTED

## 2025-05-23 ASSESSMENT — PAIN DESCRIPTION - DIRECTION: RADIATING_TOWARDS: NO

## 2025-05-23 ASSESSMENT — PAIN DESCRIPTION - FREQUENCY: FREQUENCY: CONTINUOUS

## 2025-05-23 ASSESSMENT — PAIN DESCRIPTION - ONSET: ONSET: ON-GOING

## 2025-05-23 NOTE — PLAN OF CARE
Problem: Discharge Planning  Goal: Discharge to home or other facility with appropriate resources  5/23/2025 0057 by Maegan Brice RN  Outcome: Progressing  5/22/2025 1633 by Aurora Perkins RN  Outcome: Progressing     Problem: Pain  Goal: Verbalizes/displays adequate comfort level or baseline comfort level  5/23/2025 0057 by Maegan Brice RN  Outcome: Progressing  5/22/2025 1633 by Aurora Perkins RN  Outcome: Progressing     Problem: Skin/Tissue Integrity  Goal: Skin integrity remains intact  Description: 1.  Monitor for areas of redness and/or skin breakdown2.  Assess vascular access sites hourly3.  Every 4-6 hours minimum:  Change oxygen saturation probe site4.  Every 4-6 hours:  If on nasal continuous positive airway pressure, respiratory therapy assess nares and determine need for appliance change or resting period  5/23/2025 0057 by Maegan Brice RN  Outcome: Progressing  5/22/2025 1633 by Aurora Perkins RN  Outcome: Progressing     Problem: Safety - Adult  Goal: Free from fall injury  5/23/2025 0057 by Maegan Brice RN  Outcome: Progressing  5/22/2025 1633 by Aurora Perkins RN  Outcome: Progressing     Problem: Respiratory - Adult  Goal: Achieves optimal ventilation and oxygenation  5/23/2025 0057 by Maegan Brice RN  Outcome: Progressing  5/22/2025 1633 by Aurora Perkins RN  Outcome: Progressing     Problem: Skin/Tissue Integrity - Adult  Goal: Skin integrity remains intact  Description: 1.  Monitor for areas of redness and/or skin breakdown2.  Assess vascular access sites hourly3.  Every 4-6 hours minimum:  Change oxygen saturation probe site4.  Every 4-6 hours:  If on nasal continuous positive airway pressure, respiratory therapy assess nares and determine need for appliance change or resting period  5/23/2025 0057 by Maegan Brice RN  Outcome: Progressing  5/22/2025 1633 by Aurora Perkins RN  Outcome: Progressing  Goal: Oral mucous membranes remain intact  5/23/2025 0057 by

## 2025-05-23 NOTE — CARE COORDINATION
Transition of Care Plan:    RUR: 7% \"low risk\"  Prior Level of Functioning: Independent with ADL's  Disposition: Home with family support   JUSTINA: 5/23  If SNF or IPR: Date FOC offered: N/A  Follow up appointments: PCP/Specialists as indicated  DME needed: None  Transportation at discharge: Family   IM/IMM Medicare/ letter given: N/A  Is patient a  and connected with VA? No  Caregiver Contact: Roly Boyd (parent), [phone]  Discharge Caregiver contacted prior to discharge? To be contacted by pt   Care Conference needed? Not at this time   Barriers to discharge: Cardiology     0922 AM: Chart reviewed. CM following for d/c planning. Pt to return home with family support.   No CM needs identified at this time.    JEIMY Weller  Care Management  [facility]   [phone]

## 2025-05-23 NOTE — DISCHARGE INSTRUCTIONS
DISCHARGE INSTRUCTIONS FOR THE ICD    You had a dual chamber ICD implanted to protect you against recurrent ventricular arrhythmia.    1. Remember to call for an appointment in 2-4 weeks [phone] to check healing and implant programming with Dr. Pritchard's nurse, CLARENCE.  2. Medic Alert Bracelets are available from your pharmacist to wear at all times if you choose to wear one.  3. Carry your ID card for your ICD with you at all times.  This card will be given to you in the hospital or mailed to you.  4. The ICD will bulge slightly under your skin.  The bulge will decrease in size over the next few weeks.  Please notify the doctor's office if you notice any of the following around your ICD site:   A.  A bruise that does not go away.   B.  Soreness or yellow, green, or brown drainage from the site.   C.  Any swelling from the site.   D.  If you have a fever of 100 degrees or higher that lasts for a few days.    INCISION CARE     1.  Leave the skin glue over your site until it dissolves on its own, usually in a few weeks.  2.  You may shower after 3 days as long as your incision isn’t submerged or directly sprayed upon until well healed.  3.  For comfort, wear loose fitting clothing.  4.  Report any signs of infection, fever, pain, swelling, redness, oozing, or heat at site especially if these symptoms increase after the first 3 to 4 days.    ACTIVITY PRECAUTIONS  1. Avoid rough contact with the implant site.  2. No driving for 14 days.  3. Avoid lifting your arm over your head, carrying anything on the affected side, or lifting over 10 pounds for 30 days.  For the first 2 days only bend your arm at the elbow.  4. Any extreme activity such as golf, weight lifting or exercise biking should be restricted for 60 days.  5. Do not carry objects by holding them against your implant site.   6.  No shooting rifles or any type of gun with the affected shoulder permanently.  7.  Welding and chainsaws are

## 2025-05-24 LAB
ALBUMIN SERPL-MCNC: 3.1 G/DL (ref 3.5–5)
ALBUMIN/GLOB SERPL: 0.9 (ref 1.1–2.2)
ALP SERPL-CCNC: 106 U/L (ref 45–117)
ALT SERPL-CCNC: 25 U/L (ref 12–78)
ANION GAP SERPL CALC-SCNC: 8 MMOL/L (ref 2–12)
APPEARANCE UR: CLEAR
AST SERPL-CCNC: 19 U/L (ref 15–37)
BACTERIA SPEC CULT: ABNORMAL
BACTERIA URNS QL MICRO: NEGATIVE /HPF
BASOPHILS # BLD: 0.05 K/UL (ref 0–0.1)
BASOPHILS NFR BLD: 0.4 % (ref 0–1)
BILIRUB SERPL-MCNC: 0.5 MG/DL (ref 0.2–1)
BILIRUB UR QL: NEGATIVE
BUN SERPL-MCNC: 11 MG/DL (ref 6–20)
BUN/CREAT SERPL: 13 (ref 12–20)
CALCIUM SERPL-MCNC: 9.2 MG/DL (ref 8.5–10.1)
CC UR VC: ABNORMAL
CHLORIDE SERPL-SCNC: 110 MMOL/L (ref 97–108)
CO2 SERPL-SCNC: 22 MMOL/L (ref 21–32)
COLOR UR: ABNORMAL
CREAT SERPL-MCNC: 0.87 MG/DL (ref 0.55–1.02)
DIFFERENTIAL METHOD BLD: ABNORMAL
EOSINOPHIL # BLD: 0.06 K/UL (ref 0–0.4)
EOSINOPHIL NFR BLD: 0.5 % (ref 0–7)
EPITH CASTS URNS QL MICRO: ABNORMAL /LPF
ERYTHROCYTE [DISTWIDTH] IN BLOOD BY AUTOMATED COUNT: 13.6 % (ref 11.5–14.5)
GLOBULIN SER CALC-MCNC: 3.5 G/DL (ref 2–4)
GLUCOSE SERPL-MCNC: 73 MG/DL (ref 65–100)
GLUCOSE UR STRIP.AUTO-MCNC: NEGATIVE MG/DL
HCT VFR BLD AUTO: 37.2 % (ref 35–47)
HGB BLD-MCNC: 11.4 G/DL (ref 11.5–16)
HGB UR QL STRIP: ABNORMAL
HYALINE CASTS URNS QL MICRO: ABNORMAL /LPF (ref 0–2)
IMM GRANULOCYTES # BLD AUTO: 0.11 K/UL (ref 0–0.04)
IMM GRANULOCYTES NFR BLD AUTO: 0.9 % (ref 0–0.5)
KETONES UR QL STRIP.AUTO: NEGATIVE MG/DL
LEUKOCYTE ESTERASE UR QL STRIP.AUTO: ABNORMAL
LYMPHOCYTES # BLD: 3.81 K/UL (ref 0.8–3.5)
LYMPHOCYTES NFR BLD: 30.7 % (ref 12–49)
MAGNESIUM SERPL-MCNC: 2.3 MG/DL (ref 1.6–2.4)
MCH RBC QN AUTO: 32.9 PG (ref 26–34)
MCHC RBC AUTO-ENTMCNC: 30.6 G/DL (ref 30–36.5)
MCV RBC AUTO: 107.5 FL (ref 80–99)
MONOCYTES # BLD: 0.68 K/UL (ref 0–1)
MONOCYTES NFR BLD: 5.5 % (ref 5–13)
NEUTS SEG # BLD: 7.7 K/UL (ref 1.8–8)
NEUTS SEG NFR BLD: 62 % (ref 32–75)
NITRITE UR QL STRIP.AUTO: NEGATIVE
NRBC # BLD: 0 K/UL (ref 0–0.01)
NRBC BLD-RTO: 0 PER 100 WBC
PH UR STRIP: 5.5 (ref 5–8)
PLATELET # BLD AUTO: 530 K/UL (ref 150–400)
PMV BLD AUTO: 9.4 FL (ref 8.9–12.9)
POTASSIUM SERPL-SCNC: 4.5 MMOL/L (ref 3.5–5.1)
PROT SERPL-MCNC: 6.6 G/DL (ref 6.4–8.2)
PROT UR STRIP-MCNC: NEGATIVE MG/DL
RBC # BLD AUTO: 3.46 M/UL (ref 3.8–5.2)
RBC #/AREA URNS HPF: ABNORMAL /HPF (ref 0–5)
SERVICE CMNT-IMP: ABNORMAL
SODIUM SERPL-SCNC: 140 MMOL/L (ref 136–145)
SP GR UR REFRACTOMETRY: 1.01
URINE CULTURE IF INDICATED: ABNORMAL
UROBILINOGEN UR QL STRIP.AUTO: 0.2 EU/DL (ref 0.2–1)
WBC # BLD AUTO: 12.4 K/UL (ref 3.6–11)
WBC URNS QL MICRO: ABNORMAL /HPF (ref 0–4)

## 2025-05-24 PROCEDURE — 2060000000 HC ICU INTERMEDIATE R&B

## 2025-05-24 PROCEDURE — 6370000000 HC RX 637 (ALT 250 FOR IP): Performed by: INTERNAL MEDICINE

## 2025-05-24 PROCEDURE — 80053 COMPREHEN METABOLIC PANEL: CPT

## 2025-05-24 PROCEDURE — 85025 COMPLETE CBC W/AUTO DIFF WBC: CPT

## 2025-05-24 PROCEDURE — 2500000003 HC RX 250 WO HCPCS: Performed by: NURSE PRACTITIONER

## 2025-05-24 PROCEDURE — 36415 COLL VENOUS BLD VENIPUNCTURE: CPT

## 2025-05-24 PROCEDURE — 6370000000 HC RX 637 (ALT 250 FOR IP): Performed by: STUDENT IN AN ORGANIZED HEALTH CARE EDUCATION/TRAINING PROGRAM

## 2025-05-24 PROCEDURE — 93005 ELECTROCARDIOGRAM TRACING: CPT | Performed by: INTERNAL MEDICINE

## 2025-05-24 PROCEDURE — 6360000002 HC RX W HCPCS: Performed by: STUDENT IN AN ORGANIZED HEALTH CARE EDUCATION/TRAINING PROGRAM

## 2025-05-24 PROCEDURE — 2500000003 HC RX 250 WO HCPCS: Performed by: INTERNAL MEDICINE

## 2025-05-24 PROCEDURE — 81001 URINALYSIS AUTO W/SCOPE: CPT

## 2025-05-24 PROCEDURE — 2580000003 HC RX 258: Performed by: STUDENT IN AN ORGANIZED HEALTH CARE EDUCATION/TRAINING PROGRAM

## 2025-05-24 PROCEDURE — 83735 ASSAY OF MAGNESIUM: CPT

## 2025-05-24 PROCEDURE — 87086 URINE CULTURE/COLONY COUNT: CPT

## 2025-05-24 RX ORDER — 0.9 % SODIUM CHLORIDE 0.9 %
1000 INTRAVENOUS SOLUTION INTRAVENOUS ONCE
Status: COMPLETED | OUTPATIENT
Start: 2025-05-24 | End: 2025-05-24

## 2025-05-24 RX ORDER — DIAZEPAM 10 MG/2ML
5 INJECTION, SOLUTION INTRAMUSCULAR; INTRAVENOUS ONCE
Status: COMPLETED | OUTPATIENT
Start: 2025-05-24 | End: 2025-05-24

## 2025-05-24 RX ORDER — KETOROLAC TROMETHAMINE 30 MG/ML
15 INJECTION, SOLUTION INTRAMUSCULAR; INTRAVENOUS EVERY 6 HOURS PRN
Status: DISCONTINUED | OUTPATIENT
Start: 2025-05-24 | End: 2025-05-25 | Stop reason: HOSPADM

## 2025-05-24 RX ADMIN — SODIUM CHLORIDE, PRESERVATIVE FREE 10 ML: 5 INJECTION INTRAVENOUS at 09:36

## 2025-05-24 RX ADMIN — ALPRAZOLAM 0.5 MG: 0.5 TABLET ORAL at 21:00

## 2025-05-24 RX ADMIN — Medication 1 AMPULE: at 12:50

## 2025-05-24 RX ADMIN — SODIUM CHLORIDE 1000 ML: 0.9 INJECTION, SOLUTION INTRAVENOUS at 00:23

## 2025-05-24 RX ADMIN — BISACODYL 10 MG: 10 SUPPOSITORY RECTAL at 20:53

## 2025-05-24 RX ADMIN — KETOROLAC TROMETHAMINE 15 MG: 30 INJECTION, SOLUTION INTRAMUSCULAR at 06:32

## 2025-05-24 RX ADMIN — Medication 3000 UNITS: at 09:33

## 2025-05-24 RX ADMIN — KETOROLAC TROMETHAMINE 15 MG: 30 INJECTION, SOLUTION INTRAMUSCULAR at 15:01

## 2025-05-24 RX ADMIN — SODIUM CHLORIDE, PRESERVATIVE FREE 10 ML: 5 INJECTION INTRAVENOUS at 20:21

## 2025-05-24 RX ADMIN — URSODIOL 300 MG: 300 CAPSULE ORAL at 09:33

## 2025-05-24 RX ADMIN — URSODIOL 300 MG: 300 CAPSULE ORAL at 20:49

## 2025-05-24 RX ADMIN — KETOROLAC TROMETHAMINE 15 MG: 30 INJECTION, SOLUTION INTRAMUSCULAR at 22:40

## 2025-05-24 RX ADMIN — DIAZEPAM 5 MG: 5 INJECTION, SOLUTION INTRAMUSCULAR; INTRAVENOUS at 00:30

## 2025-05-24 RX ADMIN — Medication 1 AMPULE: at 20:49

## 2025-05-24 RX ADMIN — POLYETHYLENE GLYCOL 3350 17 G: 17 POWDER, FOR SOLUTION ORAL at 09:33

## 2025-05-24 RX ADMIN — SODIUM CHLORIDE, PRESERVATIVE FREE 10 ML: 5 INJECTION INTRAVENOUS at 09:34

## 2025-05-24 RX ADMIN — PANTOPRAZOLE SODIUM 40 MG: 40 TABLET, DELAYED RELEASE ORAL at 05:48

## 2025-05-24 RX ADMIN — AMLODIPINE BESYLATE 5 MG: 5 TABLET ORAL at 09:33

## 2025-05-24 ASSESSMENT — PAIN SCALES - GENERAL
PAINLEVEL_OUTOF10: 3
PAINLEVEL_OUTOF10: 2
PAINLEVEL_OUTOF10: 8
PAINLEVEL_OUTOF10: 3
PAINLEVEL_OUTOF10: 0
PAINLEVEL_OUTOF10: 10
PAINLEVEL_OUTOF10: 2
PAINLEVEL_OUTOF10: 10
PAINLEVEL_OUTOF10: 7
PAINLEVEL_OUTOF10: 10
PAINLEVEL_OUTOF10: 6
PAINLEVEL_OUTOF10: 7

## 2025-05-24 ASSESSMENT — PAIN DESCRIPTION - ORIENTATION
ORIENTATION: RIGHT;UPPER
ORIENTATION: RIGHT;LOWER
ORIENTATION: RIGHT
ORIENTATION: RIGHT

## 2025-05-24 ASSESSMENT — PAIN DESCRIPTION - LOCATION
LOCATION: ABDOMEN
LOCATION: ABDOMEN
LOCATION: FLANK
LOCATION: ABDOMEN;FLANK
LOCATION: ABDOMEN

## 2025-05-24 ASSESSMENT — PAIN DESCRIPTION - DESCRIPTORS
DESCRIPTORS: TENDER;SHARP;DISCOMFORT
DESCRIPTORS: ACHING

## 2025-05-24 ASSESSMENT — PAIN DESCRIPTION - ONSET: ONSET: ON-GOING

## 2025-05-24 ASSESSMENT — PAIN DESCRIPTION - FREQUENCY: FREQUENCY: CONTINUOUS

## 2025-05-24 ASSESSMENT — PAIN DESCRIPTION - DIRECTION: RADIATING_TOWARDS: NO

## 2025-05-24 ASSESSMENT — PAIN DESCRIPTION - PAIN TYPE: TYPE: ACUTE PAIN

## 2025-05-24 NOTE — PLAN OF CARE
End of Shift Note    Bedside shift change report given to RN (oncoming nurse) by Aretha Perales RN (offgoing nurse).  Report included the following information SBAR and Procedure Summary    Shift worked:  3040-4414     Shift summary and any significant changes:    1930 Drain flushed with 10 cc saline, total output 20 ml  2300 Pt alerted RN regarding worsening RLQ pain, pt stated 10/10 pain, upon palpation of RUQ pt snacked RN's hand away from RUQ and screamed out in pain. Pt stated \" I feel like something is wrong\".  RN stated to pt she would reach out to the on call MD and return.    2305 RN perfect served MD Jauregui regarding pt's 10/10 RUQ pain.   2334 MD Jauregui followed up with additional questions, no new orders.   2345 RN returned to pt's bedside, RN informed pt next dose of oxycodone is 0100, pt stated \" the earlier dose did not work, I need to go to [facility] where my doctors are\".   2350 Nursing supervisor at bedside discussing pt's request to go to [facility], pt was advised to let day shift provider know of desire to transfer.   0000 RN called a rapid response for assistance and a provider to come to bedside to evaluate pt's RUQ pain.   0005 RRT, nursing supervisor, charge RN, and  at bedside.   0020 New orders given   0030 One time dose of 5mg IV valium given.   0600 Pt transferred to 2311 ( PCU)      Concerns for physician to address:  RUQ pain     Zone phone for oncoming shift:   none       Activity:  Level of Assistance: Minimal assist, patient does 75% or more  Number times ambulated in hallways past shift: 1  Number of times OOB to chair past shift: 3    Cardiac:   Cardiac Monitoring: Yes      Cardiac Rhythm: Ventricular paced    Access:  Current line(s): midline    Genitourinary:   Urinary Status: Voiding, Bathroom privileges    Respiratory:   O2 Device: None (Room air)  Chronic home O2 use?: NO  Incentive spirometer at bedside: NO    GI:  Last BM (including prior to admit): 05/22/25  Current

## 2025-05-24 NOTE — SIGNIFICANT EVENT
RAPID RESPONSE TEAM NOTE:    5/24/25 0000 - ([facility]) Abdominal pain    Assessment:    Pt admitted 5/7/25 with intractable N/V and was a cardiac arrest on 5/10.  Code status - Full    RRT called for urgent provider evaluation. Pt c/o worsening abdominal and head pain.     Neuro - A/O 4, anxious, headache  Cardiac - v-paced  Respiratory - no acute distress, RA  GI/ - 10/10 RUQ pain  Lines/Drains - Midline x2, RLQ accordion drain  Gtts - N/A    Vital signs as follows: /88 amp 107, HR 83, RR 22, Sat 97%, Temp 97.6    Sepsis Screen - negative    Interventions:     at bedside, received orders for:    - Valium 5mg x1  - Toradol prn  - NS 1L bolus  - Repeat UA reflex    Outcome:    Stabilized, no transfer. Patient to remain in room at this time.    RRT end - 0010    Please call with any questions or concerns.  Lara Tompkins RN  RRT [phone]      03-Aug-2020

## 2025-05-25 VITALS
DIASTOLIC BLOOD PRESSURE: 76 MMHG | SYSTOLIC BLOOD PRESSURE: 112 MMHG | HEIGHT: 67 IN | TEMPERATURE: 98 F | BODY MASS INDEX: 32.91 KG/M2 | OXYGEN SATURATION: 94 % | RESPIRATION RATE: 19 BRPM | HEART RATE: 82 BPM | WEIGHT: 209.66 LBS

## 2025-05-25 LAB
ANION GAP SERPL CALC-SCNC: 3 MMOL/L (ref 2–12)
BACTERIA SPEC CULT: NORMAL
BUN SERPL-MCNC: 17 MG/DL (ref 6–20)
BUN/CREAT SERPL: 20 (ref 12–20)
CALCIUM SERPL-MCNC: 8.6 MG/DL (ref 8.5–10.1)
CHLORIDE SERPL-SCNC: 112 MMOL/L (ref 97–108)
CO2 SERPL-SCNC: 24 MMOL/L (ref 21–32)
CREAT SERPL-MCNC: 0.86 MG/DL (ref 0.55–1.02)
EKG ATRIAL RATE: 66 BPM
EKG DIAGNOSIS: NORMAL
EKG P AXIS: 40 DEGREES
EKG P-R INTERVAL: 180 MS
EKG Q-T INTERVAL: 442 MS
EKG QRS DURATION: 88 MS
EKG QTC CALCULATION (BAZETT): 463 MS
EKG R AXIS: -11 DEGREES
EKG T AXIS: 11 DEGREES
EKG VENTRICULAR RATE: 66 BPM
GLUCOSE SERPL-MCNC: 74 MG/DL (ref 65–100)
POTASSIUM SERPL-SCNC: 4.1 MMOL/L (ref 3.5–5.1)
SERVICE CMNT-IMP: NORMAL
SODIUM SERPL-SCNC: 139 MMOL/L (ref 136–145)

## 2025-05-25 PROCEDURE — 6370000000 HC RX 637 (ALT 250 FOR IP): Performed by: INTERNAL MEDICINE

## 2025-05-25 PROCEDURE — 36415 COLL VENOUS BLD VENIPUNCTURE: CPT

## 2025-05-25 PROCEDURE — 6370000000 HC RX 637 (ALT 250 FOR IP): Performed by: STUDENT IN AN ORGANIZED HEALTH CARE EDUCATION/TRAINING PROGRAM

## 2025-05-25 PROCEDURE — 80048 BASIC METABOLIC PNL TOTAL CA: CPT

## 2025-05-25 PROCEDURE — 6360000002 HC RX W HCPCS: Performed by: STUDENT IN AN ORGANIZED HEALTH CARE EDUCATION/TRAINING PROGRAM

## 2025-05-25 PROCEDURE — 2500000003 HC RX 250 WO HCPCS: Performed by: INTERNAL MEDICINE

## 2025-05-25 PROCEDURE — 2500000003 HC RX 250 WO HCPCS: Performed by: NURSE PRACTITIONER

## 2025-05-25 RX ORDER — ALPRAZOLAM 0.5 MG
0.5 TABLET ORAL DAILY PRN
Qty: 5 TABLET | Refills: 0 | Status: SHIPPED | OUTPATIENT
Start: 2025-05-25 | End: 2025-05-30

## 2025-05-25 RX ORDER — AMLODIPINE BESYLATE 5 MG/1
2.5 TABLET ORAL DAILY
Qty: 15 TABLET | Refills: 1 | Status: SHIPPED | OUTPATIENT
Start: 2025-05-26

## 2025-05-25 RX ORDER — KETOROLAC TROMETHAMINE 10 MG/1
10 TABLET, FILM COATED ORAL 2 TIMES DAILY PRN
Qty: 10 TABLET | Refills: 0 | Status: SHIPPED | OUTPATIENT
Start: 2025-05-25 | End: 2026-05-25

## 2025-05-25 RX ORDER — BUTALBITAL, ACETAMINOPHEN AND CAFFEINE 50; 325; 40 MG/1; MG/1; MG/1
1 TABLET ORAL EVERY 4 HOURS PRN
Qty: 15 TABLET | Refills: 0 | Status: SHIPPED | OUTPATIENT
Start: 2025-05-25

## 2025-05-25 RX ORDER — OXYCODONE HYDROCHLORIDE 5 MG/1
7.5 TABLET ORAL EVERY 6 HOURS PRN
Qty: 20 TABLET | Refills: 0 | Status: SHIPPED | OUTPATIENT
Start: 2025-05-25 | End: 2025-05-28

## 2025-05-25 RX ORDER — SENNA AND DOCUSATE SODIUM 50; 8.6 MG/1; MG/1
1 TABLET, FILM COATED ORAL 2 TIMES DAILY PRN
Qty: 30 TABLET | Refills: 1 | Status: SHIPPED | OUTPATIENT
Start: 2025-05-25

## 2025-05-25 RX ADMIN — POLYETHYLENE GLYCOL 3350 17 G: 17 POWDER, FOR SOLUTION ORAL at 09:12

## 2025-05-25 RX ADMIN — Medication 1 AMPULE: at 09:11

## 2025-05-25 RX ADMIN — KETOROLAC TROMETHAMINE 15 MG: 30 INJECTION, SOLUTION INTRAMUSCULAR at 07:06

## 2025-05-25 RX ADMIN — ALPRAZOLAM 0.5 MG: 0.5 TABLET ORAL at 04:33

## 2025-05-25 RX ADMIN — Medication 3000 UNITS: at 09:11

## 2025-05-25 RX ADMIN — SODIUM CHLORIDE, PRESERVATIVE FREE 10 ML: 5 INJECTION INTRAVENOUS at 09:12

## 2025-05-25 RX ADMIN — AMLODIPINE BESYLATE 5 MG: 5 TABLET ORAL at 09:11

## 2025-05-25 RX ADMIN — PANTOPRAZOLE SODIUM 40 MG: 40 TABLET, DELAYED RELEASE ORAL at 06:17

## 2025-05-25 RX ADMIN — KETOROLAC TROMETHAMINE 15 MG: 30 INJECTION, SOLUTION INTRAMUSCULAR at 14:00

## 2025-05-25 RX ADMIN — URSODIOL 300 MG: 300 CAPSULE ORAL at 09:11

## 2025-05-25 ASSESSMENT — PAIN DESCRIPTION - DESCRIPTORS
DESCRIPTORS: ACHING

## 2025-05-25 ASSESSMENT — PAIN DESCRIPTION - ORIENTATION
ORIENTATION: RIGHT

## 2025-05-25 ASSESSMENT — PAIN SCALES - GENERAL
PAINLEVEL_OUTOF10: 7
PAINLEVEL_OUTOF10: 4
PAINLEVEL_OUTOF10: 8
PAINLEVEL_OUTOF10: 0
PAINLEVEL_OUTOF10: 8

## 2025-05-25 ASSESSMENT — PAIN - FUNCTIONAL ASSESSMENT: PAIN_FUNCTIONAL_ASSESSMENT: ACTIVITIES ARE NOT PREVENTED

## 2025-05-25 ASSESSMENT — PAIN DESCRIPTION - LOCATION
LOCATION: ABDOMEN

## 2025-05-25 NOTE — PLAN OF CARE
Problem: Pain  Goal: Verbalizes/displays adequate comfort level or baseline comfort level  Outcome: Progressing     Problem: Cardiovascular - Adult  Goal: Maintains optimal cardiac output and hemodynamic stability  Outcome: Progressing  Goal: Absence of cardiac dysrhythmias or at baseline  Outcome: Progressing     Problem: Skin/Tissue Integrity  Goal: Skin integrity remains intact  Description: 1.  Monitor for areas of redness and/or skin breakdown2.  Assess vascular access sites hourly3.  Every 4-6 hours minimum:  Change oxygen saturation probe site4.  Every 4-6 hours:  If on nasal continuous positive airway pressure, respiratory therapy assess nares and determine need for appliance change or resting period  Outcome: Progressing     Problem: Skin/Tissue Integrity - Adult  Goal: Skin integrity remains intact  Description: 1.  Monitor for areas of redness and/or skin breakdown2.  Assess vascular access sites hourly3.  Every 4-6 hours minimum:  Change oxygen saturation probe site4.  Every 4-6 hours:  If on nasal continuous positive airway pressure, respiratory therapy assess nares and determine need for appliance change or resting period  Outcome: Progressing

## 2025-05-25 NOTE — PLAN OF CARE
Problem: Pain  Goal: Verbalizes/displays adequate comfort level or baseline comfort level  5/24/2025 2013 by Leonarda Preciado RN  Outcome: Progressing  5/24/2025 2000 by Katheryn Alvarez RN  Outcome: Progressing     Problem: Cardiovascular - Adult  Goal: Maintains optimal cardiac output and hemodynamic stability  5/24/2025 2013 by Leonarda Preciado RN  Outcome: Progressing  5/24/2025 2000 by Katheryn Alvarez RN  Outcome: Progressing  Goal: Absence of cardiac dysrhythmias or at baseline  5/24/2025 2000 by Katheryn Alvarez RN  Outcome: Progressing     Problem: Safety - Adult  Goal: Free from fall injury  Outcome: Progressing     Problem: Anxiety  Goal: Will report anxiety at manageable levels  Description: INTERVENTIONS:1. Administer medication as ordered2. Teach and rehearse alternative coping skills3. Provide emotional support with 1:1 interaction with staff  Outcome: Progressing     Problem: Coping  Goal: Pt/Family able to verbalize concerns and demonstrate effective coping strategies  Description: INTERVENTIONS:1. Assist patient/family to identify coping skills, available support systems and cultural and spiritual values2. Provide emotional support, including active listening and acknowledgement of concerns of patient and caregivers3. Reduce environmental stimuli, as able4. Instruct patient/family in relaxation techniques, as appropriate5. Assess for spiritual pain/suffering and initiate Spiritual Care, Psychosocial Clinical Specialist consults as needed  Outcome: Progressing     Problem: Nutrition Deficit:  Goal: Optimize nutritional status  Outcome: Progressing     Problem: Skin/Tissue Integrity  Goal: Skin integrity remains intact  Description: 1.  Monitor for areas of redness and/or skin breakdown2.  Assess vascular access sites hourly3.  Every 4-6 hours minimum:  Change oxygen saturation probe site4.  Every 4-6 hours:  If on nasal continuous positive airway pressure, respiratory therapy assess nares and

## 2025-05-25 NOTE — CARE COORDINATION
Pt clear from CM standpoint.    Transition of Care Plan:    RUR: 7% \"low risk\"  Prior Level of Functioning: Independent with ADL's  Disposition: Home with family support   JUSTINA: 5/25  If SNF or IPR: Date FOC offered: N/A  Follow up appointments: PCP/Specialists as indicated  DME needed: None  Transportation at discharge: Lyft to transport  IM/IMM Medicare/ letter given: N/A  Is patient a  and connected with VA? No  Caregiver Contact: Roly Boyd (parent), [phone]  Discharge Caregiver contacted prior to discharge? To be contacted by pt   Care Conference needed? Not at this time   Barriers to discharge: None     1553 PM: Round trip ride to arrive at 1615 PM. Pt going to [address] [place] [address].     1322 PM: Chart reviewed. CM following for d/c planning. Pt to return home with family support. Pt family to transport pt home at 1500PM. No CM needs identified at this time.     05/25/25 1323   Services At/After Discharge   Transition of Care Consult (CM Consult) Discharge Planning   Services At/After Discharge None   [place] Resource Information Provided? No   Mode of Transport at Discharge Other (see comment)  (Family)   Hospital Transport Time of Discharge 1500   Confirm Follow Up Transport Family   Condition of Participation: Discharge Planning   The Plan for Transition of Care is related to the following treatment goals: return home independently   The Patient and/or Patient Representative was provided with a Choice of Provider? Patient   The Patient and/Or Patient Representative agree with the Discharge Plan? Yes     JEIMY Weller  Care Management  [facility]

## 2025-05-26 NOTE — DISCHARGE SUMMARY
adjustment disorder  Holding PTA as needed hydroxyzine in setting of bradycardia. Xanax as needed.      KAYLI on CPAP  Nocturnal CPAP    She was seen and examined today, vital signs are stable, lab works at baseline and is being released in much improved condition for outpatient follow-up.         Discharge Exam:  Patient seen and examined by me on discharge day.  Pertinent Findings:  No data found.    Gen:    Not in distress  Chest: Clear lungs  CVS:   Regular rhythm.  No edema  Abd:  Soft, not distended, not tender  Neuro: awake, moving all exts    Discharge/Recent Laboratory Results:  Recent Labs     05/24/25  0532 05/25/25  0342    139   K 4.5 4.1   * 112*   CO2 22 24   BUN 11 17   CREATININE 0.87 0.86   GLUCOSE 73 74   CALCIUM 9.2 8.6   MG 2.3  --      Recent Labs     05/24/25  0532   HGB 11.4*   HCT 37.2   WBC 12.4*   *       Discharge Medications:     Medication List        START taking these medications      ALPRAZolam 0.5 MG tablet  Commonly known as: XANAX  Take 1 tablet by mouth daily as needed for Anxiety for up to 5 days. Max Daily Amount: 0.5 mg     amLODIPine 5 MG tablet  Commonly known as: NORVASC  Take 0.5 tablets by mouth daily     Bariatric Fusion Chew  Take 1 tablet by mouth daily (with breakfast)     butalbital-acetaminophen-caffeine -40 MG per tablet  Commonly known as: FIORICET, ESGIC  Take 1 tablet by mouth every 4 hours as needed for Headaches     ketorolac 10 MG tablet  Commonly known as: TORADOL  Take 1 tablet by mouth 2 times daily as needed for Pain     naloxone 4 MG/0.1ML Liqd nasal spray  Commonly known as: Narcan  1 spray by Nasal route as needed for Opioid Reversal     oxyCODONE 5 MG immediate release tablet  Commonly known as: ROXICODONE  Take 1.5 tablets by mouth every 6 hours as needed for Pain for up to 3 days. Max Daily Amount: 30 mg     sennosides-docusate sodium 8.6-50 MG tablet  Commonly known as: SENOKOT-S  Take 1 tablet by mouth 2 times daily as

## 2025-05-27 LAB
EKG ATRIAL RATE: 71 BPM
EKG DIAGNOSIS: NORMAL
EKG P AXIS: 45 DEGREES
EKG P-R INTERVAL: 180 MS
EKG Q-T INTERVAL: 436 MS
EKG QRS DURATION: 86 MS
EKG QTC CALCULATION (BAZETT): 473 MS
EKG R AXIS: -14 DEGREES
EKG T AXIS: 15 DEGREES
EKG VENTRICULAR RATE: 71 BPM

## 2025-05-28 LAB
BACTERIA SPEC CULT: NORMAL
BACTERIA SPEC CULT: NORMAL
SERVICE CMNT-IMP: NORMAL
SERVICE CMNT-IMP: NORMAL

## 2025-05-30 NOTE — PROGRESS NOTES
EP/ Arrhythmia Progress Note    Patient ID:  Patient: Ruma Boyd  MRN: [id number]  Age: 45 y.o.  : 1980 6:52 PM  Admit Date: 2025    Assessment:   Torsades documented in the setting of QT prolongation, bradycardia, hypokalemia, hypomagnesemia.  Continues to have nonsustained VT with R-on-T initiation.  Nausea and vomiting.  Status post lap gastric sleeve and hiatal hernia repair in 2024.  Status post  shunt.  Full code.    Plan:     Avoid QT-prolonging drugs.  If K and Mag are OK and Qtc is less than or equal to 460 msec, I'd be OK initiating antiemetics that could prolong QT.  Agree with repletion of K >4-4.5 and Mag >2.  No overdrive pacing needed.  No chronotrope needed.  HR's above 90 bpm would be the goal.  ECHO pending.    If she has longer or more frequent episodes of VT, even if nonsustained, then will have to reconsider the cardiac plan.  IF she has recurrent Torsades, then the first option would be to give more Mag.  If this doesn't work, then would work on increasing the HR.  Obviously, for sustained pulseless VT or Torsades, would have to initiate ACLS and shock.        update:  This weekend, she did have sustained Torsades that required a shock.  Transferred to the ICU.  Placed on isuprel infusion.  Lytes repleted.    Goal to keep isuprel infusion off if no VT, regardless of HR.     I want to get a cardiac MRI to see if there is evidence of old scar or infiltrative process.  The low-normal HS troponin argues against ACS/myocardial infarction or active myocarditis.    I answered her questions.  She reports no family history of seizure disorder, fainting, sudden cardiac or unexpectedly early death.     update:  Found telemetry tracing from 7/15/2022 in our media section.  The QT corrected is well over 500 msec.  This was taken during an admission for nausea, vomiting, and abdominal 
                                                                                     EP/ Arrhythmia Progress Note    Patient ID:  Patient: Ruma Boyd  MRN: [id number]  Age: 45 y.o.  : 1980 6:22 PM  Admit Date: 2025    Assessment:   Torsades documented in the setting of QT prolongation, bradycardia, hypokalemia, hypomagnesemia.  Continues to have nonsustained VT with R-on-T initiation.  Nausea and vomiting.  Status post lap gastric sleeve and hiatal hernia repair in 2024.  Status post  shunt.  Full code.    Plan:     Avoid QT-prolonging drugs.  If K and Mag are OK and Qtc is less than or equal to 460 msec, I'd be OK initiating antiemetics that could prolong QT.  Agree with repletion of K >4-4.5 and Mag >2.  No overdrive pacing needed.  No chronotrope needed.  HR's above 90 bpm would be the goal.  ECHO pending.    If she has longer or more frequent episodes of VT, even if nonsustained, then will have to reconsider the cardiac plan.  IF she has recurrent Torsades, then the first option would be to give more Mag.  If this doesn't work, then would work on increasing the HR.  Obviously, for sustained pulseless VT or Torsades, would have to initiate ACLS and shock.        update:  This weekend, she did have sustained Torsades that required a shock.  Transferred to the ICU.  Placed on isuprel infusion.  Lytes repleted.    Goal to keep isuprel infusion off if no VT, regardless of HR.     I want to get a cardiac MRI to see if there is evidence of old scar or infiltrative process.  The low-normal HS troponin argues against ACS/myocardial infarction or active myocarditis.    I answered her questions.  She reports no family history of seizure disorder, fainting, sudden cardiac or unexpectedly early death.     update:  Found telemetry tracing from 7/15/2022 in our media section.  The QT corrected is well over 500 msec.  This was taken during an admission for nausea, vomiting, and abdominal 
                                                                                     EP/ Arrhythmia Progress Note    Patient ID:  Patient: Ruma Boyd  MRN: [id number]  Age: 45 y.o.  : 1980 5:44 PM  Admit Date: 2025    Assessment:   Torsades documented in the setting of QT prolongation, bradycardia, hypokalemia, hypomagnesemia.  Continues to have nonsustained VT with R-on-T initiation.  Nausea and vomiting.  Status post lap gastric sleeve and hiatal hernia repair in 2024.  Status post  shunt.  Full code.    Plan:     Avoid QT-prolonging drugs.  If K and Mag are OK and Qtc is less than or equal to 460 msec, I'd be OK initiating antiemetics that could prolong QT.  Agree with repletion of K >4-4.5 and Mag >2.  No overdrive pacing needed.  No chronotrope needed.  HR's above 90 bpm would be the goal.  ECHO pending.    If she has longer or more frequent episodes of VT, even if nonsustained, then will have to reconsider the cardiac plan.  IF she has recurrent Torsades, then the first option would be to give more Mag.  If this doesn't work, then would work on increasing the HR.  Obviously, for sustained pulseless VT or Torsades, would have to initiate ACLS and shock.        update:  This weekend, she did have sustained Torsades that required a shock.  Transferred to the ICU.  Placed on isuprel infusion.  Lytes repleted.    Goal to keep isuprel infusion off if no VT, regardless of HR.     I want to get a cardiac MRI to see if there is evidence of old scar or infiltrative process.  The low-normal HS troponin argues against ACS/myocardial infarction or active myocarditis.    I answered her questions.  She reports no family history of seizure disorder, fainting, sudden cardiac or unexpectedly early death.      [x]       High complexity decision making was performed in this patient    Subjective:     Ruma Boyd denies chest pain.  She still is nauseated.    Review of Systems - No melena, 
                                                                                     EP/ Arrhythmia Progress Note    Patient ID:  Patient: Ruma Boyd  MRN: [id number]  Age: 45 y.o.  : 1980 9:00 AM  Admit Date: 2025    Assessment:   Torsades documented in the setting of QT prolongation, bradycardia, hypokalemia, hypomagnesemia.  Continues to have nonsustained VT with R-on-T initiation.  Nausea and vomiting.  Status post lap gastric sleeve and hiatal hernia repair in 2024.  Status post  shunt.  Full code.    Plan:     Avoid QT-prolonging drugs.  If K and Mag are OK and Qtc is less than or equal to 460 msec, I'd be OK initiating antiemetics that could prolong QT.  Agree with repletion of K >4-4.5 and Mag >2.  No overdrive pacing needed.  No chronotrope needed.  HR's above 90 bpm would be the goal.  ECHO pending.    If she has longer or more frequent episodes of VT, even if nonsustained, then will have to reconsider the cardiac plan.  IF she has recurrent Torsades, then the first option would be to give more Mag.  If this doesn't work, then would work on increasing the HR.  Obviously, for sustained pulseless VT or Torsades, would have to initiate ACLS and shock.        update:  This weekend, she did have sustained Torsades that required a shock.  Transferred to the ICU.  Placed on isuprel infusion.  Lytes repleted.    Goal to keep isuprel infusion off if no VT, regardless of HR.     I want to get a cardiac MRI to see if there is evidence of old scar or infiltrative process.  The low-normal HS troponin argues against ACS/myocardial infarction or active myocarditis.    I answered her questions.  She reports no family history of seizure disorder, fainting, sudden cardiac or unexpectedly early death.     update:  Found telemetry tracing from 7/15/2022 in our media section.  The QT corrected is well over 500 msec.  This was taken during an admission for nausea, vomiting, and abdominal 
                                                                                     EP/ Arrhythmia Progress Note    Patient ID:  Patient: Ruma Boyd  MRN: [id number]  Age: 45 y.o.  : 1980 5:21 PM  Admit Date: 2025    Assessment:   Torsades documented in the setting of QT prolongation, bradycardia, hypokalemia, hypomagnesemia.  Continues to have nonsustained VT with R-on-T initiation.  Nausea and vomiting.  Status post lap gastric sleeve and hiatal hernia repair in 2024.  Status post  shunt.  Full code.    Plan:     Avoid QT-prolonging drugs.  If K and Mag are OK and Qtc is less than or equal to 460 msec, I'd be OK initiating antiemetics that could prolong QT.  Agree with repletion of K >4-4.5 and Mag >2.  No overdrive pacing needed.  No chronotrope needed.  HR's above 90 bpm would be the goal.  ECHO pending.    If she has longer or more frequent episodes of VT, even if nonsustained, then will have to reconsider the cardiac plan.  IF she has recurrent Torsades, then the first option would be to give more Mag.  If this doesn't work, then would work on increasing the HR.  Obviously, for sustained pulseless VT or Torsades, would have to initiate ACLS and shock.        update:  This weekend, she did have sustained Torsades that required a shock.  Transferred to the ICU.  Placed on isuprel infusion.  Lytes repleted.    Goal to keep isuprel infusion off if no VT, regardless of HR.     I want to get a cardiac MRI to see if there is evidence of old scar or infiltrative process.  The low-normal HS troponin argues against ACS/myocardial infarction or active myocarditis.    I answered her questions.  She reports no family history of seizure disorder, fainting, sudden cardiac or unexpectedly early death.     update:  Found telemetry tracing from 7/15/2022 in our media section.  The QT corrected is well over 500 msec.  This was taken during an admission for nausea, vomiting, and abdominal 
                                                                                     EP/ Arrhythmia Progress Note    Patient ID:  Patient: Ruma Boyd  MRN: [id number]  Age: 45 y.o.  : 1980 7:28 PM  Admit Date: 2025    Assessment:   Torsades documented in the setting of QT prolongation, bradycardia, hypokalemia, hypomagnesemia.  Continues to have nonsustained VT with R-on-T initiation.  Nausea and vomiting.  Status post gastric sleeve.  Status post  shunt.  Full code.    Plan:     Avoid QT-prolonging drugs.  If K and Mag are OK and Qtc is less than or equal to 460 msec, I'd be OK initiating antiemetics that could prolong QT.  Agree with repletion of K >4-4.5 and Mag >2.  No overdrive pacing needed.  No chronotrope needed.  HR's above 90 bpm would be the goal.  ECHO pending.    If she has longer or more frequent episodes of VT, even if nonsustained, then will have to reconsider the cardiac plan.  IF she has recurrent Torsades, then the first option would be to give more Mag.  If this doesn't work, then would work on increasing the HR.  Obviously, for sustained pulseless VT or Torsades, would have to initiate ACLS and shock.          [x]       High complexity decision making was performed in this patient    Subjective:     Ruma Boyd denies chest pain.  She still is nauseated.    Review of Systems - No melena, hematemesis, diarrhea, dysuria, gross hematuria, suprapubic or abdominal pain, rash, headache, subjective fever, chills, wheezing, pleurisy, hemoptysis, or epistaxis.    Objective:     Physical Exam:  Temp (24hrs), Av.2 °F (36.8 °C), Min:97.4 °F (36.3 °C), Max:99.4 °F (37.4 °C)    Patient Vitals for the past 8 hrs:   Pulse   25 1309 (!) 45   25 1145 (!) 44    Patient Vitals for the past 8 hrs:   Resp   25 1309 23   25 1145 17    Patient Vitals for the past 8 hrs:   BP   25 1309 (!) 152/71   25 1145 132/68        Intake/Output Summary (Last 
                                                                                     EP/ Arrhythmia Progress Note    Patient ID:  Patient: Ruma Boyd  MRN: [id number]  Age: 45 y.o.  : 1980 8:51 AM  Admit Date: 2025    Assessment:   Torsades documented in the setting of QT prolongation, bradycardia, hypokalemia, hypomagnesemia.  Continues to have nonsustained VT with R-on-T initiation.  Nausea and vomiting.  Status post lap gastric sleeve and hiatal hernia repair in 2024.  Status post  shunt.  Full code.    Plan:     Avoid QT-prolonging drugs.  If K and Mag are OK and Qtc is less than or equal to 460 msec, I'd be OK initiating antiemetics that could prolong QT.  Agree with repletion of K >4-4.5 and Mag >2.  No overdrive pacing needed.  No chronotrope needed.  HR's above 90 bpm would be the goal.  ECHO pending.    If she has longer or more frequent episodes of VT, even if nonsustained, then will have to reconsider the cardiac plan.  IF she has recurrent Torsades, then the first option would be to give more Mag.  If this doesn't work, then would work on increasing the HR.  Obviously, for sustained pulseless VT or Torsades, would have to initiate ACLS and shock.        update:  This weekend, she did have sustained Torsades that required a shock.  Transferred to the ICU.  Placed on isuprel infusion.  Lytes repleted.    Goal to keep isuprel infusion off if no VT, regardless of HR.     I want to get a cardiac MRI to see if there is evidence of old scar or infiltrative process.  The low-normal HS troponin argues against ACS/myocardial infarction or active myocarditis.    I answered her questions.  She reports no family history of seizure disorder, fainting, sudden cardiac or unexpectedly early death.     update:  Found telemetry tracing from 7/15/2022 in our media section.  The QT corrected is well over 500 msec.  This was taken during an admission for nausea, vomiting, and abdominal 
                                                                                     EP/ Arrhythmia Progress Note    Patient ID:  Patient: Ruma Boyd  MRN: [id number]  Age: 45 y.o.  : 1980 1:13 PM  Admit Date: 2025    Assessment:   Torsades documented in the setting of QT prolongation, bradycardia, hypokalemia, hypomagnesemia.  Continues to have nonsustained VT with R-on-T initiation.  Nausea and vomiting.  Status post lap gastric sleeve and hiatal hernia repair in 2024.  Status post  shunt.  Full code.    Plan:     Avoid QT-prolonging drugs.  If K and Mag are OK and Qtc is less than or equal to 460 msec, I'd be OK initiating antiemetics that could prolong QT.  Agree with repletion of K >4-4.5 and Mag >2.  No overdrive pacing needed.  No chronotrope needed.  HR's above 90 bpm would be the goal.  ECHO pending.    If she has longer or more frequent episodes of VT, even if nonsustained, then will have to reconsider the cardiac plan.  IF she has recurrent Torsades, then the first option would be to give more Mag.  If this doesn't work, then would work on increasing the HR.  Obviously, for sustained pulseless VT or Torsades, would have to initiate ACLS and shock.        update:  This weekend, she did have sustained Torsades that required a shock.  Transferred to the ICU.  Placed on isuprel infusion.  Lytes repleted.    Goal to keep isuprel infusion off if no VT, regardless of HR.     I want to get a cardiac MRI to see if there is evidence of old scar or infiltrative process.  The low-normal HS troponin argues against ACS/myocardial infarction or active myocarditis.    I answered her questions.  She reports no family history of seizure disorder, fainting, sudden cardiac or unexpectedly early death.     update:  Found telemetry tracing from 7/15/2022 in our media section.  The QT corrected is well over 500 msec.  This was taken during an admission for nausea, vomiting, and abdominal 
                                                                                     EP/ Arrhythmia Progress Note    Patient ID:  Patient: Ruma Boyd  MRN: [id number]  Age: 45 y.o.  : 1980 9:01 AM  Admit Date: 2025    Assessment:   Torsades documented in the setting of QT prolongation, bradycardia, hypokalemia, hypomagnesemia.  Continues to have nonsustained VT with R-on-T initiation.  Nausea and vomiting.  Status post lap gastric sleeve and hiatal hernia repair in 2024.  Status post  shunt.  Full code.    Plan:     Avoid QT-prolonging drugs.  If K and Mag are OK and Qtc is less than or equal to 460 msec, I'd be OK initiating antiemetics that could prolong QT.  Agree with repletion of K >4-4.5 and Mag >2.  No overdrive pacing needed.  No chronotrope needed.  HR's above 90 bpm would be the goal.  ECHO pending.    If she has longer or more frequent episodes of VT, even if nonsustained, then will have to reconsider the cardiac plan.  IF she has recurrent Torsades, then the first option would be to give more Mag.  If this doesn't work, then would work on increasing the HR.  Obviously, for sustained pulseless VT or Torsades, would have to initiate ACLS and shock.        update:  This weekend, she did have sustained Torsades that required a shock.  Transferred to the ICU.  Placed on isuprel infusion.  Lytes repleted.    Goal to keep isuprel infusion off if no VT, regardless of HR.     I want to get a cardiac MRI to see if there is evidence of old scar or infiltrative process.  The low-normal HS troponin argues against ACS/myocardial infarction or active myocarditis.    I answered her questions.  She reports no family history of seizure disorder, fainting, sudden cardiac or unexpectedly early death.     update:  Found telemetry tracing from 7/15/2022 in our media section.  The QT corrected is well over 500 msec.  This was taken during an admission for nausea, vomiting, and abdominal 
                                                                                     EP/ Arrhythmia Progress Note    Patient ID:  Patient: Ruma Boyd  MRN: [id number]  Age: 45 y.o.  : 1980 5:22 PM  Admit Date: 2025    Assessment:   Torsades documented in the setting of QT prolongation, bradycardia, hypokalemia, hypomagnesemia.  Continues to have nonsustained VT with R-on-T initiation.  Nausea and vomiting.  Status post lap gastric sleeve and hiatal hernia repair in 2024.  Status post  shunt.  Full code.    Plan:     Avoid QT-prolonging drugs.  If K and Mag are OK and Qtc is less than or equal to 460 msec, I'd be OK initiating antiemetics that could prolong QT.  Agree with repletion of K >4-4.5 and Mag >2.  No overdrive pacing needed.  No chronotrope needed.  HR's above 90 bpm would be the goal.  ECHO pending.    If she has longer or more frequent episodes of VT, even if nonsustained, then will have to reconsider the cardiac plan.  IF she has recurrent Torsades, then the first option would be to give more Mag.  If this doesn't work, then would work on increasing the HR.  Obviously, for sustained pulseless VT or Torsades, would have to initiate ACLS and shock.        update:  This weekend, she did have sustained Torsades that required a shock.  Transferred to the ICU.  Placed on isuprel infusion.  Lytes repleted.    Goal to keep isuprel infusion off if no VT, regardless of HR.     I want to get a cardiac MRI to see if there is evidence of old scar or infiltrative process.  The low-normal HS troponin argues against ACS/myocardial infarction or active myocarditis.    I answered her questions.  She reports no family history of seizure disorder, fainting, sudden cardiac or unexpectedly early death.     update:  Found telemetry tracing from 7/15/2022 in our media section.  The QT corrected is well over 500 msec.  This was taken during an admission for nausea, vomiting, and abdominal 
        [facility]                                                                                          Hospitalist Progress Note  Keon Jeffers MD  Office Phone: [phone]        Date of Service:  2025  NAME:  Ruma Boyd  :  1980  MRN:  [id number]       Admission Summary:   Patient 45-year-old female presented to emergency room with intractable nausea vomiting, in the emergency room patient afebrile bradycardic hypertensive, initial EKG in the emergency room showed prolonged QTc, patient had multiple brief runs of V. tach patient received IV magnesium, patient admitted and treated for arrhythmia, prolonged QTc, intractable nausea vomiting, history of gastric sleeve, history of substance use disorder, patient also has history of idiopathic intracranial hypertension status post  shunt, mood disorder generalized anxiety disorder, adjustment disorder, obstructive sleep apnea, gastroenterologist evaluated, advised avoid Zofran due to prolonged QT, no need for endoscopy, patient transferred to ICU after cardiac arrest due to torsades/V. tach, cardiologist evaluated, advised keep potassium above 4 magnesium above 2       Interval history / Subjective:   Patient hemodynamically more stable, off Isuprel drip, hospitalist consulted transfer out of ICU     Assessment & Plan:     1.  Patient admitted to ICU due to cardiac arrest from torsades/V. tach: Received shock, ROSC achieved , patient remained neurologically intact, echo with normal EF, normal valve, MRI PENDING , was placed on Isuprel to maintain heart rate around 90s, now off Isuprel drip will transfer out of ICU, CARDIOLOGY following  Appreciated recommendation MRI pending if QT remains prolong likely need ICD  2.  Persistent nausea vomiting: Improved,   3.  Status post gastric sleeve  4.  Idiopathic intracranial hypertension, status post  shunt  5.  Mood disorder: Resume home medication  6.  History of 
       SUMMARY: 45 F with hx of idiopathic intracranial hypertension with VPS placement in remote past. Also underwent gastric sleeve surgery 12/2024 ([facility]) .  Presented to [facility] ED 05/07 with acute onset of intractable nausea/vomiting.  In the ED, patient was bradycardic with ventricular rate in the 40s and had brief runs of NSVT including polymorphic episode worrisome for torsades.  She was initially admitted to the hospital medicine service and seen in consultation by cardiology.  On the morning of 5/10 she had sustained VT/torsades and lost her pulse.  She underwent brief CPR.  She was not intubated.  She was transferred to the intensive care unit.  Cardiology reevaluated the patient and initiated isoproterenol for persistent bradycardia.    HOSPITAL/ICU COURSE:  5/11 cognition intact.  No new complaints.  No distress.  Frequent PVCs with brief runs of VT. Isoproterenol dose reduced.  Per cardiology recommendations, goal is to maintain HR approximately 90/min  5/12 - no acute events. Remains on isopurel.  HR labile   Addendum- spoke to radiology who states there is a new fluid collection on US that was not previously noted on CT 4 days ago. Concern for abscess vs hematoma.  CT scan ordered.   5/13- off isoprurel.  Mild transaminitis  Continues to have significant right flank pain and shoulder pain    Addendum - Discussed IR drainage of hematoma due to transaminitis-  and persistent pain. IR agrees procedure would likely be beneficial. Asked GI if there was indication for drainage or concern for hepatic compartment syndrome given transaminiits; no recommendations given.  Discussed with patient and father that there is a chance that she could have bleeding again post drain placement but it was less likely given she is not coagulopathic and has normal plt   Discussed with cardiology who also felt the procedure was low risk from a cardiac standpoint.  Will proceed with drainage.   OBJ:  Vitals:    05/13/25 0800 
       SUMMARY: 45 F with hx of idiopathic intracranial hypertension with VPS placement in remote past. Also underwent gastric sleeve surgery 12/2024 ([facility]) .  Presented to [facility] ED 05/07 with acute onset of intractable nausea/vomiting.  In the ED, patient was bradycardic with ventricular rate in the 40s and had brief runs of NSVT including polymorphic episode worrisome for torsades.  She was initially admitted to the hospital medicine service and seen in consultation by cardiology.  On the morning of 5/10 she had sustained VT/torsades and lost her pulse.  She underwent brief CPR.  She was not intubated.  She was transferred to the intensive care unit.  Cardiology reevaluated the patient and initiated isoproterenol for persistent bradycardia.    HOSPITAL/ICU COURSE:  5/11 cognition intact.  No new complaints.  No distress.  Frequent PVCs with brief runs of VT. Isoproterenol dose reduced.  Per cardiology recommendations, goal is to maintain HR approximately 90/min  5/12 - no acute events. Remains on isopurel.  HR labile     Addendum- spoke to radiology who states there is a new fluid collection on US that was not previously noted on CT 4 days ago. Concern for abscess vs hematoma.  CT scan ordered.     OBJ:  Vitals:    05/12/25 0806 05/12/25 0830 05/12/25 0836 05/12/25 0900   BP: 125/84 109/81  102/67   Pulse:  85 85 (!) 109   Resp: 19 18 20 15   Temp:       TempSrc:       SpO2:  96% 95% 94%   Weight:       Height:           Physical Exam:  GEN: NAD  HEENT: NCAT, sclerae white  NECK: No JVD noted  CHEST: Clear to auscultation  CARDIAC: Underlying sinus with extrasystoles.  No M noted  ABD: Soft, NT, NABS  EXT: Warm, no edema  NEURO: Cranial nerves intact, symmetric strength, no focal deficits noted  DERM: No lesions noted        DATA:  I have reviewed the lab results from this day. Relevant abnormalities are discussed in the impression and plan      IMAGING:  CXR: (I have personally reviewed)  5/10 no acute 
       SUMMARY: 45 F with hx of idiopathic intracranial hypertension with VPS placement in remote past. Also underwent gastric sleeve surgery 12/2024 ([facility]) .  Presented to [facility] ED 05/07 with acute onset of intractable nausea/vomiting.  In the ED, patient was bradycardic with ventricular rate in the 40s and had brief runs of NSVT including polymorphic episode worrisome for torsades.  She was initially admitted to the hospital medicine service and seen in consultation by cardiology.  On the morning of 5/10 she had sustained VT/torsades and lost her pulse.  She underwent brief CPR.  She was not intubated.  She was transferred to the intensive care unit.  Cardiology reevaluated the patient and initiated isoproterenol for persistent bradycardia.    HOSPITAL/ICU COURSE:  5/11 cognition intact.  No new complaints.  No distress.  Frequent PVCs with brief runs of VT. Isoproterenol dose reduced.  Per cardiology recommendations, goal is to maintain HR approximately 90/min  5/12 - no acute events. Remains on isopurel.  HR labile   Addendum- spoke to radiology who states there is a new fluid collection on US that was not previously noted on CT 4 days ago. Concern for abscess vs hematoma.  CT scan ordered.   5/13- off isoprurel.  Mild transaminitis  Continues to have significant right flank pain and shoulder pain    Addendum - Discussed IR drainage of hematoma due to transaminitis-  and persistent pain. IR agrees procedure would likely be beneficial. Asked GI if there was indication for drainage or concern for hepatic compartment syndrome given transaminiits; no recommendations given.  Discussed with patient and father that there is a chance that she could have bleeding again post drain placement but it was less likely given she is not coagulopathic and has normal plt   Discussed with cardiology who also felt the procedure was low risk from a cardiac standpoint.  Will proceed with drainage.     5/14: Off isoproterenol gtt since 
       SUMMARY: 45 F with hx of idiopathic intracranial hypertension with VPS placement in remote past. Also underwent gastric sleeve surgery 12/2024 ([facility]) .  Presented to [facility] ED 05/07 with acute onset of intractable nausea/vomiting.  In the ED, patient was bradycardic with ventricular rate in the 40s and had brief runs of NSVT including polymorphic episode worrisome for torsades.  She was initially admitted to the hospital medicine service and seen in consultation by cardiology.  On the morning of 5/10 she had sustained VT/torsades and lost her pulse.  She underwent brief CPR.  She was not intubated.  She was transferred to the intensive care unit.  Cardiology reevaluated the patient and initiated isoproterenol for persistent bradycardia.    HOSPITAL/ICU COURSE:  5/11 cognition intact.  No new complaints.  No distress.  Frequent PVCs with brief runs of VT. Isoproterenol dose reduced.  Per cardiology recommendations, goal is to maintain HR approximately 90/min    SUBJ:  cognition intact.  No distress.  Reports anxiety      OBJ:  Vitals:    05/11/25 1024 05/11/25 1030 05/11/25 1100 05/11/25 1200   BP:  (!) 150/106 (!) 162/103 (!) 173/108   Pulse: 81 88 82 89   Resp: 22 18 19 17   Temp:    98.2 °F (36.8 °C)   TempSrc:    Oral   SpO2:       Weight:       Height:           Physical Exam:  GEN: NAD  HEENT: NCAT, sclerae white  NECK: No JVD noted  CHEST: Clear to auscultation  CARDIAC: Underlying sinus with extrasystoles.  No M noted  ABD: Soft, NT, NABS  EXT: Warm, no edema  NEURO: Cranial nerves intact, symmetric strength, no focal deficits noted  DERM: No lesions noted        DATA:  I have reviewed the lab results from this day. Relevant abnormalities are discussed in the impression and plan      IMAGING:  CXR: (I have personally reviewed)  5/10 no acute findings    IMPRESSION:  S/P brief cardiac arrest  Polymorphic VT/Torsades - thought secondary to electrolyte derangements  Recurrent hypokalemia  Intractable 
      Hospitalist Progress Note    NAME:   Ruma Boyd   : 1980   MRN: [id number]     Date/Time: 2025 2:14 PM  Patient PCP: Heaven Ballard MD    Estimated discharge date:   Barriers: Cardiac MRI- result pending, ICD on , cardio clearance     Patient was admitted on  for intractable vomiting. In the ED, patient was bradycardic with ventricular rate in the 40s and had brief runs of NSVT including polymorphic episode worrisome for torsades.  She was initially admitted to the hospital medicine service and seen in consultation by cardiology.   On 5/10 morning she went into sustained V.tach - Torsades and  lost pulse. CPR was initiated and ROSC obtained after epinephrine x 1 and Shock 200 J x 1. She was not intubated. She was transferred to the intensive care unit. Cardiology reevaluated the patient and initiated isoproterenol for persistent bradycardia.  On : Frequent PVCs with brief runs of VT. Isoproterenol dose reduced.   : off isoprurel.  Mild transaminitis . CT imaging was noted for large subcapsular hepatic hematoma .  S/p liver hematoma drainage catheter placement  by IR . On : Patient was down graded to medical step down unit.     Assessment / Plan:  S/P brief cardiac arrest  Polymorphic VT/Torsades - thought secondary to electrolyte derangements  Recurrent hypokalemia    - On  heart rate went up to 150s while using bathroom to wash up.  Immediately after complained of feeling dizzy and shortness of breath.  Later the heart rate came down to 109.  Oxygen saturation 93%.  Vitals stable.  Telemetry noted for sinus rhythm  -Maintain electrolytes potassium > 4, magnesium> 2.   On   K-3.4 - supplemented potassium    - S/p Cardiac MRI  on : result pending   -As per cardiology - plan for ICD placement on .     : She is going for AICD placement in a.m. tomorrow.  Keep n.p.o. from midnight.  Appreciate cardiology recommendations.  Potassium is normal at 4.5.  
      Hospitalist Progress Note    NAME:   Ruma Boyd   : 1980   MRN: [id number]     Date/Time: 2025 2:17 PM  Patient PCP: Heaven Ballard MD    Estimated discharge date:   Barriers: AICD, cardiology clearance    Patient was admitted on  for intractable vomiting. In the ED, patient was bradycardic with ventricular rate in the 40s and had brief runs of NSVT including polymorphic episode worrisome for torsades.  She was initially admitted to the hospital medicine service and seen in consultation by cardiology.   On 5/10 morning she went into sustained V.tach - Torsades and  lost pulse. CPR was initiated and ROSC obtained after epinephrine x 1 and Shock 200 J x 1. She was not intubated. She was transferred to the intensive care unit. Cardiology reevaluated the patient and initiated isoproterenol for persistent bradycardia.  On : Frequent PVCs with brief runs of VT. Isoproterenol dose reduced.   : off isoprurel.  Mild transaminitis . CT imaging was noted for large subcapsular hepatic hematoma .  S/p liver hematoma drainage catheter placement  by IR . On : Patient was down graded to medical step down unit.     Assessment / Plan:  S/P brief cardiac arrest  Polymorphic VT/Torsades likely due to electrolyte imbalance  Recurrent hypokalemia    - On  heart rate went up to 150s while using bathroom to wash up.  Immediately after complained of feeling dizzy and shortness of breath.  Later the heart rate came down to 109.  Oxygen saturation 93%.  Vitals stable.  Telemetry noted for sinus rhythm  -Maintain electrolytes potassium > 4, magnesium> 2.   On   K-3.4 - supplemented potassium    - S/p Cardiac MRI  on   -As per cardiology - plan for ICD placement on .     : She is going for AICD placement in a.m. tomorrow.  Keep n.p.o. from midnight.  Appreciate cardiology recommendations.  Potassium is normal at 4.5.  Check magnesium level.  MRI of the heart shows mild global 
      Hospitalist Progress Note    NAME:   Ruma Boyd   : 1980   MRN: [id number]     Date/Time: 2025 5:14 PM  Patient PCP: Heaven Ballard MD    Estimated discharge date:   Barriers: Improvement of leukocytosis    Patient was admitted on  for intractable vomiting. In the ED, patient was bradycardic with ventricular rate in the 40s and had brief runs of NSVT including polymorphic episode worrisome for torsades.  She was initially admitted to the hospital medicine service and seen in consultation by cardiology.   On 5/10 morning she went into sustained V.tach - Torsades and  lost pulse. CPR was initiated and ROSC obtained after epinephrine x 1 and Shock 200 J x 1. She was not intubated. She was transferred to the intensive care unit. Cardiology reevaluated the patient and initiated isoproterenol for persistent bradycardia.  On : Frequent PVCs with brief runs of VT. Isoproterenol dose reduced.   : off isoprurel.  Mild transaminitis . CT imaging was noted for large subcapsular hepatic hematoma .  S/p liver hematoma drainage catheter placement  by IR . On : Patient was down graded to medical step down unit.     Assessment / Plan:  S/P brief cardiac arrest  Polymorphic VT/Torsades likely due to electrolyte imbalance  Recurrent hypokalemia    - On  heart rate went up to 150s while using bathroom to wash up.  Immediately after complained of feeling dizzy and shortness of breath.  Later the heart rate came down to 109.  Oxygen saturation 93%.  Vitals stable.  Telemetry noted for sinus rhythm  -Maintain electrolytes potassium > 4, magnesium> 2.   On   K-3.4 - supplemented potassium    - S/p Cardiac MRI  on   -As per cardiology - plan for ICD placement on .     : She is going for AICD placement in a.m. tomorrow.  Keep n.p.o. from midnight.  Appreciate cardiology recommendations.  Potassium is normal at 4.5.  Check magnesium level.  MRI of the heart shows mild global 
      Hospitalist Progress Note    NAME:   Ruma Boyd   : 1980   MRN: [id number]     Date/Time: 2025 3:29 PM  Patient PCP: Heaven Ballard MD    Estimated discharge date:  Barriers: improvement n/v      Assessment / Plan:    Arrhythmia  Prolonged Qtc  Continuous telemetry  Cardiology consulted, greatly appreciate their expertise  2 g IV magnesium given in ED with improvement in QTc from 525 => 488  Qtc continue to improve around 487  Avoid QT prolonging agents   cards input noted , avoid antinausea meds , no indication for pacemaker   : Persistent nausea vomiting, as needed Valium  Has a scopolamine patch.  Repeat QTc is 472  Continue IV fluid  I reach out to GI to reevaluate patient.  GI had signed off and recommended no EGD  HTN   C/w norvasc    Non AG metabolic acidosis   Status post bicarb drip repeat BMP showed anion gap close  Continue IV fluid  Intractable nausea and vomiting  History of gastric sleeve  Recent marijuana use  History of substance abuse  As needed Zofran   - Hold further antiemetics in setting of prolonged QTc, arrhythmia, and oversedation  Increase Protonix to twice daily-IV/oral options available  Gastroenterology consulted, greatly appreciate their expertise  Counseled on abstinence from marijuana out of concern for cannabinoid hyperemesis  UDS positive for cannabinoid  Start maintenance fluids     Leucocytosis   ?UTI  Continue with ceftriaxone   Idiopathic intracranial hypertension post  shunt  Migraine disorder  Status post  shunt-CT head without evidence of complication  If symptoms persist despite supportive therapy consider neurology consultation  .  Patient is not complaining of headaches     Psychiatric disorders: MDD, MAYRA, history intentional overdose, adjustment disorder  Holding PTA as needed hydroxyzine in setting of bradycardia     KAYLI on CPAP  Nocturnal CPAP     Medical Decision Making:   I personally reviewed labs: cbc bmp   I personally 
      Hospitalist Progress Note    NAME:   Ruma Boyd   : 1980   MRN: [id number]     Date/Time: 2025 3:59 PM  Patient PCP: Heaven Ballard MD    Estimated discharge date:  Barriers: Cardiac MRI    Patient was admitted on  for intractable vomiting. In the ED, patient was bradycardic with ventricular rate in the 40s and had brief runs of NSVT including polymorphic episode worrisome for torsades.  She was initially admitted to the hospital medicine service and seen in consultation by cardiology.   On 5/10 morning she went into sustained V.tach - Torsades and  lost pulse. CPR was initiated and ROSC obtained after epinephrine x 1 and Shock 200 J x 1. She was not intubated. She was transferred to the intensive care unit. Cardiology reevaluated the patient and initiated isoproterenol for persistent bradycardia.  On : Frequent PVCs with brief runs of VT. Isoproterenol dose reduced.   : off isoprurel.  Mild transaminitis . CT imaging was noted for large subcapsular hepatic hematoma .  S/p liver hematoma drainage catheter placement  by IR . On : Patient was down graded to medical step down unit.     Assessment / Plan:  S/P brief cardiac arrest  Polymorphic VT/Torsades - thought secondary to electrolyte derangements  Recurrent hypokalemia    - On  heart rate went up to 150s while using bathroom to wash up.  Immediately after complained of feeling dizzy and shortness of breath.  Later the heart rate came down to 109.  Oxygen saturation 93%.  Vitals stable.  Telemetry noted for sinus rhythm  -Maintain electrolytes potassium > 4, magnesium> 2.  QTc at present 461.  -Cardiac MRI still pending.  - Cardiology following        Large subcapsular hepatic hematoma s/p drain placement on    - Continue with drain care . LFT trending down.  Hb has remained stable at 10     Intractable nausea and vomiting  History of gastric sleeve  Recent marijuana use  History of substance abuse  - Evaluated by 
      Hospitalist Progress Note    NAME:   Ruma Boyd   : 1980   MRN: [id number]     Date/Time: 2025 9:32 PM  Patient PCP: Heaven Ballard MD    Estimated discharge date:05/10  Barriers: improvement n/v      Assessment / Plan:    Arrhythmia  Prolonged Qtc  Admit to stepdown unit  Continuous telemetry  Cardiology consulted, greatly appreciate their expertise  2 g IV magnesium given in ED with improvement in QTc from 525 => 488  Qtc continue to improve around 487  Avoid QT prolonging agents   cards input noted , avoid antinausea meds , no indication for pacemaker     HTN   C/w norvasc    Non AG metabolic acidosis   Start bicarb drip repeat BMP      Intractable nausea and vomiting  History of gastric sleeve  Recent marijuana use  History of substance abuse  As needed Zofran   - Hold further antiemetics in setting of prolonged QTc, arrhythmia, and oversedation  Increase Protonix to twice daily-IV/oral options available  Gastroenterology consulted, greatly appreciate their expertise  Counseled on abstinence from marijuana out of concern for cannabinoid hyperemesis  Check UDS and serum ethanol  Start maintenance fluids     Leucocytosis   ?UTI  Start ceftriaxone   Idiopathic intracranial hypertension post  shunt  Migraine disorder  Status post  shunt-CT head without evidence of complication  If symptoms persist despite supportive therapy consider neurology consultation     Psychiatric disorders: MDD, MAYRA, history intentional overdose, adjustment disorder  Holding PTA as needed hydroxyzine in setting of bradycardia     KAYLI on CPAP  Nocturnal CPAP             Medical Decision Making:   I personally reviewed labs: cbc bmp   I personally reviewed imaging:CT head   I personally reviewed EKG:yes sinus bradycardia on monitor , EKG showed qtc 487  Toxic drug monitoring: monitor cbc while on lovenox   Discussed case with: pt , RN ,  , PT/OT during IDR         Code Status: full   DVT Prophylaxis: 
      Hospitalist Progress Note    NAME:   Ruma Boyd   : 1980   MRN: [id number]     Date/Time: 2025 2:23 PM  Patient PCP: Heaven Ballard MD    Estimated discharge date:  Barriers: Cardiac MRI, ICD on Monday     Patient was admitted on  for intractable vomiting. In the ED, patient was bradycardic with ventricular rate in the 40s and had brief runs of NSVT including polymorphic episode worrisome for torsades.  She was initially admitted to the hospital medicine service and seen in consultation by cardiology.   On 5/10 morning she went into sustained V.tach - Torsades and  lost pulse. CPR was initiated and ROSC obtained after epinephrine x 1 and Shock 200 J x 1. She was not intubated. She was transferred to the intensive care unit. Cardiology reevaluated the patient and initiated isoproterenol for persistent bradycardia.  On : Frequent PVCs with brief runs of VT. Isoproterenol dose reduced.   : off isoprurel.  Mild transaminitis . CT imaging was noted for large subcapsular hepatic hematoma .  S/p liver hematoma drainage catheter placement  by IR . On : Patient was down graded to medical step down unit.     Assessment / Plan:  S/P brief cardiac arrest  Polymorphic VT/Torsades - thought secondary to electrolyte derangements  Recurrent hypokalemia    - On  heart rate went up to 150s while using bathroom to wash up.  Immediately after complained of feeling dizzy and shortness of breath.  Later the heart rate came down to 109.  Oxygen saturation 93%.  Vitals stable.  Telemetry noted for sinus rhythm  -Maintain electrolytes potassium > 4, magnesium> 2.  Supplemented potassium on .     -Cardiac MRI scheduled on .  - Cardiology recommendation appreciated . Plan for ICD placement next week         Large subcapsular hepatic hematoma s/p drain placement on    - Continue with drain care . LFT trending down.  Hb has remained stable 9-10. Minimal output. Will consult IR on  - 
      Hospitalist Progress Note    NAME:   Ruma Boyd   : 1980   MRN: [id number]     Date/Time: 2025 3:52 PM  Patient PCP: Heaven Ballard MD    Estimated discharge date:   Barriers: Cardiac MRI- result pending, ICD on , cardio clearance     Patient was admitted on  for intractable vomiting. In the ED, patient was bradycardic with ventricular rate in the 40s and had brief runs of NSVT including polymorphic episode worrisome for torsades.  She was initially admitted to the hospital medicine service and seen in consultation by cardiology.   On 5/10 morning she went into sustained V.tach - Torsades and  lost pulse. CPR was initiated and ROSC obtained after epinephrine x 1 and Shock 200 J x 1. She was not intubated. She was transferred to the intensive care unit. Cardiology reevaluated the patient and initiated isoproterenol for persistent bradycardia.  On : Frequent PVCs with brief runs of VT. Isoproterenol dose reduced.   : off isoprurel.  Mild transaminitis . CT imaging was noted for large subcapsular hepatic hematoma .  S/p liver hematoma drainage catheter placement  by IR . On : Patient was down graded to medical step down unit.     Assessment / Plan:  S/P brief cardiac arrest  Polymorphic VT/Torsades - thought secondary to electrolyte derangements  Recurrent hypokalemia    - On  heart rate went up to 150s while using bathroom to wash up.  Immediately after complained of feeling dizzy and shortness of breath.  Later the heart rate came down to 109.  Oxygen saturation 93%.  Vitals stable.  Telemetry noted for sinus rhythm  -Maintain electrolytes potassium > 4, magnesium> 2.   On   K-3.4 - supplemented potassium    - S/p Cardiac MRI  on : result pending   -As per cardiology - plan for ICD placement on .         Large subcapsular hepatic hematoma s/p drain placement on    - Continue with drain care . LFT trending down.  Hb has remained stable 9.9. -On 
      Hospitalist Progress Note    NAME:   Ruma Boyd   : 1980   MRN: [id number]     Date/Time: 5/10/2025 11:45 AM  Patient PCP: Heaven Ballard MD    Estimated discharge date:  Barriers: Transferred to the ICU      Assessment / Plan:  Pulseless Vtach   Patient went unresponsive after an episode of V. tach with heart rate above 200  Patient was noticed to have nonsustained V. tach this morning which has become more longer  Stat lab ordered BMP mag Phos troponin. IV magnesium 2 g ordered  Patient was seen earlier, was still complaining of nausea vomiting, but has not been receiving Zofran due to persistent QTc prolongation  At the end of midline placement, patient became unresponsive, lost pulse  And CPR was initiated with return of ROSC after 2 minutes  Patient received  one shock during ACLS   Patient awake alert oriented x 4 and was crying when her rhythm  came back to normal after the shock.  She was complaining of pain  Patient was seen by intensivist, cardiologist and transferred to the ICU for further management    Arrhythmia  Prolonged Qtc  Continuous telemetry  Cardiology consulted, greatly appreciate their expertise  2 g IV magnesium given in ED with improvement in QTc from 525 => 488  Qtc continue to improve around 487  Avoid QT prolonging agents   cards input noted , avoid antinausea meds , no indication for pacemaker   : Persistent nausea vomiting, as needed Valium  Has a scopolamine patch.  Repeat QTc is 472  Continue IV fluid  I reach out to GI to reevaluate patient.  GI had signed off and recommended no EGD  HTN   C/w norvasc   05/10: Episode of nonsustained V. tach   Non AG metabolic acidosis   Status post bicarb drip repeat BMP showed anion gap close  Continue IV fluid, potassium added  Intractable nausea and vomiting  History of gastric sleeve  Recent marijuana use  History of substance abuse  As needed Zofran   - Hold further antiemetics in setting of prolonged QTc, 
      Hospitalist Progress Note    NAME:   Ruma Boyd   : 1980   MRN: [id number]     Date/Time: 2025 1:56 PM  Patient PCP: Heaven Ballard MD    Estimated discharge date:  Barriers: Cardiac MRI, ICD on Monday     Patient was admitted on  for intractable vomiting. In the ED, patient was bradycardic with ventricular rate in the 40s and had brief runs of NSVT including polymorphic episode worrisome for torsades.  She was initially admitted to the hospital medicine service and seen in consultation by cardiology.   On 5/10 morning she went into sustained V.tach - Torsades and  lost pulse. CPR was initiated and ROSC obtained after epinephrine x 1 and Shock 200 J x 1. She was not intubated. She was transferred to the intensive care unit. Cardiology reevaluated the patient and initiated isoproterenol for persistent bradycardia.  On : Frequent PVCs with brief runs of VT. Isoproterenol dose reduced.   : off isoprurel.  Mild transaminitis . CT imaging was noted for large subcapsular hepatic hematoma .  S/p liver hematoma drainage catheter placement  by IR . On : Patient was down graded to medical step down unit.     Assessment / Plan:  S/P brief cardiac arrest  Polymorphic VT/Torsades - thought secondary to electrolyte derangements  Recurrent hypokalemia    - On  heart rate went up to 150s while using bathroom to wash up.  Immediately after complained of feeling dizzy and shortness of breath.  Later the heart rate came down to 109.  Oxygen saturation 93%.  Vitals stable.  Telemetry noted for sinus rhythm  -Maintain electrolytes potassium > 4, magnesium> 2.     - S/p Cardiac MRI  on - result pending   - Cardiology recommendation appreciated . Plan for ICD placement next week . Will keep NPO overnight in anticipation of procedure.         Large subcapsular hepatic hematoma s/p drain placement on    - Continue with drain care . LFT trending down.  Hb has remained stable 9.9. Minimal 
      Hospitalist Progress Note    NAME:   Ruma Boyd   : 1980   MRN: [id number]     Date/Time: 2025 5:04 PM  Patient PCP: Heaven Ballard MD    Estimated discharge date:  Barriers: Cardiac MRI, ICD on Monday     Patient was admitted on  for intractable vomiting. In the ED, patient was bradycardic with ventricular rate in the 40s and had brief runs of NSVT including polymorphic episode worrisome for torsades.  She was initially admitted to the hospital medicine service and seen in consultation by cardiology.   On 5/10 morning she went into sustained V.tach - Torsades and  lost pulse. CPR was initiated and ROSC obtained after epinephrine x 1 and Shock 200 J x 1. She was not intubated. She was transferred to the intensive care unit. Cardiology reevaluated the patient and initiated isoproterenol for persistent bradycardia.  On : Frequent PVCs with brief runs of VT. Isoproterenol dose reduced.   : off isoprurel.  Mild transaminitis . CT imaging was noted for large subcapsular hepatic hematoma .  S/p liver hematoma drainage catheter placement  by IR . On : Patient was down graded to medical step down unit.     Assessment / Plan:  S/P brief cardiac arrest  Polymorphic VT/Torsades - thought secondary to electrolyte derangements  Recurrent hypokalemia    - On  heart rate went up to 150s while using bathroom to wash up.  Immediately after complained of feeling dizzy and shortness of breath.  Later the heart rate came down to 109.  Oxygen saturation 93%.  Vitals stable.  Telemetry noted for sinus rhythm  -Maintain electrolytes potassium > 4, magnesium> 2.     - S/p Cardiac MRI  on   - On : Cardiology evaluation pending.  MRI result pending.  Patient was placed n.p.o. overnight in anticipation of procedure-changed to diet.        Large subcapsular hepatic hematoma s/p drain placement on    - Continue with drain care . LFT trending down.  Hb has remained stable 9.9. -On : 
     Nutrition Note    Chart reviewed, case discussed during CCU rounds.  Pt is s/p gastric sleeve Dec 2024.  She is down 7.2% over the last month and 28% over the past 12 months, all medically supervised wt loss.  She does not currently have her postop bariatric vitamins ordered, per discussion with Intensivist okay to start them.  Will also add Ensure Max Protein BID, delivered 4 pack to pts room and encourage pt to consume 1-2 per day.  Discussed with pt consuming her protein first, followed by non-starchy vegetable and can have a bite or 2 of starch/CHO if she is not too full.  She recalls her postop bariatric principles and will follow them.      Start bariatric MVI daily, 1200-1500mg Calcium Citrate (broken up to BID-TID) and timed at least 2h from MVI + Fe, 500 mcg Vitamin B12, and 3000 IU Vitamin D3.      Will provide additional Ensure Max Protein Wednesday if she is still here.  Pt with no questions at this time.     Electronically signed by Vandana Green RD, [facility] on 5/12/25 at 2:20 PM EDT    Contact: ext [phone]    
  1116: Code BLUE called at this time for unresponsiveness during Midline insertion.     1117: Code BLUE team now arrived to the bedside including SHIRA Steel, DOUG nurse Armen, CCU Charge nurse, as well as intensivist Dr. Alcala.     On my arrival, CPR in progress. Back board in place. Bag ventilation in progress. Epinephrine given. Pads already on patient.     1118: Torsades on the monitor. Pt. Cardioverted with 200 joules per Dr. Alcala     1119: Pulse now present. Bag ventilation still in progress. Pt. ST on the monitor.     1121: Pt. Now responsive and following all commands. /141, HR 55, RR 24, Oxygen saturation 95% on 3 liter NC.     1127: Pt. Now tearful due to pain and anxiety. Fentanyl IVP 25 mcgs now ordered and given per Dr. Alcala.     Received the following orders from Dr. Alcala at the bedside: STAT EKG, STAT CXR, STAT lab work (BMP, Phos, Mag, Lactic acid, and Troponin), and Amiodarone infusion.    1130: Amiodarone infusion started at 0.5 mcgs per Dr. Alcala.     1135: Dr. Chavez at the bedside. Per MD, stop Amiodarone infusion now and start Isuprel. Awaiting med from pharmacy.     1138: Magnesium replacement started per Dr. Alcala.     1150: Pt. Now arrived to CCU room 9881.    1152: Isuprel infusion started per MAR to keep a HR greater than 90. See MAR for rates and titrations. Code report given to Shun Rodarte RN.            
.End of Shift Note    Bedside shift change report given to ANIA Matrinez (oncoming nurse) by Jose Juan Brice RN (offgoing nurse).  Report included the following information SBAR    Shift worked:  0700 - 1900     Shift summary and any significant changes:    Pt. Tolerated all medication w/o issue.  No c/o pain or distress.  HR elevated when patient is ambulating.  Cardiology following.  Patient may require pacemaker.     Concerns for physician to address: No     Zone phone for oncoming shift:  No       Activity:  Level of Assistance: Minimal assist, patient does 75% or more  Number times ambulated in hallways past shift: 0  Number of times OOB to chair past shift: 0    Cardiac:   Cardiac Monitoring: Yes      Cardiac Rhythm: Sinus rhythm    Access:  Current line(s): PIV     Genitourinary:   Urinary Status: Voiding    Respiratory:   O2 Device: None (Room air)  Chronic home O2 use?: NO  Incentive spirometer at bedside: NO    GI:  Last BM (including prior to admit): 05/15/25  Current diet:  ADULT DIET; Regular; Low Fat (less than or equal to 50 gm/day); No Concentrated sweets  ADULT ORAL NUTRITION SUPPLEMENT; Breakfast, Dinner; Other Oral Supplement; ensure max protein, RD to drop off  Passing flatus: YES    Pain Management:   Patient states pain is manageable on current regimen: YES    Skin:  Ankit Scale Score: 20  Interventions: Wound Offloading (Prevention Methods): Bed, pressure reduction mattress, Pillows, Repositioning, Turning    Patient Safety:  Fall Risk: Nursing Judgement-Fall Risk High(Add Comments): Yes  Fall Risk Interventions  Nursing Judgement-Fall Risk High(Add Comments): Yes  Toilet Every 2 Hours-In Advance of Need: Yes  Hourly Visual Checks: Awake, In chair  Fall Visual Posted: Armband, Fall sign posted, Socks  Room Door Open: Yes  Alarm On: Bed  Patient Moved Closer to Nursing Station: No    Active Consults:   IP CONSULT TO CARDIOLOGY  IP CONSULT TO GI  IP CONSULT TO VASCULAR ACCESS TEAM  IP CONSULT TO 
0710: received report. Patient alert and oriented X4. Endorses right upper abdominal and right shoulder pain.   0800: Assessment and vitals completed. Up with X1 assist to BSC. Voiding.   1200: Reassessment and vitals completed.  1405: Went to IR for accordion drain placement.   1540: returned to unit from IR w/ RUQ accordion drain. Reassessment and Vitals completed. Patient drowsy during encounter. Denies pain.   1614: Patient alert, endorses RUQ abdominal pain and anxiety, received PRN oxycodone and Xanax.  1700: family visiting at bedside.  1900: report given to Ari.    
0715: Bedside shift report given by Emeli/Melissa RN (offgoing nurse).     0800: Patient assessment completed, see flowsheet. Patient remains on Isuprel at 0.5 mcg/min, see MAR. QTc 0.48 per telemetry measurement.      0806: Pain in right shoulder 8/10 - PRN Oxycodone given per order.     0828: Patient assisted to bedside commode to urinate - see flowsheet. Assisted back to bed, no complaints at this time.     0831: Xanax given per order due to patient request.     1056: EKG completed per verbal order. QTc 477 per EKG.     1200: Reassessment completed. No new changes noted. QTc 0.46 per telemetry measurement.     1230: Transported pt to CT Scan per order with 2 RNs present at bedside. Pt connected to [facility] per unit protocol.     1315: Returned from CT Scan. No new complaints or concerns at this time. MD made aware of CT completion.     1420: Patient had one episode of emesis. Remains nauseous.     1453: Provider notified of CT read. New orders given - see orders.     1558: Xanax given per order due to patient request.  Pain in right lower abdomen 8/10 - PRN Oxycodone given per order.     1600: Per telemetry measurement, QTc 0.47. Reassessment completed, no new changes noted.     1646: VCS paged to inquire about plan of care / Isuprel gtt.    1657: Isuprel stopped - .     1710: Orders received for PICC line placement - [facility] paged. Informed RN they have added patient to their board for a nurse to come.     1735: [facility] RN present at bedside.     1739: Dr. Pritchard (cardiology) present at bedside. New orders received for cardiac MRI.     1900: Bedside shift report given to Arya, RN (oncoming nurse). Patient remains off of Isuprel, see MAR. [facility] RN remains present at bedside.       
0800: Report called to receiving RN, but unable to take report.   0830: Called again to give report, RN unable to take report.   0850: report given to RN.    TRANSFER - OUT REPORT:    Verbal report given to Laurel on Ruma Boyd  being transferred to [facility] room 2155 for routine progression of patient care       Report consisted of patient's Situation, Background, Assessment and   Recommendations(SBAR).     Information from the following report(s) Nurse Handoff Report, Intake/Output, and Neuro Assessment was reviewed with the receiving nurse.           Lines:       Opportunity for questions and clarification was provided.      Patient transported with:  Monitor, Patient's medications from home, and Registered Nurse       
0830 QTC 0.461    1143 Pt went to bathroom to wash up and her heart rate went up to 156 (sinus) with exertion. She c/o feeling dizzy and sob. Assisted pt back to bed. With rest hr came down to 109. O2 sats 93% bp 112/69  
0930 - Pt nauseous after eating and vomiting;./'  
1076-0075 - PT c/o N/V. MRI postponed.      1030 - Pt agreeable to only take certain meds, as she's able to tolerated them. Dr. Steve aware.    1700 - PT able to ambulate in the [place] with nursing with 1 assist with rollator. Pt tachycardic in 120s-130s but asymptomatic. Activity tolerated fairly well.  Message sent to cards per intensivist to see if pt can be downgraded.      1730 - Followed up with MRI regarding scan. Patient cannot be done tonight but should be able to be scanned tomorrow.  
1150: Pt received on unit following Code Blue that occurred on PCU. Isuprel infusing @ 2 mcg/min. Pt receiving magnesium replacement.     1157: Isuprel infusion titrated per MD order, see MAR for amount. Goal HR > 90.     1200: Admission assessment completed. Pt is alert and oriented x 4. Pt is tearful and anxious. Pt follows commands. Pt opens eyes spontaneously and tracks/focuses appropriately. Lung sounds are clear bilaterally. Pt is on 2 L/min O2 via NC. Pt is sinus rhythm on monitor. Bowel sounds are hypoactive throughout. Pt is continent of urine. Pt has left brachial midline in place, dressing changed. Isuprel infusing @ 2.5 mcg/min.     1217: Pt HR > 114, Isuprel infusion weaned per MD order. See MAR for amount.     1308: PRN Fentanyl 25 mcg IVP given for pain 10/10.     1348: Dr. Alcala advised that patient still having 10/10 pain in right shoulder/arm down to thumb. Verbal order received to give Oxycodone 10 mg po x 1 dose.     1353: PRN Oxycodone 10 mg PO given for pain 10/10.     1600: Shift reassessment completed, no changes to previous assessment. Isuprel infusing @ 1.5 mcg/min.     1643: Dr. Alcala advised that patient now c/o right abdominal pain and nausea. Pt also dry heaving and screaming out in pain. Pt states pain is 10/10. Verbal order received to give compazine 10 mg and continue to monitor. Asked if we needed to get any imaging and was told no for now, to see how patient does with compazine.     1649: Compazine 10 mg IVP given for nausea/vomiting.     1900: Bedside shift report given to ANIA Torres (oncoming nurse). Isuprel infusing @ 1.5 mcg/min.             
1258: Dr. Alcala advised that patient having 10/10 right shoulder pain post CPR earlier today. Verbal order received for Fentanyl 25 mcg Q 2 HR PRN pain.   
1440: Patient arrived to [facility] via inpatient bed. Patient is A&Ox4 on RA in NAD at this time. Code status, allergies, and NPO status verified with patient prior to procedure. MD Cleary consenting patient for procedure.     Name of Procedure: Liver Hematoma Drain Placement      Start: 1455  End: 1510     Medications given:     Fentanyl: 25 mcg     Vital Signs:  VSS throughout      Fluids Removed: 50 ml      Samples sent to lab: hold     Any complications related to procedure: none identified at this time     Patient is A&Ox4, on RA, and is in NAD at this time. Drain should be flushed with 10 ml NS Qshift per IR MD Cleary.     TRANSFER - OUT REPORT:    Verbal report given to primary RN on Ruma Boyd  being transferred to [facility] for routine progression of patient care       Report consisted of patient's Situation, Background, Assessment and   Recommendations(SBAR).     Information from the following report(s) Nurse Handoff Report and MAR was reviewed with the receiving nurse.           Lines:       Opportunity for questions and clarification was provided.      Patient transported with:  Registered Nurse        
1900: Bedside shift change report given to Lorena RN (oncoming nurse) by Aster RN (offgoing nurse). Report included the following information Nurse Handoff Report, Adult Overview, MAR, and Recent Results.     2000: Assessment completed, see flowsheets. Patient's heart rate will spike up to 150s when using bedside commode. Qtc 0.49    2145: Patient complained of pain 10/10, oxycodone and acetaminophen given per MAR.    0000: Reassessment completed, see flowsheets. Qtc 0.45    0400: Reassessment completed, see flowsheets. Qtc 0.4    0700: Bedside shift change report given to Surinder RN (oncoming nurse) by Emeli/Melissa RN (offgoing nurse). Report included the following information Nurse Handoff Report, Adult Overview, MAR, and Recent Results.     
1930 Bedside and Verbal shift change report given to ANIA Koenig (oncoming nurse) by KAUSHAL Santana/ANIA Hoover (offgoing nurse). Report included the following information Nurse Handoff Report, Intake/Output, MAR, Recent Results, Cardiac Rhythm Sinus Tachycardia, and Alarm Parameters.      2000 Shift assessment done. Patient alert and oriented x4, follows commands. Patient verbalized right shoulder pain scale 8/10 and anxiety, PRN Roxicodone and Xanax given. Lungs are clear and diminished on room air. Patient voiding via bedside commode to jackie, hazy output. Bilateral midline noted, both of which have no blood return upon flushing. Patient is a difficult stick as per AM shift nurses, ANIA Belle(PICC team) at bedside, failed attempts to insert a PICC line. See flowsheets/MAR/Vascular notes for details.    2105 MRI Screening Form accomplished with patient.    0000 Reassessment done. No acute changes noted.    0400 Reassessment done. No acute changes noted. Morning labs drawn and tubed to lab.    0409 HR sustaining <90s. Isuprel drip restarted at 2mcg/min. See MAR for details.    0556 . Isuprel drip stopped.    0715 Bedside and Verbal shift change report given to ANIA Santana/ANIA Ayala (oncoming nurse) by ANIA Koenig (offgoing nurse). Report included the following information Nurse Handoff Report, Intake/Output, MAR, Recent Results, Cardiac Rhythm NSR/Sinus Tachycardia, and Alarm Parameters.            
1932: Bedside and Verbal shift change report given to RALF Holm, RN (oncoming nurse) by RALF Ruvalcaba and BERTHA Carter, RNs (offgoing nurse). Report included the following information Nurse Handoff Report, Intake/Output, MAR, Recent Results, and Cardiac Rhythm SR-ST .   1935: Assessment completed. Medicated with Fentanyl 25mcg IV for c/o RUQ /RLQ abd pain which is constant and sharp. Pain rated 7/10. Patient alert, oriented x 4. Moves all extremities. Lungs clear, diminished throughout. RA sats 100%. Abdomen soft, hypoactive bowel sounds BUQ, active bowel sounds BLQ. Reports + flatus. No BM since admission. Peripheral pulses  palpable. L arm midline with brisk blood return but positional. Isuprel infusing at 1.5mcg/min to maintain HR >90. Qtc 420.    2003: Verbal permission received from patient to give update to patient's father. Attempted to call Mr. Roly Boyd back ([phone]), no answer. Message left to return call. Patient drowsy, reports pain improved, 5/10.     2130: Patient c/o anxiety. Medicated with Valium 2.5mg IV x 1 per MAR. Labs drawn with ultrasound guidance.     2220: Patient unable to tolerate po potassium. Notified NP. Orders for KCL 10meq x 4 IV.    2235: C/o abd pain, rates 8/10 and nausea. Medicated with Fentanyl 25mg IV.     2330: Patient pulling off BP cuff and pulse oximeter, saying \"I just need a break.\" I'm in so much pain. Notified NP of continued R abd and shoulder pain and nausea. Orders for oxycodone, lidocaine patch and GI cocktail.     2353: Patient medicated as ordered above. Immediately vomited approx 100ml clear/milky fluid. Cool washcloth applied to back of neck. Assessment unchanged from prior assessment. VSS. SR -ST on monitor with intermittently occasional PVCs.    0000: Qtc 450.     0251: HR sustaining >110. Titrated Isuprel back to 1.5mcg/min.     0330: HR sustaining 80-90's.     0400: Qtc 440.    0430: Reassessment unchanged.    0500: Order placed through 
4 Eyes Skin Assessment     NAME:  Ruma Boyd  YOB: 1980  MEDICAL RECORD NUMBER:  [id number]    The patient is being assessed for  Admission    I agree that at least one RN has performed a thorough Head to Toe Skin Assessment on the patient. ALL assessment sites listed below have been assessed.      Areas assessed by both nurses:    Head, Face, Ears, Shoulders, Back, Chest, Arms, Elbows, Hands, Sacrum. Buttock, Coccyx, Ischium, Legs. Feet and Heels, and Under Medical Devices         Does the Patient have a Wound? No noted wound(s)       Ankit Prevention initiated by RN: Yes  Wound Care Orders initiated by RN: No    Pressure Injury (Stage 3,4, Unstageable, DTI, NWPT, and Complex wounds) if present, place Wound referral order by RN under : No    New Ostomies, if present place, Ostomy referral order under : No     Nurse 1 eSignature: Electronically signed by Divine Romero RN on 5/8/25 at 6:35 AM EDT    **SHARE this note so that the co-signing nurse can place an eSignature**    Nurse 2 eSignature: {Esignature:[id number]}   
4216 Writer spoke to Cli[employer] tech. Cardiac MRI was not done yesterday d/t patient being nauseated. MRI can (may) be done this Saturday, Sunday or next Tuesday. Patient and Dr. Lawrence made aware.    End of Shift Note    Bedside shift change report given to ANIA Olivera (oncoming nurse) by Maritza Boyle RN (offgoing nurse).  Report included the following information SBAR    Shift worked:  4209-0195     Shift summary and any significant changes:     Patient tearful, briefly, during the shift. Spiritual care consulted for emotional distress. C/o pain to right flank. PRN pain meds administered. Otherwise, no issues.     Concerns for physician to address:  Cardiac clearance     Zone phone for oncoming shift:          Activity:  Level of Assistance: Minimal assist, patient does 75% or more  Number times ambulated in hallways past shift: 0  Number of times OOB to chair past shift: 0    Cardiac:   Cardiac Monitoring: Yes      Cardiac Rhythm: Sinus rhythm    Access:  Current line(s): PIV     Genitourinary:   Urinary Status: Voiding, Bathroom privileges    Respiratory:   O2 Device: None (Room air)  Chronic home O2 use?: NO  Incentive spirometer at bedside: NO    GI:  Last BM (including prior to admit): 05/15/25  Current diet:  ADULT DIET; Regular; Low Fat (less than or equal to 50 gm/day); No Concentrated sweets  ADULT ORAL NUTRITION SUPPLEMENT; Breakfast, Dinner; Other Oral Supplement; ensure max protein, RD to drop off  Passing flatus: YES    Pain Management:   Patient states pain is manageable on current regimen: YES    Skin:  Ankit Scale Score: 19  Interventions: Wound Offloading (Prevention Methods): Bed, pressure reduction mattress, Pillows, Repositioning, Turning    Patient Safety:  Fall Risk: Nursing Judgement-Fall Risk High(Add Comments): Yes  Fall Risk Interventions  Nursing Judgement-Fall Risk High(Add Comments): Yes  Toilet Every 2 Hours-In Advance of Need: Yes  Hourly Visual Checks: Awake, In chair  Fall Visual 
4815-2813: report received from Ari. Patient alert and in bed. Patient endorses RUQ pain but refused PRN pain medication, wants to wait for breakfast before taking any medications. Assessment and vitals complete.   4815-7845: patient complained of nausea and had an episode of vomiting. MRI reschedule due to patient change in status.  6732-3883: Reassessment and vitals completed.   1810-8464: Bath and linen change.   0406-8091: reassessment and vitals completed.  1700- ambulate hallway  1900- report given to ANIA Chapman.  
5/19/2025        RE: Ruma Boyd         [address] [facility] [address]          To Whom It May Concern,      Due to medical reasons, Ruma Boyd  has been admitted to [facility] since 5/7/2025- present.  Due to ongoing treatment she will be here for few more days until stable to discharge home.          Sincerely,          Jeniffer Austin MD   
5/30/2025        RE: Ruma Boyd         [address] [address]          To Whom It May Concern,      Due to medical reasons, Ruma Boyd was hospitalized from 5/7 to 5/25.         Sincerely,          Corby Luna MD   
Asked pt about wearing cpap. States she \"sometimes wears one at home.\"  Was not interested in wearing one while she is here. Order discontinued.   
Attempted to schedule a PCP hospital follow up. Unable to reach the office and unable to leave a voicemail. Pending patient discharge. Maegan Su, Care Management Assistant   
Attempted to schedule hospital follow up PCP appointment. Office  will contact the patient with appointment information per office protocol. . Pending patient discharge. Maegan Su, Care Management Assistant   
Bedside shift change report given to ANIA Davidson (oncoming nurse) by ANIA Brower (offgoing nurse). Report included the following information Nurse Handoff Report.     
Bedside shift change report given to ANIA Davidson (oncoming nurse) by ANIA Brower (offgoing nurse). Report included the following information Nurse Handoff Report.     
Bedside shift change report given to ANIA Davidson (oncoming nurse) by ANIA Haskins (offgoing nurse). Report included the following information Nurse Handoff Report.     
Bedside shift change report given to ANIA Davidson (oncoming nurse) by ANIA Haskins (offgoing nurse). Report included the following information Nurse Handoff Report.     
Called to patients room by midline insertion nurse related to patient complaints of lightheadedness. Monitor showing HR 200s in what appears to be sustained vtach or torsades. Patient is not responding. Code initiated.   Patient transferred to CCU after ROSC.  
Callled father scott Boyd , unable to join   Left voicemail with instruction to call the ICU floor number   
Cardiology Progress Note      5/10/2025 11:28 AM    Admit Date: 5/7/2025          Subjective: Events noted. Now stable in ICU- on Isuprel          BP (!) 152/98   Pulse (!) 49   Temp 98.7 °F (37.1 °C)   Resp 16   Ht 1.7 m (5' 6.93\")   Wt 93 kg (205 lb 0.4 oz)   SpO2 95%   BMI 32.18 kg/m²   05/08 1901 - 05/10 0700  In: 3378.1 [I.V.:3094.1]  Out: 900 [Urine:900]        Objective:      Physical Exam:  VS asa imani  Chest CTA  Card RRR no gallop  Neuro awake and alert     Data Review:   Labs:    K 3.1  Mag 2.0     Qtc 482 0n EKG today       Telemetry: SR R 67       Assessment:     Torsades documented in the setting of QT prolongation, bradycardia, hypokalemia, hypomagnesemia.  Continues to have nonsustained VT with R-on-T initiation. S/p polymorphic VT with brief arrest earlier today   Nausea and vomiting.  Status post gastric sleeve.  Status post  shunt.  Full code.       Plan: Start Isuprel gtt. Aim for HR 80-90  Echo. If recurrent torsade will place temp pacer . It is puzzling she is still having Torsade with only mild hypokalemia and mild increase in Qtc   Echo to r/o underlying cardiomyopathy              
Cardiology Progress Note      5/11/2025 10:15 AM    Admit Date: 5/7/2025          Subjective: Still having brief Torsade.  Echo showed nl EF and valves. Still with N/V          BP (!) 156/116   Pulse 90   Temp 97.8 °F (36.6 °C) (Oral)   Resp 13   Ht 1.7 m (5' 6.93\")   Wt 93 kg (205 lb 0.4 oz)   SpO2 96%   BMI 32.18 kg/m²   05/09 1901 - 05/11 0700  In: 3796.6 [I.V.:3077.1]  Out: 500 [Urine:500]        Objective:      Physical Exam:      Data Review:   Labs:    Mag 2.5  K 3.6   Lactic acid 1.1     Telemetry: SR R 70       Assessment:     Torsades documented in the setting of QT prolongation, bradycardia, hypokalemia, hypomagnesemia.  Continues to have nonsustained VT with R-on-T initiation. S/p polymorphic VT with brief arrest earlier today Nl EF and valves on echo   Nausea and vomiting.  Status post gastric sleeve.  Status post  shunt.  Full code.  Hypokalemia  Elevated BP       Plan:  Cont Isuprel to maintain HR around 90. Cont to replace K+, Mag++. Continued Torsade probably due to intracellular electrolyte depletion              
Chart reviewed and spoke with RN. The patient is going to CT at this time. Observed walking in room with arm sling with good standing balance, as she made her way to wheelchair for transport to CT. Once medically stable, patient is cleared to CO home from PT standpoint. If she is not discharged, PT will follow-up Tuesday. Please notify PT department if patient's functional status declines prior to discharge.    Dean Astudillo, PT    
Chart reviewed for IR consult  
Completed ICD scan and transmitted.    
Comprehensive Nutrition Assessment    Type and Reason for Visit:  Initial, LOS    Nutrition Recommendations/Plan:   Continue low fat diet, no concentrated sweets  D/c Ensure max, pt not consuming  Please document % meals and supplements consumed in flowsheet I/O's under intake   Continue post-bariatric surgery vitamin supplements     Malnutrition Assessment:  Malnutrition Status:  Insufficient data (05/16/25 1236)    Context:  Acute Illness     Findings of the 6 clinical characteristics of malnutrition:  Energy Intake:  Unable to assess  Weight Loss:  Unable to assess     Body Fat Loss:  Unable to assess     Muscle Mass Loss:  Unable to assess    Fluid Accumulation:  Unable to assess     Strength:  Not Performed    Nutrition Assessment:     Chart reviewed and case discussed during IDR. Pt reports her appetite is doing well and she is prioritizing meal intake as instructed s/p gastric sleeve. She has not been drinking the Ensure max shakes previously provided. No acute nutrition concerns at this time.     Patient Vitals for the past 120 hrs:   PO Meals Eaten (%)   05/16/25 1050 51 - 75%       Nutrition Related Findings:    Labs: Na 134.   Meds: bariatric fusion chew, calcitrate, protonix, ursodiol, vit D, roxicodone.   BM 5/15.   Wound Type: None       Current Nutrition Intake & Therapies:    Average Meal Intake: Unable to assess  Average Supplements Intake: Unable to assess  ADULT DIET; Regular; Low Fat (less than or equal to 50 gm/day); No Concentrated sweets  ADULT ORAL NUTRITION SUPPLEMENT; Breakfast, Dinner; Other Oral Supplement; ensure max protein, RD to drop off    Anthropometric Measures:  Height: 170 cm (5' 6.93\")  Ideal Body Weight (IBW): 135 lbs (61 kg)       Current Body Weight: 94.7 kg (208 lb 12.4 oz), 154.6 % IBW. Weight Source: Standing scale  Current BMI (kg/m2): 32.8           Weight Adjustment For: No Adjustment                 BMI Categories: Obese Class 1 (BMI 30.0-34.9)    Estimated Daily 
Comprehensive Nutrition Assessment    Type and Reason for Visit:  Reassess    Nutrition Recommendations/Plan:   Continue current diet  Continue bariatric vitamin/mineral supplements  Resume Ensure high protein BID  Please document % meals and supplements consumed in flowsheet I/O's under intake      Malnutrition Assessment:  Malnutrition Status:  Insufficient data (05/16/25 1236)    Context:  Acute Illness     Findings of the 6 clinical characteristics of malnutrition:  Energy Intake:  Unable to assess  Weight Loss:  Unable to assess     Body Fat Loss:  Unable to assess     Muscle Mass Loss:  Unable to assess    Fluid Accumulation:  Unable to assess     Strength:  Not Performed    Nutrition Assessment:     Chart reviewed and case discussed during IDR. Pt seen up in the chair during visit. She reports her appetite is doing fine, but she gets nauseated after eating too quickly. RD re-emphasized prioritization of protein first at meals, then veggies, minimizing bread intake. She would like to try the Ensure HP shakes to help optimize nutrition for her ICD placement tomorrow. Otherwise no new nutrition concerns identified at this time.     Patient Vitals for the past 120 hrs:   PO Meals Eaten (%)   05/19/25 1750 51 - 75%   05/18/25 1804 51 - 75%   05/18/25 1258 26 - 50%   05/18/25 0844 51 - 75%     Wt Readings from Last 5 Encounters:   05/21/25 93.3 kg (205 lb 11 oz)   05/17/25 94.7 kg (208 lb 12.4 oz)   ]    Nutrition Related Findings:    Labs: reviewed.   Meds: bariatric fusion chew, calcitrate, protonix, glycolax, ursodiol, vit D.   BM 5/19.   Wound Type: None       Current Nutrition Intake & Therapies:    Average Meal Intake: 51-75%  Average Supplements Intake: None Ordered  DIET ONE TIME MESSAGE;  Diet NPO  ADULT ORAL NUTRITION SUPPLEMENT; Dinner, Breakfast; Low Calorie/High Protein Oral Supplement  ADULT DIET; Regular; Chocolate Ensure high protein    Anthropometric Measures:  Height: 170 cm (5' 6.93\")  Ideal 
Consult done, full note to follow.  Nausea, vomiting.  Sinus bradycardia with competing ectopic atrial rhythm.  Prolonged Qtc noted, captured an episode of Torsades (see hospitalist H&P).  Mag given,  QT prolonging agents avoided.  No indication for emergent pacing at this time.    Hiram Pritchard MD    
Cosign documentation for Maegan Brice RN.   
End of Shift Note        Bedside shift report given to  ANIA Carmona. Report included the following information Nurse Handoff Report, MAR, Telemetry status, Recent Results, and plan of care. Oncoming RN verbalized understanding of plan of care with opportunity for clarification and questions. Dual skin check performed at this time.       Shift worked:  0700 - 1930     Shift summary and any significant changes:     Enema completed per patient request/MD order. Large result. Diet upgraded to regular. No ICD placement today. Refused 1500 VS; md aware.     Concerns for physician to address:  ICD placement tomorrow?     Zone phone for oncoming shift:          Activity:  Level of Assistance: Independent  Number times ambulated in hallways past shift: 3  Number of times OOB to chair past shift: 2    Cardiac:   Cardiac Monitoring: Yes      Cardiac Rhythm: Sinus rhythm    Access:  Current line(s): midline    Genitourinary:   Urinary Status: Voiding    Respiratory:   O2 Device: None (Room air)  Chronic home O2 use?: N/A  Incentive spirometer at bedside: N/A    GI:  Last BM (including prior to admit): 05/05/25  Current diet:  Diet NPO Exceptions are: Sips of Water with Meds  Passing flatus: YES    Pain Management:   Patient states pain is manageable on current regimen: YES    Skin:  Ankit Scale Score: 20  Interventions: Wound Offloading (Prevention Methods): Bed, pressure reduction mattress    Patient Safety:  Fall Risk: Nursing Judgement-Fall Risk High(Add Comments): No  Fall Risk Interventions  Nursing Judgement-Fall Risk High(Add Comments): No  Toilet Every 2 Hours-In Advance of Need: Yes  Hourly Visual Checks: Awake, In bed  Fall Visual Posted: Socks, Fall sign posted  Room Door Open: Yes  Alarm On: Bed, Chair  Patient Moved Closer to Nursing Station: No    Active Consults:   IP CONSULT TO CARDIOLOGY  IP CONSULT TO GI  IP CONSULT TO VASCULAR ACCESS TEAM  IP CONSULT TO VASCULAR ACCESS TEAM  IP CONSULT TO VASCULAR ACCESS 
End of Shift Note    Bedside shift change report given to  (oncoming nurse) by Evita Bentley RN (offgoing nurse).  Report included the following information SBAR    Shift worked:  9270-6061     Shift summary and any significant changes:     No significant changes this shift. Pt received PRN xanax.  Minimal out put from drain.     Concerns for physician to address:       Zone phone for oncoming shift:   [phone]       Activity:  Level of Assistance: Independent  Number times ambulated in hallways past shift: 0  Number of times OOB to chair past shift: 0    Cardiac:   Cardiac Monitoring: Yes      Cardiac Rhythm: Sinus tachy    Access:  Current line(s): midline    Genitourinary:   Urinary Status: Voiding    Respiratory:   O2 Device: None (Room air)  Chronic home O2 use?: NO  Incentive spirometer at bedside: NO    GI:  Last BM (including prior to admit): 05/19/25  Current diet:  ADULT DIET; Regular  DIET ONE TIME MESSAGE;  Passing flatus: YES    Pain Management:   Patient states pain is manageable on current regimen: YES    Skin:  Ankit Scale Score: 22  Interventions: Wound Offloading (Prevention Methods): Bed, pressure reduction mattress, Elevate heels, Pillows, Repositioning    Patient Safety:  Fall Risk: Nursing Judgement-Fall Risk High(Add Comments): No  Fall Risk Interventions  Nursing Judgement-Fall Risk High(Add Comments): No  Toilet Every 2 Hours-In Advance of Need: Yes  Hourly Visual Checks: Awake, In chair  Fall Visual Posted: Socks, Fall sign posted  Room Door Open: Deferred to decrease stimulation  Alarm On: Other (Comment) (bed alarm refusal signed)  Patient Moved Closer to Nursing Station: No    Active Consults:   IP CONSULT TO CARDIOLOGY  IP CONSULT TO GI  IP CONSULT TO VASCULAR ACCESS TEAM  IP CONSULT TO VASCULAR ACCESS TEAM  IP CONSULT TO VASCULAR ACCESS TEAM  IP CONSULT TO NEUROSURGERY  IP CONSULT TO SPIRITUAL CARE  IP CONSULT TO INTERVENTIONAL RADIOLOGY    Length of Stay:  Expected LOS: 15  Actual LOS: 
End of Shift Note    Bedside shift change report given to  (oncoming nurse) by Kristine Bruce RN (offgoing nurse).  Report included the following information SBAR and Cardiac Rhythm SR    Shift worked:  1900 - 0730     Shift summary and any significant changes:    No changes in pt status throughout the shift.  HR remained in SR and minimal output from drain.   Concerns for physician to address:    Zone phone for oncoming shift:         Kristine Bruce RN                            
End of Shift Note    Bedside shift change report given to *** (oncoming nurse) by Maegan Brice RN (offgoing nurse).  Report included the following information SBAR, ED Summary, Intake/Output, MAR, Recent Results, and Cardiac Rhythm AV paced    Shift worked:  1900-0730     Shift summary and any significant changes:     ***     Concerns for physician to address:  ***     Zone phone for oncoming shift:   ***           Maegan Brice RN      
End of Shift Note    Bedside shift change report given to ANIA Brower (oncoming nurse) by Stuart Alvarez RN (offgoing nurse).  Report included the following information SBAR    Shift worked:  7a-7p     Shift summary and any significant changes:     Patient had no significant changes this shift     Concerns for physician to address:       Zone phone for oncoming shift:   [phone]       Activity:  Level of Assistance: Independent  Number times ambulated in hallways past shift: 0  Number of times OOB to chair past shift: 3    Cardiac:   Cardiac Monitoring: Yes      Cardiac Rhythm: Ventricular paced    Access:  Current line(s): midline    Genitourinary:   Urinary Status: Voiding, Bathroom privileges    Respiratory:   O2 Device: None (Room air)  Chronic home O2 use?: NO  Incentive spirometer at bedside: NO    GI:  Last BM (including prior to admit): 05/22/25  Current diet:  ADULT ORAL NUTRITION SUPPLEMENT; AM Snack; Low Calorie/High Protein Oral Supplement  ADULT DIET; Regular  DIET ONE TIME MESSAGE;  DIET ONE TIME MESSAGE;  ADULT ORAL NUTRITION SUPPLEMENT; Breakfast, Dinner; Low Calorie/High Protein Oral Supplement  Passing flatus: YES    Pain Management:   Patient states pain is manageable on current regimen: YES    Skin:  Ankit Scale Score: 21  Interventions: Wound Offloading (Prevention Methods): Repositioning    Patient Safety:  Fall Risk: Nursing Judgement-Fall Risk High(Add Comments): No  Fall Risk Interventions  Nursing Judgement-Fall Risk High(Add Comments): No  Toilet Every 2 Hours-In Advance of Need: No (Comment)  Hourly Visual Checks: Awake  Fall Visual Posted: Socks  Room Door Open: Yes  Alarm On: Other (Comment)  Patient Moved Closer to Nursing Station: No    Active Consults:   IP CONSULT TO CARDIOLOGY  IP CONSULT TO GI  IP CONSULT TO VASCULAR ACCESS TEAM  IP CONSULT TO VASCULAR ACCESS TEAM  IP CONSULT TO VASCULAR ACCESS TEAM  IP CONSULT TO NEUROSURGERY  IP CONSULT TO SPIRITUAL CARE  IP CONSULT TO 
End of Shift Note    Bedside shift change report given to ANIA Davidson (oncoming nurse) by Leonarda Preciado RN (offgoing nurse).  Report included the following information SBAR, Kardex, Intake/Output, MAR, Recent Results, Med Rec Status, Alarm Parameters , and Quality Measures    Shift worked:  Night     Shift summary and any significant changes:     Patient is A/O x4, alert and oriented, and fully compliant with the treatment plan. Denies any current distress. The RUQ drain output was <20 mL during the shift, with one flush performed. Patient experienced a single episode of severe anxiety, with HR reaching 150s bpm, though it was unsustained and resolved with PRN medication. Pain and anxiety were effectively managed with prescribed meds. VS remained WNL throughout the shift. No further complications noted.     Concerns for physician to address:  Further evaluation/observation for 24hrs before discharge     Zone phone for oncoming shift:   [phone]       Activity:  Level of Assistance: Independent  Number times ambulated in hallways past shift: 0  Number of times OOB to chair past shift: 3    Cardiac:   Cardiac Monitoring: Yes      Cardiac Rhythm: Ventricular paced    Access:  Current line(s): midline    Genitourinary:   Urinary Status: Voiding, Bathroom privileges    Respiratory:   O2 Device: None (Room air)  Chronic home O2 use?: NO  Incentive spirometer at bedside: YES    GI:  Last BM (including prior to admit): 05/22/25  Current diet:  ADULT ORAL NUTRITION SUPPLEMENT; AM Snack; Low Calorie/High Protein Oral Supplement  ADULT DIET; Regular  DIET ONE TIME MESSAGE;  DIET ONE TIME MESSAGE;  ADULT ORAL NUTRITION SUPPLEMENT; Breakfast, Dinner; Low Calorie/High Protein Oral Supplement  Passing flatus: YES    Pain Management:   Patient states pain is manageable on current regimen: YES    Skin:  Ankit Scale Score: 21  Interventions: Wound Offloading (Prevention Methods): Repositioning    Patient Safety:  Fall Risk: Nursing 
End of Shift Note    Bedside shift change report given to ANIA Haskins (oncoming nurse) by Leni Chatman RN (offgoing nurse).  Report included the following information SBAR, Procedure Summary, Intake/Output, MAR, Recent Results, and Alarm Parameters     Shift worked:  2353-0374       Shift summary and any significant changes:     Continues to hve nausea/ vomiting. Patient seen by GI, no further input at this time. Try to refrain from using qt prolonging drugs per cards. Contiue with IV fluids and symptom management.      Concerns for physician to address:  Patient still dry heaving     Zone phone for oncoming shift:   [phone]         Activity:     Number times ambulated in hallways past shift: 0  Number of times OOB to chair past shift: 1    Cardiac:   Cardiac Monitoring: Yes      Cardiac Rhythm: Sinus rhythm, Prolonged QT    Access:  Current line(s): PIV   Ultra sound gudied  Genitourinary:   Urinary Status: Voiding    Respiratory:   O2 Device: Nasal cannula  Chronic home O2 use?: NO  Incentive spirometer at bedside: NO    GI:  Last BM (including prior to admit): 05/07/25  Current diet:  ADULT DIET; Regular; GI [place] (GERD/Peptic Ulcer)  Passing flatus: YES    Pain Management:   Patient states pain is manageable on current regimen: NO    Skin:  Ankit Scale Score: 21  Interventions: Wound Offloading (Prevention Methods): Repositioning, Pillows, Bed, pressure reduction mattress    Patient Safety:  Fall Risk:    Fall Risk Interventions  Toilet Every 2 Hours-In Advance of Need: Yes  Hourly Visual Checks: Eyes closed, In bed  Fall Visual Posted: Armband, Fall sign posted, Socks  Room Door Open: Deferred to decrease stimulation  Alarm On: Bed  Patient Moved Closer to Nursing Station: No    Active Consults:   IP CONSULT TO CARDIOLOGY  IP CONSULT TO GI    Length of Stay:  Expected LOS: 4  Actual LOS: 1    Leni Chatman RN                            
End of Shift Note    Bedside shift change report given to ANIA Haskins (oncoming nurse) by Stuart Alvarez RN (offgoing nurse).  Report included the following information SBAR    Shift worked:  7a-7p     Shift summary and any significant changes:     Patient QTC still elevated. Frrequent bursts of vtach noted. Valium PRN administered for patients anxiety.     Concerns for physician to address:  nausea     Zone phone for oncoming shift:          Activity:     Number times ambulated in hallways past shift: 0  Number of times OOB to chair past shift: 0    Cardiac:   Cardiac Monitoring: Yes      Cardiac Rhythm: Sinus patricia, Prolonged QT    Access:  Current line(s): midline    Genitourinary:   Urinary Status: Voiding, Bathroom privileges    Respiratory:   O2 Device: None (Room air)  Chronic home O2 use?: NO  Incentive spirometer at bedside: NO    GI:  Last BM (including prior to admit): 05/07/25  Current diet:  ADULT DIET; Full Liquid; GI [place] (GERD/Peptic Ulcer)  Passing flatus: YES    Pain Management:   Patient states pain is manageable on current regimen: NO    Skin:  Ankit Scale Score: 20  Interventions: Wound Offloading (Prevention Methods): Repositioning    Patient Safety:  Fall Risk:    Fall Risk Interventions  Toilet Every 2 Hours-In Advance of Need: Yes  Hourly Visual Checks: Awake, In bed  Fall Visual Posted: Armband, Fall sign posted, Socks  Room Door Open: Deferred to decrease stimulation  Alarm On: Bed  Patient Moved Closer to Nursing Station: No    Active Consults:   IP CONSULT TO CARDIOLOGY  IP CONSULT TO GI  IP CONSULT TO VASCULAR ACCESS TEAM    Length of Stay:  Expected LOS: 4  Actual LOS: 2    Stuart Alvarez RN                            
End of Shift Note    Bedside shift change report given to ANIA Meier (oncoming nurse) by Divine Romero RN (offgoing nurse).  Report included the following information SBAR    Shift worked:  5809-3808     Shift summary and any significant changes:     Pt still experiencing intractable N/V, Qtc remains prolonged so availability of antiemetics remains a challenge. Ordered Scopolamine patch which is behind right ear. It has been difficult maintaining IV access, pt has lost 5 PIVs in 24hrs, and can not tolerate PO at all. midline was placed @ 0400. Started on Ceftriaxone and resumed fluids. Still experiencing    Concerns for physician to address:        Zone phone for oncoming shift:          Activity:     Number times ambulated in hallways past shift: 0  Number of times OOB to chair past shift: 0    Cardiac:   Cardiac Monitoring: Yes      Cardiac Rhythm: Sinus rhythm, Sinus patricia, Prolonged QT    Access:  Current line(s): midline    Genitourinary:   Urinary Status: Voiding    Respiratory:   O2 Device: None (Room air)  Chronic home O2 use?: N/A  Incentive spirometer at bedside: NO    GI:  Last BM (including prior to admit): 05/07/25  Current diet:  ADULT DIET; Full Liquid; GI [place] (GERD/Peptic Ulcer)  Passing flatus: YES    Pain Management:   Patient states pain is manageable on current regimen: YES    Skin:  Ankit Scale Score: 21  Interventions: Wound Offloading (Prevention Methods): Repositioning, Pillows    Patient Safety:  Fall Risk:    Fall Risk Interventions  Toilet Every 2 Hours-In Advance of Need: Yes  Hourly Visual Checks: Awake, In bed  Fall Visual Posted: Armband, Fall sign posted, Socks  Room Door Open: Deferred to decrease stimulation  Alarm On: Bed  Patient Moved Closer to Nursing Station: No    Active Consults:   IP CONSULT TO CARDIOLOGY  IP CONSULT TO GI  IP CONSULT TO VASCULAR ACCESS TEAM    Length of Stay:  Expected LOS: 4  Actual LOS: 2    Divine Romero RN                            
End of Shift Note    Bedside shift change report given to ANIA Torres (oncoming nurse) by Evita Bentley RN (offgoing nurse).  Report included the following information SBAR    Shift worked:  9896-4691     Shift summary and any significant changes:     Pt refused calcium stating she prefers her chewable calcium at home.      Concerns for physician to address:       Zone phone for oncoming shift:   [phone]       Activity:  Level of Assistance: Independent  Number times ambulated in hallways past shift: 2  Number of times OOB to chair past shift: 2    Cardiac:   Cardiac Monitoring: Yes      Cardiac Rhythm: Sinus tachy    Access:  Current line(s): midline    Genitourinary:   Urinary Status: Voiding, Bathroom privileges    Respiratory:   O2 Device: None (Room air)  Chronic home O2 use?: NO  Incentive spirometer at bedside: NO    GI:  Last BM (including prior to admit): 05/22/25  Current diet:  Diet NPO  Passing flatus: YES    Pain Management:   Patient states pain is manageable on current regimen: NO    Skin:  Ankit Scale Score: 20  Interventions: Wound Offloading (Prevention Methods): Bed, pressure reduction mattress, Repositioning    Patient Safety:  Fall Risk: Nursing Judgement-Fall Risk High(Add Comments): No  Fall Risk Interventions  Nursing Judgement-Fall Risk High(Add Comments): No  Toilet Every 2 Hours-In Advance of Need: Yes  Hourly Visual Checks: Awake, In chair  Fall Visual Posted: Socks, Fall sign posted  Room Door Open: Deferred to decrease stimulation  Alarm On: Other (Comment) (bed alarm refusal signed)  Patient Moved Closer to Nursing Station: No    Active Consults:   IP CONSULT TO CARDIOLOGY  IP CONSULT TO GI  IP CONSULT TO VASCULAR ACCESS TEAM  IP CONSULT TO VASCULAR ACCESS TEAM  IP CONSULT TO VASCULAR ACCESS TEAM  IP CONSULT TO NEUROSURGERY  IP CONSULT TO SPIRITUAL CARE  IP CONSULT TO INTERVENTIONAL RADIOLOGY    Length of Stay:  Expected LOS: 16  Actual LOS: 15    Evita Bentley RN                         
End of Shift Note    Bedside shift change report given to Bob Wilkerson RN (oncoming nurse) by Christine Brock RN (offgoing nurse).  Report included the following information SBAR, Kardex, ED Summary, Procedure Summary, Intake/Output, MAR, Accordion, Recent Results, Med Rec Status, Cardiac Rhythm A-Paced, AV Paced, V-Paced, and Alarm Parameters     Shift worked:  8873-1308     Shift summary and any significant changes:     No significant change.  Vital signs are stable.  Pt. Is resting in bed without distress.  No bleeding/ no hematoma noted to procedure sites.     Concerns for physician to address:       Zone phone for oncoming shift:          Activity:  Level of Assistance: Independent  Number times ambulated in hallways past shift: 2  Number of times OOB to chair past shift: 2    Cardiac:   Cardiac Monitoring: Yes      Cardiac Rhythm: Ventricular paced    Access:  Current line(s): PIV     Genitourinary:   Urinary Status: Voiding, Bathroom privileges    Respiratory:   O2 Device: None (Room air)  Chronic home O2 use?: NO  Incentive spirometer at bedside: YES    GI:  Last BM (including prior to admit): 05/22/25  Current diet:  ADULT ORAL NUTRITION SUPPLEMENT; AM Snack; Low Calorie/High Protein Oral Supplement  ADULT DIET; Regular  DIET ONE TIME MESSAGE;  DIET ONE TIME MESSAGE;  ADULT ORAL NUTRITION SUPPLEMENT; Breakfast, Dinner; Low Calorie/High Protein Oral Supplement  Passing flatus: YES    Pain Management:   Patient states pain is manageable on current regimen: YES    Skin:  Ankit Scale Score: 21  Interventions: Wound Offloading (Prevention Methods): Bed, pressure reduction mattress    Patient Safety:  Fall Risk: Nursing Judgement-Fall Risk High(Add Comments): No  Fall Risk Interventions  Nursing Judgement-Fall Risk High(Add Comments): No  Toilet Every 2 Hours-In Advance of Need: Yes  Hourly Visual Checks: Awake, In bed  Fall Visual Posted: Socks, Fall sign posted  Room Door Open: Deferred to decrease 
End of Shift Note    Bedside shift change report given to Ed RN (oncoming nurse) by Alex Mendieta RN (offgoing nurse).  Report included the following information SBAR, Kardex, ED Summary, MAR, Accordion, Recent Results, Med Rec Status, and Cardiac Rhythm SR    Shift worked:  7a-12p     Shift summary and any significant changes:     See previous note     Concerns for physician to address:  na     Zone phone for oncoming shift:   na       Activity:  Level of Assistance: Minimal assist, patient does 75% or more  Number times ambulated in hallways past shift: 0  Number of times OOB to chair past shift: 1    Cardiac:   Cardiac Monitoring: Yes      Cardiac Rhythm: Sinus rhythm    Access:  Current line(s): PIV     Genitourinary:   Urinary Status: Voiding    Respiratory:   O2 Device: None (Room air)  Chronic home O2 use?: NO  Incentive spirometer at bedside: NO    GI:  Last BM (including prior to admit): 05/15/25  Current diet:  ADULT DIET; Regular; Low Fat (less than or equal to 50 gm/day); No Concentrated sweets  ADULT ORAL NUTRITION SUPPLEMENT; Breakfast, Dinner; Other Oral Supplement; ensure max protein, RD to drop off  Passing flatus: YES    Pain Management:   Patient states pain is manageable on current regimen: YES    Skin:  Ankit Scale Score: 20  Interventions: Wound Offloading (Prevention Methods): Bed, pressure reduction mattress, Pillows, Repositioning, Turning    Patient Safety:  Fall Risk: Nursing Judgement-Fall Risk High(Add Comments): Yes  Fall Risk Interventions  Nursing Judgement-Fall Risk High(Add Comments): Yes  Toilet Every 2 Hours-In Advance of Need: Yes  Hourly Visual Checks: Awake, In chair  Fall Visual Posted: Armband, Fall sign posted, Socks  Room Door Open: Yes  Alarm On: Bed  Patient Moved Closer to Nursing Station: No    Active Consults:   IP CONSULT TO CARDIOLOGY  IP CONSULT TO GI  IP CONSULT TO VASCULAR ACCESS TEAM  IP CONSULT TO VASCULAR ACCESS TEAM  IP CONSULT TO VASCULAR ACCESS 
End of Shift Note    Bedside shift report given to  ANIA Brush. Report included the following information Nurse Handoff Report, MAR, Telemetry status, Recent Results, and plan of care. Oncoming RN verbalized understanding of plan of care with opportunity for clarification and questions. Dual skin check performed at this time.       Shift worked:  0700 - 1930     Shift summary and any significant changes:     Bed alarm refusal signed and in chart. Patient ambulated in hallways. NPO at midnight; anticipate placement of defibrillator.    Concerns for physician to address:  none   Zone phone for oncoming shift:        Activity:  Level of Assistance: Independent  Number times ambulated in hallways past shift: 3  Number of times OOB to chair past shift: 0    Cardiac:   Cardiac Monitoring: Yes      Cardiac Rhythm: Sinus rhythm    Access:  Current line(s): midline    Genitourinary:   Urinary Status: Voiding    Respiratory:   O2 Device: None (Room air)  Chronic home O2 use?: NO  Incentive spirometer at bedside: NO    GI:  Last BM (including prior to admit): 05/05/25  Current diet:  ADULT DIET; Regular; Low Fat (less than or equal to 50 gm/day); No Concentrated sweets  ADULT ORAL NUTRITION SUPPLEMENT; Breakfast, Dinner; Other Oral Supplement; ensure max protein, RD to drop off  Diet NPO Exceptions are: Sips of Water with Meds  Passing flatus: YES    Pain Management:   Patient states pain is manageable on current regimen: YES    Skin:  Ankit Scale Score: 20  Interventions: Wound Offloading (Prevention Methods): Bed, pressure reduction mattress    Patient Safety:  Fall Risk: Nursing Judgement-Fall Risk High(Add Comments): No  Fall Risk Interventions  Nursing Judgement-Fall Risk High(Add Comments): No  Toilet Every 2 Hours-In Advance of Need: Yes  Hourly Visual Checks: Awake, In bed  Fall Visual Posted: Socks, Fall sign posted  Room Door Open: Yes  Alarm On: Bed, Chair  Patient Moved Closer to Nursing Station: No    Active 
Evaluated Midline due to call stating Midline placed this morning was bloody and not drawing blood.  Upon arrival, dressing with coagulated blood.  Scanned vessel and able to visualize catheter in vessel.  Flushes well without  pain or leaking.  Dressing changed.        Timeout performed at the bedside with nurse.  Post-procedure hand-off given to ANIA Davidson.    Bed returned to low, locked position with call bell in reach.    
I have reviewed discharge instructions with the patient. The patient verbalized understanding. Discharge medications reviewed with patient and appropriate educational materials and side effects teaching were provided. Follow-up appointments reviewed. Opportunity for questions and clarification was provided.  Venous access removed without difficulty.  Patient's belongings gathered and sent with patient. Patient is ready for discharge.     Stuart Alvarez RN    
MRI PENDING    Completion of MRI Screening Sheet    Fax to [phone] when completed    Please call [phone]  When this is done    Thank You  
Midline Insertion Procedure Note    Procedure: Insertion of #4 FR/18G Midline    Indications:  Poor Access    Procedure Details    Risks of hemorrhage, and adverse drug reaction were discussed.  Vessel assessment revealed compressible well defined vessels.  Multiple sticks noted to BUE prior to PICC RN arrival. UE Midline placed without complication for patient.  2ml of Lidocaine 1% administered via infiltration prior to Midline insertion.  Time-Out performed at bedside with ANIA Martin.    #4 FR/18G Midline inserted to the left brachial vein per hospital protocol.   Blood return:  yes    Catheter length 15 cm, with 0 cm exposed. Mid upper arm circumference is 44 cm.   Catheter was flushed with 20 cc NS. Patient did tolerate procedure well. Upon completion of procedure, just as getting ready for dressing, pt reports feeling dizzy and not feeling well at all .Noted VT on Monitor.Yelled out into hayden for help and nurse arrive and called Code.   Able to cover Midline with dressing but not securement device as pt lost pulse and CPR was started.   Labs were drawn and handed to nurse.       all MIDLINE kit components accounted for and intact.  Post Procedure hand-off given to ANIA Shrestha.      
Ms Boyd says she is still having nasuea and vomiting and is not going to wear Resmed auto pap tonight.  
Nurse for room 2319 contacted Nocturnist/cross cover provider via non-urgent messaging system [employer] and notified patient having persistent nausea, has qtc prolongation and bradycardia is reason why limited on selection for antiemetics, zofran prn already in, but received and not effective relief reported, has tried sips of gingerale, seen by gi today w/no additional RECs and they signed off, cards seen also for qtc prolongation, rhythm changes and bradycardia, pt has known gastroparesis, ?THC inducted n/v, asking for additional meds for n/v. No other concerns reported. No acute distress reported. No other information provided by nurse. VSS. Patient denies any further complaints or concerns. See prior hospitalist group notes for complete details of course of treatment. Recent lab work and documented vs reviewed.    Ordered scopolamine x1 patch after d/w pharmacist Basile for shared MDM as is very limited on what is available to give with qtc prolongation and rhythm changes and bradycardia for n/v, continue gingerale. Appreciate Nursing assistance in the care of this patient.    Continue remaining plan/orders as per dayshift team. Will defer further evaluation/management and timing of discontinuation of regimens to the day shift primary attending care team. Nursing to notify dayshift Hospitalist team in the AM of overnight events and for further/continued concerns. Will remain available overnight cross coverage for further concerns if nursing/patient needs. Please note, there are RRT systems in this hospital in place that if nursing has acute or critical patient condition change or concern, this is to help facilitate and notify that patient needs immediate bedside evaluation by a provider.    Non-billable note.       
OCCUPATIONAL THERAPY EVALUATION/DISCHARGE  Patient: Ruma Boyd (45 y.o. female)  Date: 5/21/2025  Primary Diagnosis: Arrhythmia [I49.9]  Abnormal EKG [R94.31]  Intractable vomiting [R11.10]  Procedure(s) (LRB):  Insert ICD dual (N/A)       Precautions: Fall Risk                  ASSESSMENT :  Based on the objective data below, the patient is functioning close to her baseline for ADLs and functional mobility. Patient received semisupine in bed with RN present in room and cleared for therapy. Noted plan for ICD placement 5/22 and education provided at length on precautions and compensatory techniques for ADLs. Patient demonstrated back with independence to mod I, tolerating task well. Patient walked in hallway with PT without AD and tolerated task with no rest breaks. HR increased to 135bpm with activity but recovered into 90s with rest break. She demonstrated all transfers without pushing through LUE and completed ADLs while maintaining precautions. She was left sitting in recliner chair with all needs met, VSS. She has no additional acute OT needs at this time.     Functional Outcome Measure:  The patient scored 24/24 on the [facility] outcome measure which is indicative of patient is functioning at her baseline for ADLs.      Further skilled acute occupational therapy is not indicated at this time.     PLAN :  Recommend with staff: OOB for all meals, supervision to bathroom without AD    Recommendation for discharge: (in order for the patient to meet his/her long term goals):   No skilled occupational therapy    Other factors to consider for discharge:  ICD placement scheduled 5/22    IF patient discharges home will need the following DME: none     SUBJECTIVE:   Patient stated, “I want to show you how I do my hair and see if that's okay.”    OBJECTIVE DATA SUMMARY:   History reviewed. No pertinent past medical history.  Past Surgical History:   Procedure Laterality Date    US ASP ABSCESS/HEMATOMA/BULLA/CYST  
Orders received, chart reviewed and patient evaluated by occupational therapy. Pending progression with skilled acute occupational therapy, recommend:    No skilled occupational therapy    Recommend with nursing patient to complete as able in order to maintain strength, endurance and independence: OOB to chair 3x/day, ADLs with independence to mod I and performing toileting with supervision to bathroom. Thank you for your assistance.     Full evaluation to follow.     Kate Ferris, OTR/L, OTD    
PT note:     Chart reviewed and noted patient currently off the floor for ICD placement. Will defer and continue to follow. Recommending HH vs none at discharge.     Priya Montes, PT, DPT    
Patient is saying that she has not received her multi vitamin today and that she gave that to her Nurse yesterday and that she took it yesterday. Called pharmacy to see if the medication had been verified and it was verified by pharmacy and a scan label applied to bottle. I looked on IVCU in the omnicell lock box and in the room the patient was transferred from and PCU omnicell lock box and unable to find patients medication. Patient was told that they will have to speak with the doctor in the AM about ordering her medication from pharmacy. Patient states that she wants to be reimbursed for lost medication.   
Portable CXR COMPLETED.  
Pt received from EP via bed with ANIA Farrell and CRNA, pt awake,drowsy and approp. VSS, report received. Lt subclavian incision site clean, dry and intact, ice pack to incision site. Called for Port chest XRAY.   
Pt with KAYLI, admitted with intractable nausea and vomiting. Unable to wear NIV currently due to this so discussed with ANIA Haskins and agreed tp place on NC for the flow factor. O2 sats were fine on [place] [place] RRT  
Rapid Response called for patient complaining of worsening abdominal pain and head pain.  Patient needing a doctor.  SPO2 99% HR 89 on room air.  Respiratory not needed at this time.  
Rapid response    Responded to rapid response to find patient in bedside recliner.  Agitated with complaints of headache unrelieved by prior therapeutic attempts (Tylenol, Fioricet) as well as complaint of progressively worsening right right sided abdominal pain predominantly around site of drainage catheter insertion.  She complains of worsened pain with deep inspiration as well as right hip flexion.  She voices significant frustration and reports she is considering transferring to [facility].  Nursing report that patient was describing severe pain smacking their arms away when they attempted to palpate her abdomen prompting them to call a rapid response.    On chart review, patient with complicated hospital course initially admitted by myself for intractable nausea vomiting with course complicated by cardiac arrest secondary to torsade de pointes while status post ICD.  Also found to have large subcapsular hepatic hematoma attributed to Michael device status post drainage catheter placement 5/13 with ongoing bloody output.  CT abdomen pelvis performed earlier today demonstrated interval decrease in subcapsular hepatic fluid collection with appropriately placed percutaneous catheter without other acute abnormalities noted.  On lab review noted to have uptrending leukocytosis from 12 => 17 with undetectable procalcitonin.  UA was reflexed to culture, but is notable for significant epithelial cells with only modestly elevated WBCs likely representing contaminant.    On exam:    Abdomen is soft and nondistended, moderately tender to palpation on right side of abdomen noting that this is improved when patient is distracted.  No peritoneal signs present.     Patient is neurovascularly intact and denies any weakness/numbness outside of pain in her right shoulder which has been ongoing and sensation of tension in the back of her neck.  She denies vision changes beyond baseline.      Plan:  Repeat clean-catch urinalysis to 
Request for PICC. Patient with a midline in each extremity but no blood return. Patient is a difficult stick and staff are required to do an arterial stick for blood work. Consent obtained for PICC from patient. Patient's left arm prepped and draped in sterile fashion. The basilic vein was used initially and was accessed but the guidewire would not advance. Brachial vein was attempted next and the guidewire was advanced successfully with the dilator placed as well but became dislodged. Another attempt was made on the brachial vein and the vein was accessed but the guidewire could not be threaded. Notified RN of failed attempt.  
She had a dual chamber ICD implanted.  At this point, from a cardiology standpoint can be discharged on 5/23 if her ICD checkt is OK.    Hiram Pritchard MD    
Spiritual Health History and Assessment/Progress Note  [facility]    Crisis,  ,  ,      Name: Ruma Boyd MRN: [id number]    Age: 45 y.o.     Sex: female   Language: English   Yarsani: Quaker   Arrhythmia     Date: 5/10/2025            Total Time Calculated: 25 min              Spiritual Assessment began in [facility] CRITICAL CARE        Referral/Consult From: Nurse   Encounter Overview/Reason: Crisis  Service Provided For: Patient    Fina, Belief, Meaning:   Patient identifies as spiritual, is connected with a fina tradition or spiritual practice, and has beliefs or practices that help with coping during difficult times  Family/Friends No family/friends present      Importance and Influence:  Patient has spiritual/personal beliefs that influence decisions regarding their health  Family/Friends No family/friends present    Community:  Patient feels well-supported. Support system includes: Children  Family/Friends No family/friends present    Assessment and Plan of Care:      received a CODE BLUE page for patient in room 2319. Patient requested a  after she was resuscitated. Nurse brought  into the patient room while they were providing care.  calmed the patient after encouraging her to recite the Lord's Prayer in unison. Prayer appeared to calm her. She shared her father's number and called him. She became upset again as she tried to share her health situation on the phone wit her dad. Nurse gave the patient's phone to the  because they were providing care for the patient.  advised her dad to come to the hospital and a doctor will return his call. Patient wan moved to CCU 2516.  asked the CCU nurse use perfect serve to send a message to the doctor for the patient's father.  received another page and had to leave.     Spiritual Health Services are available 24 hours a day as requested.     Patient Interventions include: 
Spiritual Health History and Assessment/Progress Note  [facility]    Follow-up,  ,  ,      Name: Ruma Boyd MRN: [id number]    Age: 45 y.o.     Sex: female   Language: English   Mormonism: Uatsdin   Arrhythmia     Date: 5/16/2025            Total Time Calculated: 89 min              Spiritual Assessment began in [facility] CARDIOPULMONARY CARE        Referral/Consult From: Patient   Encounter Overview/Reason: Follow-up  Service Provided For: Patient    Fina, Belief, Meaning:   Patient identifies as spiritual, is connected with a fina tradition or spiritual practice, and has beliefs or practices that help with coping during difficult times  Family/Friends No family/friends present      Importance and Influence:  Patient has spiritual/personal beliefs that influence decisions regarding their health  Family/Friends No family/friends present    Community:  Patient is connected with a spiritual community and feels well-supported. Support system includes: Parent/s and Extended family  Family/Friends No family/friends present    Assessment and Plan of Care:     Patient Interventions include: Facilitated expression of thoughts and feelings, Explored spiritual coping/struggle/distress, Engaged in theological reflection, and Affirmed coping skills/support systems  Family/Friends Interventions include: No family/friends present    Patient Plan of Care: Spiritual Care available upon further referral  Family/Friends Plan of Care: Spiritual Care available upon further referral    Electronically signed by BAUDILIO Gomez on 5/16/2025 at 2:27 PM     Rev. ESSIE Gomez, BAUDILIO  [facility]   Paging Service [phone])  
TRANSFER - OUT REPORT:    Verbal report given to ANIA Hoover on Ruma Boyd  being transferred to CCU for change in patient condition (pulseless vtach/torsades/cpr initiation)       Report consisted of patient's Situation, Background, Assessment and   Recommendations(SBAR).     Information from the following report(s) Nurse Handoff Report was reviewed with the receiving nurse.           Lines:       Opportunity for questions and clarification was provided.      Patient transported with:  Monitor, O2 @ 2lpm, and Registered Nurse        
TRANSFER - OUT REPORT:    Verbal report given to ANIA Simon on Ruma Boyd  being transferred to 2205 for routine progression of patient care       Report consisted of patient's Situation, Background, Assessment and   Recommendations(SBAR).     Information from the following report(s) Nurse Handoff Report was reviewed with the receiving nurse.           Lines:       Opportunity for questions and clarification was provided.      Patient transported with:  Monitor        
TRANSFER - OUT REPORT:    Verbal report given to Al,RN on Ruma Boyd  being transferred to PCU for routine progression of patient care       Report consisted of patient's Situation, Background, Assessment and   Recommendations(SBAR).     Information from the following report(s) Nurse Handoff Report, MAR, and Cardiac Rhythm V paced, AV paced  was reviewed with the receiving nurse.           Lines:       Opportunity for questions and clarification was provided.      Patient transported with:  Monitor and Registered Nurse       
Fentanyl 25 mcg IVP given for pain.     1217: PRN alprazolam 0.25 mg given for anxiety at patient's request.     1238: Lab called, Mg level came back >7. I told them that I will redraw the lab because it was drawn off of line where Mg replacement being given. Per Dr. Steve, we can place order for RT to do arterial stick to collect labs. RT advised.     1400: Qtc 430    1600: Shift reassessment completed, see Flowsheet for details. Isuprel infusing @ 1.5 mcg/min.     1607: PRN Fentanyl 25 mcg IVP given for pain.    1636: Dulcolax suppository given for constipation.     1738: Isuprel infusion weaned per MD order, see MAR for amount. Pt HR sustaining > 110.    1900: Bedside shift report given to ANIA Sainz/Melissa RN (oncoming nurses). Isuprel infusing @ 1 mcg/min.         
RADIOLOGY    Length of Stay:  Expected LOS: 12  Actual LOS: 10    Maximiliano Alvarez RN                            
HPI.       Vital Signs:    Last 24hrs VS reviewed since prior progress note. Most recent are:  Vitals:    05/15/25 1033   BP: 105/72   Pulse: 98   Resp: 18   Temp: 98.2 °F (36.8 °C)   SpO2: 94%         Intake/Output Summary (Last 24 hours) at 5/15/2025 1244  Last data filed at 5/15/2025 0815  Gross per 24 hour   Intake 100 ml   Output 750 ml   Net -650 ml        Physical Examination:     I had a face to face encounter with this patient and independently examined them on 5/15/2025 as outlined below:          General : alert x 3, awake, no acute distress,   HEENT: PEERL, EOMI, moist mucus membrane, TM clear  Neck: supple, no JVD, no meningeal signs  Chest: Clear to auscultation bilaterally   CVS: S1 S2 heard, Capillary refill less than 2 seconds  Abd: soft/ non tender, non distended, BS physiological,   Ext: no clubbing, no cyanosis, no edema, brisk 2+ DP pulses  Neuro/Psych: pleasant mood and affect, CN 2-12 grossly intact, sensory grossly within normal limit, Strength 5/5 in all extremities, DTR 1+ x 4  Skin: warm     Data Review:    Review and/or order of clinical lab test      I have personally and independently reviewed all pertinent labs, diagnostic studies, imaging, and have provided independent interpretation of the same.     Labs:     Recent Labs     05/14/25  0433 05/15/25  0528   WBC 11.4* 10.1   HGB 9.7* 10.1*   HCT 30.1* 30.9*    300     Recent Labs     05/13/25  1706 05/14/25  0433 05/15/25  0439   * 133* 136   K 4.2 3.7 3.6    100 100   CO2 25 27 29   BUN 8 7 7   MG  --   --  2.2   PHOS 3.9  --   --      Recent Labs     05/13/25  0407 05/14/25  0433 05/15/25  0439   * 177* 130*   GLOB 3.1 3.1 3.6     Recent Labs     05/13/25 1706   INR 1.0      No results for input(s): \"TIBC\" in the last 72 hours.    Invalid input(s): \"FE\", \"PSAT\", \"FERR\"   No results found for: \"RBCF\"   No results for input(s): \"PH\", \"PCO2\", \"PO2\" in the last 72 hours.  No results for input(s): \"CPK\" in the 
pantoprazole (PROTONIX) tablet 40 mg  40 mg Oral QAM AC    ALPRAZolam (XANAX) tablet 0.25 mg  0.25 mg Oral TID PRN    bisacodyl (DULCOLAX) suppository 10 mg  10 mg Rectal Daily PRN    capsaicin (ZOSTRIX) 0.025 % cream   Topical BID PRN    isoproterenol (ISUPREL) 1 mg in sodium chloride 0.9 % 250 mL infusion  0-10 mcg/min IntraVENous Continuous    alcohol 62% (NOZIN) nasal  1 ampule  1 ampule Nasal BID    sodium chloride flush 0.9 % injection 5-40 mL  5-40 mL IntraVENous 2 times per day    sodium chloride flush 0.9 % injection 5-40 mL  5-40 mL IntraVENous PRN    oxyCODONE (ROXICODONE) immediate release tablet 5 mg  5 mg Oral Q4H PRN    lidocaine 4 % external patch 1 patch  1 patch TransDERmal Daily    amLODIPine (NORVASC) tablet 5 mg  5 mg Oral Daily    hydrALAZINE (APRESOLINE) injection 10 mg  10 mg IntraVENous Q6H PRN    ursodiol (ACTIGALL) capsule 300 mg  300 mg Oral BID    0.9 % sodium chloride infusion   IntraVENous PRN    [Held by provider] enoxaparin (LOVENOX) injection 40 mg  40 mg SubCUTAneous Daily    polyethylene glycol (GLYCOLAX) packet 17 g  17 g Oral Daily PRN    acetaminophen (TYLENOL) tablet 650 mg  650 mg Oral Q6H PRN    Or    acetaminophen (TYLENOL) suppository 650 mg  650 mg Rectal Q6H PRN        CCM time: 40 mins. This time includes time spent reviewing database, interviewing and examining patient, discussing care plan on MDRs and communicating with other providers     Hiram Pritchard MD  5/13/2025    
      Signed: Corby Luna MD

## 2025-06-02 PROCEDURE — 99285 EMERGENCY DEPT VISIT HI MDM: CPT

## 2025-06-02 ASSESSMENT — PAIN SCALES - GENERAL: PAINLEVEL_OUTOF10: 8

## 2025-06-02 ASSESSMENT — PAIN DESCRIPTION - ORIENTATION: ORIENTATION: RIGHT

## 2025-06-02 ASSESSMENT — PAIN - FUNCTIONAL ASSESSMENT: PAIN_FUNCTIONAL_ASSESSMENT: 0-10

## 2025-06-02 ASSESSMENT — LIFESTYLE VARIABLES
HOW MANY STANDARD DRINKS CONTAINING ALCOHOL DO YOU HAVE ON A TYPICAL DAY: PATIENT DECLINED
HOW OFTEN DO YOU HAVE A DRINK CONTAINING ALCOHOL: PATIENT DECLINED

## 2025-06-02 ASSESSMENT — PAIN DESCRIPTION - LOCATION: LOCATION: ABDOMEN

## 2025-06-03 ENCOUNTER — APPOINTMENT (OUTPATIENT)
Facility: HOSPITAL | Age: 45
End: 2025-06-03
Payer: MEDICAID

## 2025-06-03 ENCOUNTER — HOSPITAL ENCOUNTER (EMERGENCY)
Facility: HOSPITAL | Age: 45
Discharge: HOME | End: 2025-06-03
Attending: EMERGENCY MEDICINE
Payer: MEDICAID

## 2025-06-03 VITALS
OXYGEN SATURATION: 100 % | RESPIRATION RATE: 18 BRPM | SYSTOLIC BLOOD PRESSURE: 128 MMHG | HEART RATE: 71 BPM | BODY MASS INDEX: 47.09 KG/M2 | DIASTOLIC BLOOD PRESSURE: 79 MMHG | HEIGHT: 66 IN | TEMPERATURE: 98 F | WEIGHT: 293 LBS

## 2025-06-03 DIAGNOSIS — L76.82 POSTOPERATIVE COMPLICATION OF SKIN INVOLVING DRAINAGE FROM SURGICAL WOUND: Primary | ICD-10-CM

## 2025-06-03 LAB
ALBUMIN SERPL-MCNC: 2.9 G/DL (ref 3.5–5)
ALBUMIN/GLOB SERPL: 0.9 (ref 1.1–2.2)
ALP SERPL-CCNC: 89 U/L (ref 45–117)
ALT SERPL-CCNC: 20 U/L (ref 12–78)
ANION GAP SERPL CALC-SCNC: 6 MMOL/L (ref 2–12)
APPEARANCE UR: CLEAR
AST SERPL-CCNC: 24 U/L (ref 15–37)
BACTERIA URNS QL MICRO: NEGATIVE /HPF
BASOPHILS # BLD: 0.03 K/UL (ref 0–0.1)
BASOPHILS NFR BLD: 0.3 % (ref 0–1)
BILIRUB SERPL-MCNC: 0.6 MG/DL (ref 0.2–1)
BILIRUB UR QL: NEGATIVE
BUN SERPL-MCNC: 10 MG/DL (ref 6–20)
BUN/CREAT SERPL: 13 (ref 12–20)
CALCIUM SERPL-MCNC: 8.8 MG/DL (ref 8.5–10.1)
CHLORIDE SERPL-SCNC: 111 MMOL/L (ref 97–108)
CO2 SERPL-SCNC: 24 MMOL/L (ref 21–32)
COLOR UR: ABNORMAL
CREAT SERPL-MCNC: 0.78 MG/DL (ref 0.55–1.02)
DIFFERENTIAL METHOD BLD: ABNORMAL
EOSINOPHIL # BLD: 0.16 K/UL (ref 0–0.4)
EOSINOPHIL NFR BLD: 1.7 % (ref 0–7)
EPITH CASTS URNS QL MICRO: ABNORMAL /LPF
ERYTHROCYTE [DISTWIDTH] IN BLOOD BY AUTOMATED COUNT: 13.7 % (ref 11.5–14.5)
GLOBULIN SER CALC-MCNC: 3.4 G/DL (ref 2–4)
GLUCOSE SERPL-MCNC: 84 MG/DL (ref 65–100)
GLUCOSE UR STRIP.AUTO-MCNC: NEGATIVE MG/DL
HCT VFR BLD AUTO: 29.6 % (ref 35–47)
HGB BLD-MCNC: 9.2 G/DL (ref 11.5–16)
HGB UR QL STRIP: ABNORMAL
HYALINE CASTS URNS QL MICRO: ABNORMAL /LPF (ref 0–2)
IMM GRANULOCYTES # BLD AUTO: 0.05 K/UL (ref 0–0.04)
IMM GRANULOCYTES NFR BLD AUTO: 0.5 % (ref 0–0.5)
KETONES UR QL STRIP.AUTO: ABNORMAL MG/DL
LEUKOCYTE ESTERASE UR QL STRIP.AUTO: ABNORMAL
LYMPHOCYTES # BLD: 2.65 K/UL (ref 0.8–3.5)
LYMPHOCYTES NFR BLD: 28.2 % (ref 12–49)
MCH RBC QN AUTO: 32.6 PG (ref 26–34)
MCHC RBC AUTO-ENTMCNC: 31.1 G/DL (ref 30–36.5)
MCV RBC AUTO: 105 FL (ref 80–99)
MONOCYTES # BLD: 0.6 K/UL (ref 0–1)
MONOCYTES NFR BLD: 6.4 % (ref 5–13)
NEUTS SEG # BLD: 5.9 K/UL (ref 1.8–8)
NEUTS SEG NFR BLD: 62.9 % (ref 32–75)
NITRITE UR QL STRIP.AUTO: NEGATIVE
NRBC # BLD: 0 K/UL (ref 0–0.01)
NRBC BLD-RTO: 0 PER 100 WBC
PH UR STRIP: 5.5 (ref 5–8)
PLATELET # BLD AUTO: 353 K/UL (ref 150–400)
PMV BLD AUTO: 10 FL (ref 8.9–12.9)
POTASSIUM SERPL-SCNC: 3.3 MMOL/L (ref 3.5–5.1)
PROT SERPL-MCNC: 6.3 G/DL (ref 6.4–8.2)
PROT UR STRIP-MCNC: NEGATIVE MG/DL
RBC # BLD AUTO: 2.82 M/UL (ref 3.8–5.2)
RBC #/AREA URNS HPF: ABNORMAL /HPF (ref 0–5)
SODIUM SERPL-SCNC: 141 MMOL/L (ref 136–145)
SP GR UR REFRACTOMETRY: 1.03
URINE CULTURE IF INDICATED: ABNORMAL
UROBILINOGEN UR QL STRIP.AUTO: 1 EU/DL (ref 0.2–1)
WBC # BLD AUTO: 9.4 K/UL (ref 3.6–11)
WBC URNS QL MICRO: ABNORMAL /HPF (ref 0–4)

## 2025-06-03 PROCEDURE — 2580000003 HC RX 258: Performed by: EMERGENCY MEDICINE

## 2025-06-03 PROCEDURE — 74177 CT ABD & PELVIS W/CONTRAST: CPT

## 2025-06-03 PROCEDURE — 81001 URINALYSIS AUTO W/SCOPE: CPT

## 2025-06-03 PROCEDURE — 6370000000 HC RX 637 (ALT 250 FOR IP): Performed by: EMERGENCY MEDICINE

## 2025-06-03 PROCEDURE — 80053 COMPREHEN METABOLIC PANEL: CPT

## 2025-06-03 PROCEDURE — 85025 COMPLETE CBC W/AUTO DIFF WBC: CPT

## 2025-06-03 PROCEDURE — 6360000004 HC RX CONTRAST MEDICATION: Performed by: RADIOLOGY

## 2025-06-03 PROCEDURE — 36415 COLL VENOUS BLD VENIPUNCTURE: CPT

## 2025-06-03 RX ORDER — 0.9 % SODIUM CHLORIDE 0.9 %
500 INTRAVENOUS SOLUTION INTRAVENOUS ONCE
Status: COMPLETED | OUTPATIENT
Start: 2025-06-03 | End: 2025-06-03

## 2025-06-03 RX ORDER — FENTANYL CITRATE 50 UG/ML
50 INJECTION, SOLUTION INTRAMUSCULAR; INTRAVENOUS
Refills: 0 | Status: DISCONTINUED | OUTPATIENT
Start: 2025-06-03 | End: 2025-06-03

## 2025-06-03 RX ORDER — IOPAMIDOL 755 MG/ML
100 INJECTION, SOLUTION INTRAVASCULAR
Status: COMPLETED | OUTPATIENT
Start: 2025-06-03 | End: 2025-06-03

## 2025-06-03 RX ORDER — OXYCODONE AND ACETAMINOPHEN 5; 325 MG/1; MG/1
1 TABLET ORAL EVERY 6 HOURS PRN
Qty: 12 TABLET | Refills: 0 | Status: SHIPPED | OUTPATIENT
Start: 2025-06-03 | End: 2025-06-06

## 2025-06-03 RX ORDER — OXYCODONE AND ACETAMINOPHEN 5; 325 MG/1; MG/1
2 TABLET ORAL
Refills: 0 | Status: COMPLETED | OUTPATIENT
Start: 2025-06-03 | End: 2025-06-03

## 2025-06-03 RX ORDER — OXYCODONE AND ACETAMINOPHEN 5; 325 MG/1; MG/1
1 TABLET ORAL EVERY 6 HOURS PRN
Qty: 12 TABLET | Refills: 0 | Status: SHIPPED | OUTPATIENT
Start: 2025-06-03 | End: 2025-06-03

## 2025-06-03 RX ADMIN — OXYCODONE AND ACETAMINOPHEN 2 TABLET: 5; 325 TABLET ORAL at 04:15

## 2025-06-03 RX ADMIN — SODIUM CHLORIDE 500 ML: 0.9 INJECTION, SOLUTION INTRAVENOUS at 01:05

## 2025-06-03 RX ADMIN — IOPAMIDOL 100 ML: 755 INJECTION, SOLUTION INTRAVENOUS at 05:07

## 2025-06-03 ASSESSMENT — PAIN SCALES - GENERAL
PAINLEVEL_OUTOF10: 6
PAINLEVEL_OUTOF10: 3
PAINLEVEL_OUTOF10: 9
PAINLEVEL_OUTOF10: 9

## 2025-06-03 ASSESSMENT — PAIN DESCRIPTION - PAIN TYPE
TYPE: SURGICAL PAIN

## 2025-06-03 ASSESSMENT — PAIN - FUNCTIONAL ASSESSMENT
PAIN_FUNCTIONAL_ASSESSMENT: 0-10

## 2025-06-03 ASSESSMENT — PAIN DESCRIPTION - LOCATION
LOCATION: ABDOMEN

## 2025-06-03 ASSESSMENT — ENCOUNTER SYMPTOMS
DIARRHEA: 0
SHORTNESS OF BREATH: 0
ABDOMINAL PAIN: 1
VOMITING: 0
NAUSEA: 0

## 2025-06-03 ASSESSMENT — PAIN DESCRIPTION - DESCRIPTORS
DESCRIPTORS: ACHING
DESCRIPTORS: SHARP;ACHING
DESCRIPTORS: SHARP
DESCRIPTORS: SHARP

## 2025-06-03 ASSESSMENT — PAIN DESCRIPTION - FREQUENCY
FREQUENCY: CONTINUOUS

## 2025-06-03 ASSESSMENT — PAIN DESCRIPTION - ORIENTATION
ORIENTATION: RIGHT

## 2025-06-03 ASSESSMENT — PAIN DESCRIPTION - ONSET
ONSET: PROGRESSIVE
ONSET: ON-GOING

## 2025-06-03 NOTE — ED NOTES
Patient advised that she has an appointment with her psychiatrist this morning around 0930 and is requesting to be left alone for 45-60 minutes if she is still here at that time. Will pass this along to day shift.

## 2025-06-03 NOTE — ED NOTES
Patient is requesting that we remove her IV and that she would talk to the doctor when they come in. She was informed of the potential consequences of not having an IV, especially if she needed IV antibiotics or quick pain relief. She acknowledged understanding and still requested that it be taken out. IV removed accordingly.

## 2025-06-03 NOTE — ED NOTES
Patient discharged from the ED by Dr. Esqueda. Diagnosis, medications, precautions and follow-ups were reviewed with the patient/family. Questions were asked and answered prior to departure. Patient departed the ED via ambulation and was accompanied by herself.

## 2025-06-03 NOTE — DISCHARGE INSTRUCTIONS
It was a pleasure taking care of you in our Emergency Department today.  We know that when you come to [facility], you are entrusting us with your health, comfort, and safety.  Our physicians and nurses honor that trust, and truly appreciate the opportunity to care for you and your loved ones.      We also value your feedback.  If you receive a survey about your Emergency Department experience today, please fill it out.  We care about our patients' feedback, and we listen to what you have to say.  Thank you!      Dr. Michell Esqueda MD.

## 2025-06-03 NOTE — ED NOTES
Patient advised that the last time she was here, the ultrasound lines kept blowing with infusions running so patient demanded that we stop the fluids. She said if she was going to get fluids, then she needed a midline. Patient was informed that we only do midlines for admitted patients and if she got admitted that we would call in the midline team. Until then, we decided to keep the current IV in place until the doctor sees her and a disposition is made.

## 2025-06-03 NOTE — ED PROVIDER NOTES
EMERGENCY DEPARTMENT HISTORY AND PHYSICAL EXAM     ----------------------------------------------------------------------------  Please note that this dictation was completed with [employer], the [employer] voice recognition software.  Quite often unanticipated grammatical, syntax, homophones, and other interpretive errors are inadvertently transcribed by the computer software.  Please disregard these errors.  Please excuse any errors that have escaped final proofreading  ----------------------------------------------------------------------------      Date: 6/3/2025  Patient Name: Ruma Boyd      HISTORY OF PRESENT ILLNESS     Chief Complaint   Patient presents with    Liver Catheter Problem     Pt brought in by EMS for a liver catheter change (pt has drain for a liver hematoma). Per EMS, [facility] didn't have the right equipment which is why she came here. Pt report 7/10 right abd pain around insertion site. Drain is currently draining dark red blood.        History obtainted from:  Patient    Other independent source of history: EMS    HPI: Ruma Boyd is a 45 y.o. female, with significant pmhx of gastric sleeve, prior in hospital cardiac arrest, who presents via EMS to the ED with c/o requiring dressing change of her previously placed hematoma drain.  Notes that there is a foul odor coming from dark red drainage around this.  Denies fever, nausea or vomiting.  Notes having increased pain and having recently run out of her pain medication prescribed at discharge.      PCP: Heaven Ballard MD    Allergy List:   Allergies   Allergen Reactions    Naproxen     Tramadol          CURRENT MEDICATIONS      Discharge Medication List as of 6/3/2025  6:42 AM        CONTINUE these medications which have NOT CHANGED    Details   amLODIPine (NORVASC) 5 MG tablet Take 0.5 tablets by mouth daily, Disp-15 tablet, R-1Normal      Multiple Vitamins-Minerals (BARIATRIC FUSION) CHEW Take 1 tablet by mouth daily (with breakfast),

## 2025-06-03 NOTE — ED NOTES
Dr. Cleary was the surgeon who placed the liver drain. She stated that she was admitted to the hospital for this liver hematoma, got the drain placed, and then was discharged home. She advises that the site is very painful, she needs a new drain and bandage, and that she went to [facility] for the pain and discomfort. They scanned her and advised that the hematoma size went from 9.9 cm at discharge up to 10.7 cm there. She feels like something is wrong. [facility] advised that they didn't have the liver drain or bandage she needed and recommended that she come here to see the surgeon/team that originally placed the drain.

## 2025-06-05 ENCOUNTER — HOSPITAL ENCOUNTER (OUTPATIENT)
Facility: HOSPITAL | Age: 45
Discharge: HOME | End: 2025-06-08
Payer: MEDICAID

## 2025-06-05 DIAGNOSIS — Z09 FOLLOW-UP EXAM: ICD-10-CM

## 2025-06-05 PROCEDURE — 99212 OFFICE O/P EST SF 10 MIN: CPT

## 2025-06-05 RX ORDER — CEPHALEXIN 500 MG/1
500 CAPSULE ORAL 3 TIMES DAILY
Qty: 21 CAPSULE | Refills: 0 | Status: SHIPPED | OUTPATIENT
Start: 2025-06-05 | End: 2025-06-12

## 2025-06-05 NOTE — DISCHARGE INSTRUCTIONS
[facility]  [facility]  Special Procedures/Radiology Department      RADIOLOGIST:   Sari Cleary MD        DATE:   June 5, 2025      Drainage Discharge Instructions      Keep the dressing clean and dry.  Do not remove the dressing for 72 hours.  Change dressing every 3 days, remove the dressing, and wash the area as you normally would.  Flush drain once a day.    Watch for signs of infection:   Redness   Fever/Chills   Increased pain or tenderness at the site   Drainage  If this occurs, call your physician: __Primary Care Physican    Rest today    Be sure to follow up with your physician after 3 days    Resume previous diet and follow medication reconciliation form.       You may have some discomfort at the insertion site, you can take Tylenol, avoid aspirin products, as they promote bleeding.    Any questions, please call [phone] and ask to speak to the nurse on call.    Other:

## 2025-06-05 NOTE — PROGRESS NOTES
INTERVENTIONAL RADIOLOGY PROGRESS NOTE  Ms. Boyd is a 45 y.o. woman with IIH (s/p VPS) admitted to the ED with nausea, vomiting, and bradycardia. On 5/10 she had sustained VT/torsades for which she briefly underwent CPR. Imaging has subsequently shown a large subcapsular hepatic hematoma, which was drained by IR on 5/13/2025 due to elevated LFTs and concern that the hematoma was compressing the adjacent normal liver. She presents for drain evaluation. She states that she has had foul-smelling discharge around the drain. She also notes that no one has showed her how to take care of the drain, and she does not have home health nursing set up. The patient has had serial CT scans since drain placement, the most recent on 6/3/2025 which demonstrates significant improvement in the size of the collection compared to prior to placement. She continues to get 20-30 cc of old appearing hematoma out of the drain each day.    On exam, the right upper quadrant abdominal drain remains unchanged in position, sutured in place. There is a small amount of white discharge from around the drain. In the drainage bag is liquefied hematoma.    - Will give short course of antibiotics to cover cellulitis around the drain.  - Provided the patient with supplies to perform wound care as well as flushes to flush the drain daily.  - Follow-up in 2 weeks for drain evaluation and possible removal.

## 2025-06-16 ENCOUNTER — HOSPITAL ENCOUNTER (OUTPATIENT)
Facility: HOSPITAL | Age: 45
Discharge: HOME | End: 2025-06-19
Payer: MEDICAID

## 2025-06-16 DIAGNOSIS — Z09 FOLLOW-UP EXAM: ICD-10-CM

## 2025-06-16 PROCEDURE — 6360000004 HC RX CONTRAST MEDICATION: Performed by: STUDENT IN AN ORGANIZED HEALTH CARE EDUCATION/TRAINING PROGRAM

## 2025-06-16 PROCEDURE — 76080 X-RAY EXAM OF FISTULA: CPT

## 2025-06-16 RX ORDER — IOPAMIDOL 755 MG/ML
100 INJECTION, SOLUTION INTRAVASCULAR ONCE
Status: COMPLETED | OUTPATIENT
Start: 2025-06-16 | End: 2025-06-16

## 2025-06-16 RX ADMIN — IOPAMIDOL 10 ML: 755 INJECTION, SOLUTION INTRAVENOUS at 14:50

## 2025-06-16 NOTE — PROGRESS NOTES
1300: Patient arrived ambulatory to [facility]. Patient is A&Ox4 on RA in NAD at this time.  Allergies reviewed with patient prior to procedure.     1345: Patient to follow-up in three weeks for drain check.  Patient walked to [facility] for transportation home.

## 2025-06-16 NOTE — PROCEDURES
Brief Postoperative Note    Ruma Boyd  YOB: 1980  [id number]    Pre-operative Diagnosis: Subcapsular hepatic hematoma     Post-operative Diagnosis: Same    Procedure: Drain check    Anesthesia: None    Surgeons/Assistants: Sari Cleary MD    Estimated Blood Loss: Minimal     Complications: None    Specimens: Was Not Obtained    Findings: Contrast injection through the existing right upper quadrant subcapsular hepatic hematoma drain demonstrated a persistent fluid collection around the drainage catheter. The patient also reported drainage of up to 100 mL of full blood per day. The drain was left in place    Electronically signed by Sari Cleary MD on 6/16/2025 at 3:21 PM

## 2025-06-19 ENCOUNTER — APPOINTMENT (OUTPATIENT)
Facility: HOSPITAL | Age: 45
End: 2025-06-19
Payer: MEDICAID

## 2025-06-19 ENCOUNTER — HOSPITAL ENCOUNTER (EMERGENCY)
Facility: HOSPITAL | Age: 45
Discharge: HOME | End: 2025-06-20
Attending: EMERGENCY MEDICINE
Payer: MEDICAID

## 2025-06-19 DIAGNOSIS — R10.10 PAIN OF UPPER ABDOMEN: Primary | ICD-10-CM

## 2025-06-19 DIAGNOSIS — R11.2 NAUSEA AND VOMITING, UNSPECIFIED VOMITING TYPE: ICD-10-CM

## 2025-06-19 DIAGNOSIS — G43.909 MIGRAINE WITHOUT STATUS MIGRAINOSUS, NOT INTRACTABLE, UNSPECIFIED MIGRAINE TYPE: ICD-10-CM

## 2025-06-19 LAB
ALBUMIN SERPL-MCNC: 2.4 G/DL (ref 3.5–5)
ALBUMIN/GLOB SERPL: 0.5 (ref 1.1–2.2)
ALP SERPL-CCNC: 96 U/L (ref 45–117)
ALT SERPL-CCNC: 17 U/L (ref 12–78)
ANION GAP SERPL CALC-SCNC: 6 MMOL/L (ref 2–12)
AST SERPL-CCNC: 23 U/L (ref 15–37)
BASOPHILS # BLD: 0.04 K/UL (ref 0–0.1)
BASOPHILS NFR BLD: 0.4 % (ref 0–1)
BILIRUB SERPL-MCNC: 0.3 MG/DL (ref 0.2–1)
BUN SERPL-MCNC: 6 MG/DL (ref 6–20)
BUN/CREAT SERPL: 7 (ref 12–20)
CALCIUM SERPL-MCNC: 9.4 MG/DL (ref 8.5–10.1)
CHLORIDE SERPL-SCNC: 109 MMOL/L (ref 97–108)
CO2 SERPL-SCNC: 25 MMOL/L (ref 21–32)
CREAT SERPL-MCNC: 0.9 MG/DL (ref 0.55–1.02)
DIFFERENTIAL METHOD BLD: ABNORMAL
EOSINOPHIL # BLD: 0.17 K/UL (ref 0–0.4)
EOSINOPHIL NFR BLD: 1.7 % (ref 0–7)
ERYTHROCYTE [DISTWIDTH] IN BLOOD BY AUTOMATED COUNT: 14.2 % (ref 11.5–14.5)
GLOBULIN SER CALC-MCNC: 4.4 G/DL (ref 2–4)
GLUCOSE SERPL-MCNC: 74 MG/DL (ref 65–100)
HCT VFR BLD AUTO: 27.9 % (ref 35–47)
HGB BLD-MCNC: 8.3 G/DL (ref 11.5–16)
IMM GRANULOCYTES # BLD AUTO: 0.06 K/UL (ref 0–0.04)
IMM GRANULOCYTES NFR BLD AUTO: 0.6 % (ref 0–0.5)
LIPASE SERPL-CCNC: 30 U/L (ref 13–75)
LYMPHOCYTES # BLD: 2.43 K/UL (ref 0.8–3.5)
LYMPHOCYTES NFR BLD: 24 % (ref 12–49)
MCH RBC QN AUTO: 31 PG (ref 26–34)
MCHC RBC AUTO-ENTMCNC: 29.7 G/DL (ref 30–36.5)
MCV RBC AUTO: 104.1 FL (ref 80–99)
MONOCYTES # BLD: 0.69 K/UL (ref 0–1)
MONOCYTES NFR BLD: 6.8 % (ref 5–13)
NEUTS SEG # BLD: 6.72 K/UL (ref 1.8–8)
NEUTS SEG NFR BLD: 66.5 % (ref 32–75)
NRBC # BLD: 0 K/UL (ref 0–0.01)
NRBC BLD-RTO: 0 PER 100 WBC
PLATELET # BLD AUTO: 536 K/UL (ref 150–400)
PMV BLD AUTO: 9.1 FL (ref 8.9–12.9)
POTASSIUM SERPL-SCNC: 3.8 MMOL/L (ref 3.5–5.1)
PROT SERPL-MCNC: 6.8 G/DL (ref 6.4–8.2)
RBC # BLD AUTO: 2.68 M/UL (ref 3.8–5.2)
SODIUM SERPL-SCNC: 140 MMOL/L (ref 136–145)
TROPONIN I SERPL HS-MCNC: 4 NG/L (ref 0–51)
WBC # BLD AUTO: 10.1 K/UL (ref 3.6–11)

## 2025-06-19 PROCEDURE — 96375 TX/PRO/DX INJ NEW DRUG ADDON: CPT

## 2025-06-19 PROCEDURE — 6360000002 HC RX W HCPCS: Performed by: STUDENT IN AN ORGANIZED HEALTH CARE EDUCATION/TRAINING PROGRAM

## 2025-06-19 PROCEDURE — 99285 EMERGENCY DEPT VISIT HI MDM: CPT

## 2025-06-19 PROCEDURE — 87086 URINE CULTURE/COLONY COUNT: CPT

## 2025-06-19 PROCEDURE — 80053 COMPREHEN METABOLIC PANEL: CPT

## 2025-06-19 PROCEDURE — 85025 COMPLETE CBC W/AUTO DIFF WBC: CPT

## 2025-06-19 PROCEDURE — 74177 CT ABD & PELVIS W/CONTRAST: CPT

## 2025-06-19 PROCEDURE — 6360000004 HC RX CONTRAST MEDICATION: Performed by: EMERGENCY MEDICINE

## 2025-06-19 PROCEDURE — 84484 ASSAY OF TROPONIN QUANT: CPT

## 2025-06-19 PROCEDURE — 96374 THER/PROPH/DIAG INJ IV PUSH: CPT

## 2025-06-19 PROCEDURE — 6360000002 HC RX W HCPCS: Performed by: EMERGENCY MEDICINE

## 2025-06-19 PROCEDURE — 81001 URINALYSIS AUTO W/SCOPE: CPT

## 2025-06-19 PROCEDURE — 96376 TX/PRO/DX INJ SAME DRUG ADON: CPT

## 2025-06-19 PROCEDURE — 36415 COLL VENOUS BLD VENIPUNCTURE: CPT

## 2025-06-19 PROCEDURE — 83690 ASSAY OF LIPASE: CPT

## 2025-06-19 RX ORDER — ONDANSETRON 2 MG/ML
4 INJECTION INTRAMUSCULAR; INTRAVENOUS ONCE
Status: COMPLETED | OUTPATIENT
Start: 2025-06-19 | End: 2025-06-19

## 2025-06-19 RX ORDER — METOCLOPRAMIDE HYDROCHLORIDE 5 MG/ML
10 INJECTION INTRAMUSCULAR; INTRAVENOUS ONCE
Status: COMPLETED | OUTPATIENT
Start: 2025-06-19 | End: 2025-06-19

## 2025-06-19 RX ORDER — KETOROLAC TROMETHAMINE 30 MG/ML
15 INJECTION, SOLUTION INTRAMUSCULAR; INTRAVENOUS ONCE
Status: COMPLETED | OUTPATIENT
Start: 2025-06-19 | End: 2025-06-19

## 2025-06-19 RX ORDER — DICYCLOMINE HCL 20 MG
20 TABLET ORAL 4 TIMES DAILY PRN
Qty: 20 TABLET | Refills: 0 | Status: SHIPPED | OUTPATIENT
Start: 2025-06-19

## 2025-06-19 RX ORDER — HYDROCODONE BITARTRATE AND ACETAMINOPHEN 5; 325 MG/1; MG/1
1 TABLET ORAL EVERY 4 HOURS PRN
Qty: 18 TABLET | Refills: 0 | Status: SHIPPED | OUTPATIENT
Start: 2025-06-19 | End: 2025-06-22

## 2025-06-19 RX ORDER — MORPHINE SULFATE 4 MG/ML
4 INJECTION, SOLUTION INTRAMUSCULAR; INTRAVENOUS
Status: COMPLETED | OUTPATIENT
Start: 2025-06-19 | End: 2025-06-19

## 2025-06-19 RX ORDER — IOPAMIDOL 755 MG/ML
100 INJECTION, SOLUTION INTRAVASCULAR
Status: COMPLETED | OUTPATIENT
Start: 2025-06-19 | End: 2025-06-19

## 2025-06-19 RX ORDER — ONDANSETRON 4 MG/1
4 TABLET, ORALLY DISINTEGRATING ORAL 3 TIMES DAILY PRN
Qty: 21 TABLET | Refills: 0 | Status: SHIPPED | OUTPATIENT
Start: 2025-06-19

## 2025-06-19 RX ADMIN — MORPHINE SULFATE 4 MG: 4 INJECTION INTRAVENOUS at 19:20

## 2025-06-19 RX ADMIN — ONDANSETRON 4 MG: 2 INJECTION, SOLUTION INTRAMUSCULAR; INTRAVENOUS at 19:17

## 2025-06-19 RX ADMIN — IOPAMIDOL 100 ML: 755 INJECTION, SOLUTION INTRAVENOUS at 20:38

## 2025-06-19 RX ADMIN — METOCLOPRAMIDE 10 MG: 5 INJECTION, SOLUTION INTRAMUSCULAR; INTRAVENOUS at 22:58

## 2025-06-19 RX ADMIN — KETOROLAC TROMETHAMINE 15 MG: 30 INJECTION, SOLUTION INTRAMUSCULAR at 22:57

## 2025-06-19 RX ADMIN — ONDANSETRON 4 MG: 2 INJECTION, SOLUTION INTRAMUSCULAR; INTRAVENOUS at 21:29

## 2025-06-19 ASSESSMENT — PAIN DESCRIPTION - LOCATION
LOCATION: ABDOMEN;HEAD
LOCATION: ABDOMEN
LOCATION: HEAD;ABDOMEN
LOCATION: ABDOMEN;HEAD

## 2025-06-19 ASSESSMENT — PAIN SCALES - GENERAL
PAINLEVEL_OUTOF10: 5
PAINLEVEL_OUTOF10: 6
PAINLEVEL_OUTOF10: 7
PAINLEVEL_OUTOF10: 6

## 2025-06-19 ASSESSMENT — PAIN - FUNCTIONAL ASSESSMENT
PAIN_FUNCTIONAL_ASSESSMENT: 0-10
PAIN_FUNCTIONAL_ASSESSMENT: 0-10

## 2025-06-19 ASSESSMENT — PAIN DESCRIPTION - ORIENTATION
ORIENTATION: MID

## 2025-06-19 ASSESSMENT — PAIN DESCRIPTION - DESCRIPTORS
DESCRIPTORS: ACHING

## 2025-06-19 NOTE — ED NOTES
Assumed care of patient at this time, patient found to be resting in bed, patient found to be alert and oriented Xs 4, patient reports pain level of a 7/10, allowed time for questions, discussed current plan of care, bed in lowest position, call bell in place, no further needs at this time.

## 2025-06-19 NOTE — ED NOTES
This nurse medicated patient and patient is alert and oriented x 4 and at this time is in a position of comfort. Pt was continuously belching while this nurse was medicating patient.

## 2025-06-19 NOTE — ED PROVIDER NOTES
problems.  She is requesting something to eat and drink.  Will also p.o. challenge her.  Pending results of her urine we will discharge home [DT]      ED Course User Index  [DT] Luis Mon MD             FINAL IMPRESSION     1. Pain of upper abdomen    2. Nausea and vomiting, unspecified vomiting type    3. Migraine without status migrainosus, not intractable, unspecified migraine type          DISPOSITION/PLAN   Ruma Boyd's  results have been reviewed with her.  She has been counseled regarding her diagnosis, treatment, and plan.  She verbally conveys understanding and agreement of the signs, symptoms, diagnosis, treatment and prognosis and additionally agrees to follow up as discussed.  She also agrees with the care-plan and conveys that all of her questions have been answered.  I have also provided discharge instructions for her that include: educational information regarding their diagnosis and treatment, and list of reasons why they would want to return to the ED prior to their follow-up appointment, should her condition change.     CLINICAL IMPRESSION    Discharge Note: The patient is stable for discharge home. The signs, symptoms, diagnosis, and discharge instructions have been discussed, understanding conveyed, and agreed upon. The patient is to follow up as recommended or return to ER should their symptoms worsen.      PATIENT REFERRED TO:  Heaven Ballard MD  [address] [address] [phone]      As needed    [facility] Emergency Department  [address] [facility] [address]  [phone]    If symptoms worsen       DISCHARGE MEDICATIONS:     Medication List        START taking these medications      dicyclomine 20 MG tablet  Commonly known as: BENTYL  Take 1 tablet by mouth 4 times daily as needed (Cramping)     HYDROcodone-acetaminophen 5-325 MG per tablet  Commonly known as: NORCO  Take 1 tablet by mouth every 4 hours as needed for Pain for

## 2025-06-20 VITALS
RESPIRATION RATE: 17 BRPM | BODY MASS INDEX: 32.57 KG/M2 | HEART RATE: 81 BPM | SYSTOLIC BLOOD PRESSURE: 184 MMHG | WEIGHT: 201.8 LBS | TEMPERATURE: 98 F | OXYGEN SATURATION: 96 % | DIASTOLIC BLOOD PRESSURE: 94 MMHG

## 2025-06-20 LAB
APPEARANCE UR: ABNORMAL
BACTERIA URNS QL MICRO: ABNORMAL /HPF
BILIRUB UR QL: NEGATIVE
COLOR UR: ABNORMAL
EPITH CASTS URNS QL MICRO: ABNORMAL /LPF
GLUCOSE UR STRIP.AUTO-MCNC: NEGATIVE MG/DL
HGB UR QL STRIP: ABNORMAL
HYALINE CASTS URNS QL MICRO: ABNORMAL /LPF (ref 0–5)
KETONES UR QL STRIP.AUTO: NEGATIVE MG/DL
LEUKOCYTE ESTERASE UR QL STRIP.AUTO: ABNORMAL
MUCOUS THREADS URNS QL MICRO: ABNORMAL /LPF
NITRITE UR QL STRIP.AUTO: NEGATIVE
PH UR STRIP: 6 (ref 5–8)
PROT UR STRIP-MCNC: NEGATIVE MG/DL
RBC #/AREA URNS HPF: ABNORMAL /HPF (ref 0–5)
SP GR UR REFRACTOMETRY: 1.01 (ref 1–1.03)
URINE CULTURE IF INDICATED: ABNORMAL
UROBILINOGEN UR QL STRIP.AUTO: 0.2 EU/DL (ref 0.2–1)
WBC URNS QL MICRO: ABNORMAL /HPF (ref 0–4)

## 2025-06-20 NOTE — ED NOTES
This nurse provided patient with tissues to use the bedside commode as well as reinforced patient's iv with adhesive tape

## 2025-06-21 LAB
BACTERIA SPEC CULT: NORMAL
SERVICE CMNT-IMP: NORMAL

## 2025-07-10 ENCOUNTER — HOSPITAL ENCOUNTER (OUTPATIENT)
Facility: HOSPITAL | Age: 45
Discharge: HOME | End: 2025-07-13
Payer: MEDICAID

## 2025-07-10 VITALS
HEART RATE: 88 BPM | SYSTOLIC BLOOD PRESSURE: 134 MMHG | TEMPERATURE: 98.4 F | RESPIRATION RATE: 20 BRPM | OXYGEN SATURATION: 99 % | WEIGHT: 201 LBS | HEIGHT: 66 IN | BODY MASS INDEX: 32.3 KG/M2 | DIASTOLIC BLOOD PRESSURE: 77 MMHG

## 2025-07-10 DIAGNOSIS — Z09 FOLLOW-UP EXAM: ICD-10-CM

## 2025-07-10 PROCEDURE — 99212 OFFICE O/P EST SF 10 MIN: CPT

## 2025-07-10 PROCEDURE — 6360000004 HC RX CONTRAST MEDICATION: Performed by: STUDENT IN AN ORGANIZED HEALTH CARE EDUCATION/TRAINING PROGRAM

## 2025-07-10 RX ORDER — IOPAMIDOL 755 MG/ML
INJECTION, SOLUTION INTRAVASCULAR PRN
Status: COMPLETED | OUTPATIENT
Start: 2025-07-10 | End: 2025-07-10

## 2025-07-10 RX ADMIN — IOPAMIDOL 7 ML: 755 INJECTION, SOLUTION INTRAVENOUS at 13:51

## 2025-07-10 ASSESSMENT — PAIN - FUNCTIONAL ASSESSMENT: PAIN_FUNCTIONAL_ASSESSMENT: NONE - DENIES PAIN

## 2025-07-10 NOTE — DISCHARGE INSTRUCTIONS
[facility]  [facility]  Special Procedures/Radiology Department      RADIOLOGIST:  [facility]- Sari Cleary MD       DATE:   07/10/2025      Drainage Discharge Instructions      Keep the dressing clean and dry.  Do not remove the dressing for 72 hours.  After 3 days, remove the dressing, and wash the area as you normally would.      Watch for signs of infection:   Redness   Fever/Chills   Increased pain or tenderness at the site   Drainage  If this occurs, call your physician:     Rest today    Resume previous diet and follow medication reconciliation form.     You may have some discomfort at the insertion site, you can take Tylenol, avoid aspirin products, as they promote bleeding.    Any questions, please call [phone] and ask to speak to the nurse on call.

## 2025-07-10 NOTE — PROGRESS NOTES
1330: Patient arrived ambulatory to [facility] Recovery Area. Patient is A&OX4 on RA in NAD at this time.     1400: Patient on Angio table for drain evaluation. Per MD Cleary, drain to be removed.     1420: Patient A&Ox4 on RA in NAD at time of discharge. Discharge instruction reviewed with patient. Opportunity for questions and clarification given. Patient verbalized understanding. Patient escorted to radiology lobby for discharge home.

## 2025-07-10 NOTE — PROCEDURES
Brief Postoperative Note    Ruma Boyd  YOB: 1980  [id number]    Pre-operative Diagnosis: Subcapsular hepatic hematoma     Post-operative Diagnosis: Same    Procedure: Drain check and removal     Anesthesia: None    Surgeons/Assistants: Sari Cleary MD    Estimated Blood Loss: Minimal     Complications: None    Specimens: Was Not Obtained    Findings: Contrast injection through the existing right upper quadrant subcapsular hepatic hematoma drain demonstrated no significant residual fluid collection. The patient also reported decreased drainage to approximately 5 mL of old blood per day. The drain was removed.     Electronically signed by Sari Cleary MD on 7/10/2025 at 2:15 PM

## 2025-07-12 ENCOUNTER — APPOINTMENT (OUTPATIENT)
Facility: HOSPITAL | Age: 45
End: 2025-07-12
Payer: MEDICAID

## 2025-07-12 ENCOUNTER — HOSPITAL ENCOUNTER (EMERGENCY)
Facility: HOSPITAL | Age: 45
Discharge: HOME | End: 2025-07-12
Attending: STUDENT IN AN ORGANIZED HEALTH CARE EDUCATION/TRAINING PROGRAM
Payer: MEDICAID

## 2025-07-12 VITALS
DIASTOLIC BLOOD PRESSURE: 53 MMHG | TEMPERATURE: 98 F | SYSTOLIC BLOOD PRESSURE: 105 MMHG | RESPIRATION RATE: 18 BRPM | OXYGEN SATURATION: 100 % | HEART RATE: 70 BPM

## 2025-07-12 DIAGNOSIS — T82.529A: Primary | ICD-10-CM

## 2025-07-12 LAB
EKG ATRIAL RATE: 70 BPM
EKG DIAGNOSIS: NORMAL
EKG P AXIS: 93 DEGREES
EKG P-R INTERVAL: 186 MS
EKG Q-T INTERVAL: 452 MS
EKG QRS DURATION: 68 MS
EKG QTC CALCULATION (BAZETT): 488 MS
EKG R AXIS: 35 DEGREES
EKG T AXIS: 27 DEGREES
EKG VENTRICULAR RATE: 70 BPM

## 2025-07-12 PROCEDURE — 6370000000 HC RX 637 (ALT 250 FOR IP): Performed by: STUDENT IN AN ORGANIZED HEALTH CARE EDUCATION/TRAINING PROGRAM

## 2025-07-12 PROCEDURE — 93005 ELECTROCARDIOGRAM TRACING: CPT | Performed by: STUDENT IN AN ORGANIZED HEALTH CARE EDUCATION/TRAINING PROGRAM

## 2025-07-12 PROCEDURE — 71046 X-RAY EXAM CHEST 2 VIEWS: CPT

## 2025-07-12 PROCEDURE — 99284 EMERGENCY DEPT VISIT MOD MDM: CPT

## 2025-07-12 RX ORDER — LIDOCAINE 4 G/G
1 PATCH TOPICAL
Status: DISCONTINUED | OUTPATIENT
Start: 2025-07-12 | End: 2025-07-12 | Stop reason: HOSPADM

## 2025-07-12 RX ORDER — OXYCODONE HYDROCHLORIDE 5 MG/1
2.5 TABLET ORAL ONCE
Refills: 0 | Status: COMPLETED | OUTPATIENT
Start: 2025-07-12 | End: 2025-07-12

## 2025-07-12 RX ORDER — ACETAMINOPHEN 500 MG
1000 TABLET ORAL
Status: COMPLETED | OUTPATIENT
Start: 2025-07-12 | End: 2025-07-12

## 2025-07-12 RX ADMIN — OXYCODONE 2.5 MG: 5 TABLET ORAL at 06:28

## 2025-07-12 RX ADMIN — ACETAMINOPHEN 1000 MG: 500 TABLET ORAL at 06:28

## 2025-07-12 ASSESSMENT — PAIN DESCRIPTION - LOCATION
LOCATION: CHEST
LOCATION: CHEST

## 2025-07-12 ASSESSMENT — PAIN SCALES - GENERAL
PAINLEVEL_OUTOF10: 5
PAINLEVEL_OUTOF10: 5

## 2025-07-12 ASSESSMENT — PAIN DESCRIPTION - ORIENTATION
ORIENTATION: LEFT
ORIENTATION: LEFT;UPPER

## 2025-07-12 ASSESSMENT — PAIN DESCRIPTION - DESCRIPTORS
DESCRIPTORS: ACHING;TINGLING;BURNING
DESCRIPTORS: ACHING;BURNING;TINGLING

## 2025-07-12 NOTE — ED NOTES
Report given to ANIA benson. Nurse was informed of reason for arrival, vitals, labs, medications, orders, procedures, results, anything left pending and current plan of action. Questions were asked and received prior to departure from the patient.

## 2025-07-12 NOTE — ED PROVIDER NOTES
[facility] EMERGENCY DEPARTMENT  EMERGENCY DEPARTMENT ENCOUNTER       Pt Name: Ruma Boyd  MRN: [id number]  Birthdate 1980  Date of evaluation: 7/12/2025  Provider: Roly Gaviria MD   PCP: Heaven Ballard MD  Note Started: 6:14 AM EDT 7/12/25     CHIEF COMPLAINT       Chief Complaint   Patient presents with    ICD movement      BIBEMS. Cc IDC movement; pt states woke up to use the bathroom and noticed that he IDC was flipped vertically, it flipped back over and moved out of it original position; denies cp; ICD placed here [facility] May 22nd      HISTORY OF PRESENT ILLNESS: 1 or more elements      History From: patient, History limited by: none     Ruma Boyd is a 45 y.o. female W=with past medical history of VT/torsade cardiac arrest s/p ICD placement by Dr. Pritchard with [facility] in May 2025 presents to the emergency department with discomfort of her left chest at her ICD site.  She reports that she got up this morning and when she moved her arm her pacemaker flipped up and then over.  It is now sitting lower at her left axilla compared to the how it has been.  It is causing her discomfort.  She has been eating and drinking but also has an NGT that was placed at [facility] recently due to pyloric valve constriction with PO intolerance.    Please See MDM for Additional Details of the HPI/PMH  Nursing Notes were all reviewed and agreed with or any disagreements were addressed in the HPI.     REVIEW OF SYSTEMS      Positives and Pertinent negatives as per HPI.    PAST HISTORY     Past Medical History:  No past medical history on file.    Past Surgical History:  Past Surgical History:   Procedure Laterality Date    EP DEVICE PROCEDURE N/A 5/22/2025    Insert ICD dual performed by Hiram Pritchard MD at [facility] CARDIAC CATH LAB    US ASP ABSCESS/HEMATOMA/BULLA/CYST  5/13/2025    US ASP ABSCESS/HEMATOMA/BULLA/CYST 5/13/2025 [facility] RAD US       Family History:  No family history on file.    Social

## 2025-07-12 NOTE — DISCHARGE INSTRUCTIONS
Please make a followup with your primary care physician and cardiologist.  If you have any new or worsening concerning medical symptoms please return to the emergency department.

## (undated) DEVICE — TRAY,IRRIGATION,PISTON SYRINGE,60ML,STRL: Brand: MEDLINE

## (undated) DEVICE — INTRODUCER SHTH L13CM OD7FR SH ORNG HUB SEAMLESS SAFSHTH

## (undated) DEVICE — 3M™ IOBAN™ 2 ANTIMICROBIAL INCISE DRAPE 6650EZ: Brand: IOBAN™ 2

## (undated) DEVICE — LIQUIBAND RAPID ADHESIVE 36/CS 0.8ML: Brand: MEDLINE

## (undated) DEVICE — MEDI-TRACE CADENCE ADULT, DEFIBRILLATION ELECTRODE -RTS  (10 PR/PK) - PHYSIO-CONTROL: Brand: MEDI-TRACE CADENCE

## (undated) DEVICE — SUTURE PERMAHAND SZ 0 L30IN NONABSORBABLE BLK L26MM SH 1/2 K834H

## (undated) DEVICE — SUTURE VICRYL COAT SZ 4-0 L18IN ABSRB UD L19MM PS-2 1/2 CIR J496G

## (undated) DEVICE — GUIDEWIRE VASC L40CM DIA0018IN NDL 21GA L7CM Z S STL MAK

## (undated) DEVICE — SUTURE VICRYL 2-0 L27IN ABSRB UD PS-2 L19MM 1/2 CIR J428H

## (undated) DEVICE — SPONGE GZ W4XL4IN COT RADPQ HIGHLY ABSRB STERILE

## (undated) DEVICE — ELECTRODE PT RET AD L9FT HI MOIST COND ADH HYDRGEL CORDED

## (undated) DEVICE — KIT ACCS INTRO 4FR L10CM NDL 21GA L7CM GWIRE L40CM

## (undated) DEVICE — KIT INTRO 9FR L13CM DIA0.118IN SPLITTABLE HEMSTAT ROBUST